# Patient Record
Sex: MALE | Race: WHITE | NOT HISPANIC OR LATINO | Employment: OTHER | URBAN - NONMETROPOLITAN AREA
[De-identification: names, ages, dates, MRNs, and addresses within clinical notes are randomized per-mention and may not be internally consistent; named-entity substitution may affect disease eponyms.]

---

## 2023-05-30 PROBLEM — I25.5 ISCHEMIC DILATED CARDIOMYOPATHY (MULTI): Status: ACTIVE | Noted: 2023-05-30

## 2023-05-30 PROBLEM — R73.09 ABNORMAL BLOOD SUGAR: Status: ACTIVE | Noted: 2023-05-30

## 2023-05-30 PROBLEM — I77.9: Status: ACTIVE | Noted: 2023-05-30

## 2023-05-30 PROBLEM — G47.30 SLEEP APNEA: Status: ACTIVE | Noted: 2023-05-30

## 2023-05-30 PROBLEM — E78.00 PURE HYPERCHOLESTEROLEMIA: Status: ACTIVE | Noted: 2023-05-30

## 2023-05-30 PROBLEM — R51.9 HEADACHE: Status: ACTIVE | Noted: 2023-05-30

## 2023-05-30 PROBLEM — R53.83 FATIGUE: Status: ACTIVE | Noted: 2023-05-30

## 2023-05-30 PROBLEM — I34.0 MODERATE MITRAL REGURGITATION: Status: ACTIVE | Noted: 2023-05-30

## 2023-05-30 PROBLEM — I25.10 CAD (CORONARY ARTERY DISEASE): Status: ACTIVE | Noted: 2023-05-30

## 2023-05-30 PROBLEM — Z98.890 H/O ABDOMINAL AORTIC ANEURYSM REPAIR: Status: ACTIVE | Noted: 2023-05-30

## 2023-05-30 PROBLEM — J34.2 NASAL SEPTAL DEVIATION: Status: ACTIVE | Noted: 2023-05-30

## 2023-05-30 PROBLEM — I50.9: Status: ACTIVE | Noted: 2023-05-30

## 2023-05-30 PROBLEM — I71.40 AAA (ABDOMINAL AORTIC ANEURYSM) (CMS-HCC): Status: ACTIVE | Noted: 2023-05-30

## 2023-05-30 PROBLEM — I10 HYPERTENSION, ESSENTIAL: Status: ACTIVE | Noted: 2023-05-30

## 2023-05-30 PROBLEM — G93.89 CEREBRAL VENTRICULOMEGALY: Status: ACTIVE | Noted: 2023-05-30

## 2023-05-30 PROBLEM — H71.92 CHOLESTEATOMA OF LEFT EAR: Status: ACTIVE | Noted: 2023-05-30

## 2023-05-30 PROBLEM — I42.0 ISCHEMIC DILATED CARDIOMYOPATHY (MULTI): Status: ACTIVE | Noted: 2023-05-30

## 2023-05-30 PROBLEM — R09.89 ABNORMAL PULSE: Status: ACTIVE | Noted: 2023-05-30

## 2023-05-30 PROBLEM — R26.9 GAIT DISTURBANCE: Status: ACTIVE | Noted: 2023-05-30

## 2023-05-30 PROBLEM — I44.7 LBBB (LEFT BUNDLE BRANCH BLOCK): Status: ACTIVE | Noted: 2023-05-30

## 2023-05-30 PROBLEM — H91.90 DECREASED HEARING: Status: ACTIVE | Noted: 2023-05-30

## 2023-05-30 PROBLEM — H65.92 FLUID LEVEL BEHIND TYMPANIC MEMBRANE OF LEFT EAR: Status: ACTIVE | Noted: 2023-05-30

## 2023-05-30 PROBLEM — R79.89 LOW VITAMIN D LEVEL: Status: ACTIVE | Noted: 2023-05-30

## 2023-05-30 PROBLEM — G47.00 INSOMNIA: Status: ACTIVE | Noted: 2023-05-30

## 2023-05-30 PROBLEM — F03.90 DEMENTIA (MULTI): Status: ACTIVE | Noted: 2023-05-30

## 2023-07-14 DIAGNOSIS — F03.90 UNSPECIFIED DEMENTIA, UNSPECIFIED SEVERITY, WITHOUT BEHAVIORAL DISTURBANCE, PSYCHOTIC DISTURBANCE, MOOD DISTURBANCE, AND ANXIETY (MULTI): ICD-10-CM

## 2023-07-14 RX ORDER — DONEPEZIL HYDROCHLORIDE 10 MG/1
TABLET, FILM COATED ORAL
Qty: 90 TABLET | Refills: 0 | Status: SHIPPED | OUTPATIENT
Start: 2023-07-14 | End: 2023-10-16

## 2023-07-18 ENCOUNTER — OFFICE VISIT (OUTPATIENT)
Dept: PRIMARY CARE | Facility: CLINIC | Age: 83
End: 2023-07-18
Payer: MEDICARE

## 2023-07-18 VITALS
SYSTOLIC BLOOD PRESSURE: 140 MMHG | BODY MASS INDEX: 28.16 KG/M2 | OXYGEN SATURATION: 93 % | DIASTOLIC BLOOD PRESSURE: 82 MMHG | TEMPERATURE: 97.4 F | WEIGHT: 185.2 LBS | HEART RATE: 85 BPM

## 2023-07-18 DIAGNOSIS — F03.C0 SEVERE DEMENTIA, UNSPECIFIED DEMENTIA TYPE, UNSPECIFIED WHETHER BEHAVIORAL, PSYCHOTIC, OR MOOD DISTURBANCE OR ANXIETY (MULTI): ICD-10-CM

## 2023-07-18 DIAGNOSIS — I25.83 CORONARY ARTERY DISEASE DUE TO LIPID RICH PLAQUE: ICD-10-CM

## 2023-07-18 DIAGNOSIS — I25.10 CORONARY ARTERY DISEASE DUE TO LIPID RICH PLAQUE: ICD-10-CM

## 2023-07-18 DIAGNOSIS — I10 HYPERTENSION, ESSENTIAL: Primary | ICD-10-CM

## 2023-07-18 DIAGNOSIS — Z98.890 H/O ABDOMINAL AORTIC ANEURYSM REPAIR: ICD-10-CM

## 2023-07-18 DIAGNOSIS — I50.9: ICD-10-CM

## 2023-07-18 PROBLEM — H65.92 FLUID LEVEL BEHIND TYMPANIC MEMBRANE OF LEFT EAR: Status: RESOLVED | Noted: 2023-05-30 | Resolved: 2023-07-18

## 2023-07-18 PROBLEM — I71.40 AAA (ABDOMINAL AORTIC ANEURYSM) (CMS-HCC): Status: RESOLVED | Noted: 2023-05-30 | Resolved: 2023-07-18

## 2023-07-18 PROBLEM — G47.00 INSOMNIA: Status: RESOLVED | Noted: 2023-05-30 | Resolved: 2023-07-18

## 2023-07-18 PROBLEM — R53.83 FATIGUE: Status: RESOLVED | Noted: 2023-05-30 | Resolved: 2023-07-18

## 2023-07-18 PROBLEM — R51.9 HEADACHE: Status: RESOLVED | Noted: 2023-05-30 | Resolved: 2023-07-18

## 2023-07-18 PROCEDURE — 3079F DIAST BP 80-89 MM HG: CPT | Performed by: INTERNAL MEDICINE

## 2023-07-18 PROCEDURE — 1126F AMNT PAIN NOTED NONE PRSNT: CPT | Performed by: INTERNAL MEDICINE

## 2023-07-18 PROCEDURE — 1036F TOBACCO NON-USER: CPT | Performed by: INTERNAL MEDICINE

## 2023-07-18 PROCEDURE — 1160F RVW MEDS BY RX/DR IN RCRD: CPT | Performed by: INTERNAL MEDICINE

## 2023-07-18 PROCEDURE — 1159F MED LIST DOCD IN RCRD: CPT | Performed by: INTERNAL MEDICINE

## 2023-07-18 PROCEDURE — 99214 OFFICE O/P EST MOD 30 MIN: CPT | Performed by: INTERNAL MEDICINE

## 2023-07-18 PROCEDURE — 3077F SYST BP >= 140 MM HG: CPT | Performed by: INTERNAL MEDICINE

## 2023-07-18 ASSESSMENT — ENCOUNTER SYMPTOMS
DIFFICULTY URINATING: 0
UNEXPECTED WEIGHT CHANGE: 0
BLOOD IN STOOL: 0
HEADACHES: 0
INSOMNIA: 1
ARTHRALGIAS: 0
BRUISES/BLEEDS EASILY: 0
SINUS PAIN: 0
DIARRHEA: 0
FATIGUE: 0
WHEEZING: 0
SORE THROAT: 0
FEVER: 0
DIZZINESS: 0
HYPERTENSION: 1
PALPITATIONS: 0
COUGH: 0
ABDOMINAL PAIN: 0

## 2023-07-18 NOTE — PROGRESS NOTES
"Subjective   Patient ID: Blu Grigsby is a 83 y.o. male who presents for abdominal aortic aneurysm, Coronary Artery Disease, Dementia, Hypertension, Insomnia, and Sleep Apnea.    - Patient comes today with his son doing well no complaints  - Cardiomyopathy compensated  - Underlying mitral regurgitation need to repeat echo in 1 year as a scheduled by cardiology denies any chest pain no leg swelling no shortness of breath  -Coronary artery disease compensated no chest pain or angina  -Underlying history of abdominal aorta repair compensated no recurrence  -\"Hypertension controlled  -\"I spent more 3 minutes counseling patient about need for smoking/tobacco cessation and how I can support efforts when patient is ready to quit. Discussed nicotine replacement therapy, Varenicline, Bupropion, hypnosis, support groups, and acupuncture as potential options.  Unable to quit underlying dementia.  -Advanced dementia on Aricept to 10 milligrams daily Namenda daily.  Follow-up 3 months      Coronary Artery Disease  Pertinent negatives include no chest pain, dizziness, leg swelling or palpitations.   Memory Loss      Hypertension  Pertinent negatives include no chest pain, headaches or palpitations.   Insomnia  Pertinent negatives include no abdominal pain, arthralgias, chest pain, congestion, coughing, fatigue, fever, headaches, rash or sore throat.          Review of Systems   Constitutional:  Negative for fatigue, fever and unexpected weight change.   HENT:  Negative for congestion, ear discharge, ear pain, mouth sores, sinus pain and sore throat.    Eyes:  Negative for visual disturbance.   Respiratory:  Negative for cough and wheezing.    Cardiovascular:  Negative for chest pain, palpitations and leg swelling.   Gastrointestinal:  Negative for abdominal pain, blood in stool and diarrhea.   Genitourinary:  Negative for difficulty urinating.   Musculoskeletal:  Negative for arthralgias.   Skin:  Negative for rash. " "  Neurological:  Negative for dizziness and headaches.   Hematological:  Does not bruise/bleed easily.   Psychiatric/Behavioral:  Negative for behavioral problems. The patient has insomnia.    All other systems reviewed and are negative.      Objective   No results found for: \"HGBA1C\"   /82   Pulse 85   Temp 36.3 °C (97.4 °F)   Wt 84 kg (185 lb 3.2 oz)   SpO2 93%   BMI 28.16 kg/m²   Lab Results   Component Value Date    WBC 7.0 01/27/2023    HGB 13.4 (L) 01/27/2023    HCT 43.0 01/27/2023     01/27/2023    CHOL 112 09/27/2022    TRIG 64 09/27/2022    HDL 42.0 09/27/2022    ALT 15 01/27/2023    AST 19 01/27/2023     01/27/2023    K 3.8 01/27/2023     01/27/2023    CREATININE 1.01 01/27/2023    BUN 17 01/27/2023    CO2 33 (H) 01/27/2023    TSH 2.40 09/27/2022    INR 1.2 (H) 01/27/2023     par   Physical Exam  Vitals and nursing note reviewed.   Constitutional:       Appearance: Normal appearance.   HENT:      Head: Normocephalic.      Nose: Nose normal.   Eyes:      Conjunctiva/sclera: Conjunctivae normal.      Pupils: Pupils are equal, round, and reactive to light.   Cardiovascular:      Rate and Rhythm: Regular rhythm.   Pulmonary:      Effort: Pulmonary effort is normal.      Breath sounds: Normal breath sounds.   Abdominal:      General: Abdomen is flat.      Palpations: Abdomen is soft.   Musculoskeletal:      Cervical back: Neck supple.   Skin:     General: Skin is warm.   Neurological:      General: No focal deficit present.      Mental Status: He is oriented to person, place, and time.      Sensory: No sensory deficit.      Motor: Weakness present.      Coordination: Coordination normal.      Gait: Gait abnormal.      Deep Tendon Reflexes: Reflexes abnormal.   Psychiatric:         Mood and Affect: Mood normal.         Assessment/Plan   Blu was seen today for abdominal aortic aneurysm, coronary artery disease, dementia, hypertension, insomnia and sleep apnea.  Diagnoses and all " "orders for this visit:  Hypertension, essential (Primary)  Stage C chronic combined congestive heart failure (CMS/HCC)  Coronary artery disease due to lipid rich plaque  H/O abdominal aortic aneurysm repair  Severe dementia, unspecified dementia type, unspecified whether behavioral, psychotic, or mood disturbance or anxiety (CMS/HCC)    Patient comes today with his son doing well no complaints  - Cardiomyopathy compensated  - Underlying mitral regurgitation need to repeat echo in 1 year as a scheduled by cardiology denies any chest pain no leg swelling no shortness of breath  -Coronary artery disease compensated no chest pain or angina  -Underlying history of abdominal aorta repair compensated no recurrence  -\"Hypertension controlled  -\"I spent more 3 minutes counseling patient about need for smoking/tobacco cessation and how I can support efforts when patient is ready to quit. Discussed nicotine replacement therapy, Varenicline, Bupropion, hypnosis, support groups, and acupuncture as potential options.  Unable to quit underlying dementia.  -Advanced dementia on Aricept to 10 milligrams daily Namenda daily.  Follow-up 3 months    "

## 2023-07-20 DIAGNOSIS — I10 ESSENTIAL (PRIMARY) HYPERTENSION: ICD-10-CM

## 2023-07-20 RX ORDER — LISINOPRIL 20 MG/1
20 TABLET ORAL 2 TIMES DAILY
Qty: 180 TABLET | Refills: 0 | Status: SHIPPED | OUTPATIENT
Start: 2023-07-20 | End: 2023-10-16

## 2023-07-27 DIAGNOSIS — I10 ESSENTIAL (PRIMARY) HYPERTENSION: ICD-10-CM

## 2023-07-27 DIAGNOSIS — F03.90 UNSPECIFIED DEMENTIA, UNSPECIFIED SEVERITY, WITHOUT BEHAVIORAL DISTURBANCE, PSYCHOTIC DISTURBANCE, MOOD DISTURBANCE, AND ANXIETY (MULTI): ICD-10-CM

## 2023-07-30 RX ORDER — MEMANTINE HYDROCHLORIDE 28 MG/1
28 CAPSULE, EXTENDED RELEASE ORAL DAILY
Qty: 90 CAPSULE | Refills: 1 | Status: SHIPPED | OUTPATIENT
Start: 2023-07-30 | End: 2024-02-06

## 2023-07-30 RX ORDER — FUROSEMIDE 40 MG/1
TABLET ORAL
Qty: 90 TABLET | Refills: 1 | Status: SHIPPED | OUTPATIENT
Start: 2023-07-30 | End: 2024-02-13 | Stop reason: SDUPTHER

## 2023-10-15 DIAGNOSIS — F03.90 UNSPECIFIED DEMENTIA, UNSPECIFIED SEVERITY, WITHOUT BEHAVIORAL DISTURBANCE, PSYCHOTIC DISTURBANCE, MOOD DISTURBANCE, AND ANXIETY (MULTI): ICD-10-CM

## 2023-10-16 DIAGNOSIS — I10 ESSENTIAL (PRIMARY) HYPERTENSION: ICD-10-CM

## 2023-10-16 RX ORDER — LISINOPRIL 20 MG/1
20 TABLET ORAL 2 TIMES DAILY
Qty: 180 TABLET | Refills: 0 | Status: SHIPPED | OUTPATIENT
Start: 2023-10-16 | End: 2024-01-15

## 2023-10-16 RX ORDER — DONEPEZIL HYDROCHLORIDE 10 MG/1
TABLET, FILM COATED ORAL
Qty: 90 TABLET | Refills: 0 | Status: SHIPPED | OUTPATIENT
Start: 2023-10-16 | End: 2024-01-14

## 2023-11-13 ENCOUNTER — OFFICE VISIT (OUTPATIENT)
Dept: PRIMARY CARE | Facility: CLINIC | Age: 83
End: 2023-11-13
Payer: MEDICARE

## 2023-11-13 VITALS
BODY MASS INDEX: 28.32 KG/M2 | SYSTOLIC BLOOD PRESSURE: 110 MMHG | DIASTOLIC BLOOD PRESSURE: 60 MMHG | HEIGHT: 66 IN | WEIGHT: 176.2 LBS | OXYGEN SATURATION: 94 % | TEMPERATURE: 97.1 F | HEART RATE: 60 BPM

## 2023-11-13 DIAGNOSIS — F03.C0 SEVERE DEMENTIA, UNSPECIFIED DEMENTIA TYPE, UNSPECIFIED WHETHER BEHAVIORAL, PSYCHOTIC, OR MOOD DISTURBANCE OR ANXIETY (MULTI): Primary | ICD-10-CM

## 2023-11-13 DIAGNOSIS — Z00.00 ROUTINE GENERAL MEDICAL EXAMINATION AT HEALTH CARE FACILITY: ICD-10-CM

## 2023-11-13 DIAGNOSIS — I50.9: ICD-10-CM

## 2023-11-13 DIAGNOSIS — I25.10 CORONARY ARTERY DISEASE DUE TO LIPID RICH PLAQUE: ICD-10-CM

## 2023-11-13 DIAGNOSIS — Z23 FLU VACCINE NEED: ICD-10-CM

## 2023-11-13 DIAGNOSIS — Z79.899 HIGH RISK MEDICATION USE: ICD-10-CM

## 2023-11-13 DIAGNOSIS — Z13.89 SCREENING FOR MULTIPLE CONDITIONS: ICD-10-CM

## 2023-11-13 DIAGNOSIS — I10 HYPERTENSION, UNSPECIFIED TYPE: ICD-10-CM

## 2023-11-13 DIAGNOSIS — I10 HYPERTENSION, ESSENTIAL: ICD-10-CM

## 2023-11-13 DIAGNOSIS — E53.8 VITAMIN B12 DEFICIENCY: ICD-10-CM

## 2023-11-13 DIAGNOSIS — I25.83 CORONARY ARTERY DISEASE DUE TO LIPID RICH PLAQUE: ICD-10-CM

## 2023-11-13 DIAGNOSIS — R53.83 OTHER FATIGUE: ICD-10-CM

## 2023-11-13 DIAGNOSIS — E78.00 HYPERCHOLESTEREMIA: ICD-10-CM

## 2023-11-13 DIAGNOSIS — E55.9 VITAMIN D DEFICIENCY, UNSPECIFIED: ICD-10-CM

## 2023-11-13 PROCEDURE — G0439 PPPS, SUBSEQ VISIT: HCPCS | Performed by: INTERNAL MEDICINE

## 2023-11-13 PROCEDURE — 1170F FXNL STATUS ASSESSED: CPT | Performed by: INTERNAL MEDICINE

## 2023-11-13 PROCEDURE — 99214 OFFICE O/P EST MOD 30 MIN: CPT | Performed by: INTERNAL MEDICINE

## 2023-11-13 PROCEDURE — 1159F MED LIST DOCD IN RCRD: CPT | Performed by: INTERNAL MEDICINE

## 2023-11-13 PROCEDURE — G0008 ADMIN INFLUENZA VIRUS VAC: HCPCS | Performed by: INTERNAL MEDICINE

## 2023-11-13 PROCEDURE — 1126F AMNT PAIN NOTED NONE PRSNT: CPT | Performed by: INTERNAL MEDICINE

## 2023-11-13 PROCEDURE — 90662 IIV NO PRSV INCREASED AG IM: CPT | Performed by: INTERNAL MEDICINE

## 2023-11-13 PROCEDURE — 1036F TOBACCO NON-USER: CPT | Performed by: INTERNAL MEDICINE

## 2023-11-13 PROCEDURE — 1160F RVW MEDS BY RX/DR IN RCRD: CPT | Performed by: INTERNAL MEDICINE

## 2023-11-13 PROCEDURE — 3078F DIAST BP <80 MM HG: CPT | Performed by: INTERNAL MEDICINE

## 2023-11-13 PROCEDURE — 3074F SYST BP LT 130 MM HG: CPT | Performed by: INTERNAL MEDICINE

## 2023-11-13 RX ORDER — CARVEDILOL 3.12 MG/1
1 TABLET ORAL 2 TIMES DAILY
COMMUNITY
Start: 2023-09-07

## 2023-11-13 ASSESSMENT — ENCOUNTER SYMPTOMS
ABDOMINAL PAIN: 0
OCCASIONAL FEELINGS OF UNSTEADINESS: 1
SINUS PAIN: 0
DIARRHEA: 0
WHEEZING: 0
BLOOD IN STOOL: 0
DEPRESSION: 0
COUGH: 0
SORE THROAT: 0
FEVER: 0
LOSS OF SENSATION IN FEET: 0
UNEXPECTED WEIGHT CHANGE: 0
BRUISES/BLEEDS EASILY: 0
DIFFICULTY URINATING: 0
HEADACHES: 0
DIZZINESS: 0
FATIGUE: 0
ARTHRALGIAS: 0
PALPITATIONS: 0

## 2023-11-13 ASSESSMENT — PATIENT HEALTH QUESTIONNAIRE - PHQ9
SUM OF ALL RESPONSES TO PHQ9 QUESTIONS 1 AND 2: 0
2. FEELING DOWN, DEPRESSED OR HOPELESS: NOT AT ALL
1. LITTLE INTEREST OR PLEASURE IN DOING THINGS: NOT AT ALL

## 2023-11-13 ASSESSMENT — ACTIVITIES OF DAILY LIVING (ADL)
BATHING: DEPENDENT
MANAGING_FINANCES: TOTAL CARE
TAKING_MEDICATION: TOTAL CARE
DRESSING: INDEPENDENT
DOING_HOUSEWORK: TOTAL CARE
GROCERY_SHOPPING: TOTAL CARE

## 2023-11-13 NOTE — PROGRESS NOTES
"Subjective   Reason for Visit: Blu Grigsby is an 83 y.o. male here for a Medicare Wellness visit.     Past Medical, Surgical, and Family History reviewed and updated in chart.    Reviewed all medications by prescribing practitioner or clinical pharmacist (such as prescriptions, OTCs, herbal therapies and supplements) and documented in the medical record.      Recent cardiac work-up reviewed compensated aortic valve disease and cardiomyopathy takes Lasix extra dose as needed  High-dose flu vaccine today  Annual Medicare physical  Vaccinations reviewed and up-to-date flu vaccine today  - Screening for colon cancer not needed because of patient age  - Screening for depression negative  Medical screening reviewed  - Needs blood work fasting    Follow-up  - Cardiomyopathy compensated continue Lasix as recommended  - Underlying mitral regurgitation need to repeat echo in 1 year as a scheduled by cardiology denies any chest pain no leg swelling no shortness of breath  -Coronary artery disease compensated no chest pain or angina  -Underlying history of abdominal aorta repair compensated no recurrence  -\"Hypertension controlled  Nicotine dependency  -\"I spent more 3 minutes counseling patient about need for smoking/tobacco cessation and how I can support efforts when patient is ready to quit. Discussed nicotine replacement therapy, Varenicline, Bupropion, hypnosis, support groups, and acupuncture as potential options.  Unable to quit underlying dementia.  -Advanced dementia on Aricept to 10 milligrams daily Namenda daily.  Follow-up 3 months        Patient Care Team:  Toya Dickey MD as PCP - General  [facility] Dickey [facility]     Review of Systems   Constitutional:  Negative for fatigue, fever and unexpected weight change.   HENT:  Negative for congestion, ear discharge, ear pain, mouth sores, sinus pain and sore throat.    Eyes:  Negative for visual disturbance.   Respiratory:  " "Negative for cough and wheezing.    Cardiovascular:  Negative for chest pain, palpitations and leg swelling.   Gastrointestinal:  Negative for abdominal pain, blood in stool and diarrhea.   Genitourinary:  Negative for difficulty urinating.   Musculoskeletal:  Negative for arthralgias.   Skin:  Negative for rash.   Neurological:  Negative for dizziness and headaches.   Hematological:  Does not bruise/bleed easily.   Psychiatric/Behavioral:  Negative for behavioral problems.    All other systems reviewed and are negative.      Objective   Vitals:  /60   Pulse 60   Temp 36.2 °C (97.1 °F)   Ht 1.676 m (5' 6\")   Wt 79.9 kg (176 lb 3.2 oz)   SpO2 94%   BMI 28.44 kg/m²     Lab Results   Component Value Date    WBC 7.0 01/27/2023    HGB 13.4 (L) 01/27/2023    HCT 43.0 01/27/2023     01/27/2023    CHOL 112 09/27/2022    TRIG 64 09/27/2022    HDL 42.0 09/27/2022    ALT 15 01/27/2023    AST 19 01/27/2023     01/27/2023    K 3.8 01/27/2023     01/27/2023    CREATININE 1.01 01/27/2023    BUN 17 01/27/2023    CO2 33 (H) 01/27/2023    TSH 2.40 09/27/2022    INR 1.2 (H) 01/27/2023     par   Physical Exam  Vitals and nursing note reviewed.   Constitutional:       Appearance: Normal appearance.   HENT:      Head: Normocephalic.      Nose: Nose normal.   Eyes:      Conjunctiva/sclera: Conjunctivae normal.      Pupils: Pupils are equal, round, and reactive to light.   Cardiovascular:      Rate and Rhythm: Regular rhythm.   Pulmonary:      Effort: Pulmonary effort is normal.      Breath sounds: Normal breath sounds.   Abdominal:      General: Abdomen is flat.      Palpations: Abdomen is soft.   Musculoskeletal:      Cervical back: Neck supple.   Skin:     General: Skin is warm.   Neurological:      General: No focal deficit present.      Mental Status: He is oriented to person, place, and time.   Psychiatric:         Mood and Affect: Mood normal.       Assessment/Plan   Problem List Items Addressed This " "Visit       CAD (coronary artery disease)    Relevant Medications    carvedilol (Coreg) 3.125 mg tablet    Dementia (CMS/HCC) - Primary    Hypertension, essential    Stage C chronic combined congestive heart failure (CMS/HCC)    Relevant Medications    carvedilol (Coreg) 3.125 mg tablet     Other Visit Diagnoses       Hypertension, unspecified type        Relevant Orders    Comprehensive Metabolic Panel    Flu vaccine need        Relevant Orders    Flu vaccine, quadrivalent, high-dose, preservative free, age 65y+ (FLUZONE) (Completed)    Screening for multiple conditions        Routine general medical examination at health care facility        High risk medication use        Relevant Orders    CBC and Auto Differential    Hypercholesteremia        Relevant Orders    Lipid Panel    Other fatigue        Relevant Orders    TSH with reflex to Free T4 if abnormal    Vitamin B12 deficiency        Relevant Orders    Vitamin B12    Vitamin D deficiency, unspecified        Relevant Orders    Vitamin D 25-Hydroxy,Total (for eval of Vitamin D levels)           Recent cardiac work-up reviewed compensated aortic valve disease and cardiomyopathy takes Lasix extra dose as needed  High-dose flu vaccine today  Annual Medicare physical  Vaccinations reviewed and up-to-date flu vaccine today  - Screening for colon cancer not needed because of patient age  - Screening for depression negative  Medical screening reviewed  - Needs blood work fasting    Follow-up  - Cardiomyopathy compensated continue Lasix as recommended  - Underlying mitral regurgitation need to repeat echo in 1 year as a scheduled by cardiology denies any chest pain no leg swelling no shortness of breath  -Coronary artery disease compensated no chest pain or angina  -Underlying history of abdominal aorta repair compensated no recurrence  -\"Hypertension controlled  Nicotine dependency  -\"I spent more 3 minutes counseling patient about need for smoking/tobacco cessation " and how I can support efforts when patient is ready to quit. Discussed nicotine replacement therapy, Varenicline, Bupropion, hypnosis, support groups, and acupuncture as potential options.  Unable to quit underlying dementia.  -Advanced dementia on Aricept to 10 milligrams daily Namenda daily.  Follow-up 3 months

## 2023-11-13 NOTE — PROGRESS NOTES
"Subjective   Patient ID: Blu Grigsby is a 83 y.o. male who presents for Medicare Annual Wellness Visit Subsequent and Flu Vaccine (Vaccine given).    HPI       Review of Systems    Objective   No results found for: \"HGBA1C\"   /60   Pulse 60   Temp 36.2 °C (97.1 °F)   Ht 1.676 m (5' 6\")   Wt 79.9 kg (176 lb 3.2 oz)   SpO2 94%   BMI 28.44 kg/m²     Physical Exam    Assessment/Plan   Blu was seen today for medicare annual wellness visit subsequent and flu vaccine.  Diagnoses and all orders for this visit:  Flu vaccine need  -     Flu vaccine, quadrivalent, high-dose, preservative free, age 65y+ (FLUZONE)     "

## 2024-01-12 DIAGNOSIS — F03.90 UNSPECIFIED DEMENTIA, UNSPECIFIED SEVERITY, WITHOUT BEHAVIORAL DISTURBANCE, PSYCHOTIC DISTURBANCE, MOOD DISTURBANCE, AND ANXIETY (MULTI): ICD-10-CM

## 2024-01-13 DIAGNOSIS — I10 ESSENTIAL (PRIMARY) HYPERTENSION: ICD-10-CM

## 2024-01-14 RX ORDER — DONEPEZIL HYDROCHLORIDE 10 MG/1
TABLET, FILM COATED ORAL
Qty: 90 TABLET | Refills: 0 | Status: SHIPPED | OUTPATIENT
Start: 2024-01-14 | End: 2024-02-13 | Stop reason: SDUPTHER

## 2024-01-15 RX ORDER — LISINOPRIL 20 MG/1
20 TABLET ORAL 2 TIMES DAILY
Qty: 180 TABLET | Refills: 1 | Status: SHIPPED | OUTPATIENT
Start: 2024-01-15

## 2024-02-05 DIAGNOSIS — F03.90 UNSPECIFIED DEMENTIA, UNSPECIFIED SEVERITY, WITHOUT BEHAVIORAL DISTURBANCE, PSYCHOTIC DISTURBANCE, MOOD DISTURBANCE, AND ANXIETY (MULTI): ICD-10-CM

## 2024-02-06 RX ORDER — MEMANTINE HYDROCHLORIDE 28 MG/1
28 CAPSULE, EXTENDED RELEASE ORAL DAILY
Qty: 90 CAPSULE | Refills: 1 | Status: SHIPPED | OUTPATIENT
Start: 2024-02-06 | End: 2024-03-05 | Stop reason: SINTOL

## 2024-02-12 ENCOUNTER — LAB (OUTPATIENT)
Dept: LAB | Facility: LAB | Age: 84
End: 2024-02-12
Payer: MEDICARE

## 2024-02-12 DIAGNOSIS — E78.00 HYPERCHOLESTEREMIA: ICD-10-CM

## 2024-02-12 DIAGNOSIS — I10 HYPERTENSION, UNSPECIFIED TYPE: ICD-10-CM

## 2024-02-12 DIAGNOSIS — R53.83 OTHER FATIGUE: ICD-10-CM

## 2024-02-12 DIAGNOSIS — E55.9 VITAMIN D DEFICIENCY, UNSPECIFIED: ICD-10-CM

## 2024-02-12 DIAGNOSIS — Z79.899 HIGH RISK MEDICATION USE: ICD-10-CM

## 2024-02-12 DIAGNOSIS — E53.8 VITAMIN B12 DEFICIENCY: ICD-10-CM

## 2024-02-12 LAB
25(OH)D3 SERPL-MCNC: 57 NG/ML (ref 30–100)
ALBUMIN SERPL BCP-MCNC: 4.2 G/DL (ref 3.4–5)
ALP SERPL-CCNC: 84 U/L (ref 33–136)
ALT SERPL W P-5'-P-CCNC: 25 U/L (ref 10–52)
ANION GAP SERPL CALC-SCNC: 7 MMOL/L (ref 10–20)
AST SERPL W P-5'-P-CCNC: 27 U/L (ref 9–39)
BASOPHILS # BLD AUTO: 0.05 X10*3/UL (ref 0–0.1)
BASOPHILS NFR BLD AUTO: 0.6 %
BILIRUB SERPL-MCNC: 1.3 MG/DL (ref 0–1.2)
BUN SERPL-MCNC: 18 MG/DL (ref 6–23)
CALCIUM SERPL-MCNC: 9.4 MG/DL (ref 8.6–10.3)
CHLORIDE SERPL-SCNC: 101 MMOL/L (ref 98–107)
CHOLEST SERPL-MCNC: 120 MG/DL (ref 0–199)
CHOLESTEROL/HDL RATIO: 2.8
CO2 SERPL-SCNC: 33 MMOL/L (ref 21–32)
CREAT SERPL-MCNC: 0.89 MG/DL (ref 0.5–1.3)
EGFRCR SERPLBLD CKD-EPI 2021: 85 ML/MIN/1.73M*2
EOSINOPHIL # BLD AUTO: 0.33 X10*3/UL (ref 0–0.4)
EOSINOPHIL NFR BLD AUTO: 3.8 %
ERYTHROCYTE [DISTWIDTH] IN BLOOD BY AUTOMATED COUNT: 15.9 % (ref 11.5–14.5)
GLUCOSE SERPL-MCNC: 97 MG/DL (ref 74–99)
HCT VFR BLD AUTO: 41.4 % (ref 41–52)
HDLC SERPL-MCNC: 42.8 MG/DL
HGB BLD-MCNC: 13.1 G/DL (ref 13.5–17.5)
IMM GRANULOCYTES # BLD AUTO: 0.04 X10*3/UL (ref 0–0.5)
IMM GRANULOCYTES NFR BLD AUTO: 0.5 % (ref 0–0.9)
LDLC SERPL CALC-MCNC: 64 MG/DL
LYMPHOCYTES # BLD AUTO: 1.03 X10*3/UL (ref 0.8–3)
LYMPHOCYTES NFR BLD AUTO: 11.7 %
MCH RBC QN AUTO: 29.3 PG (ref 26–34)
MCHC RBC AUTO-ENTMCNC: 31.6 G/DL (ref 32–36)
MCV RBC AUTO: 93 FL (ref 80–100)
MONOCYTES # BLD AUTO: 0.73 X10*3/UL (ref 0.05–0.8)
MONOCYTES NFR BLD AUTO: 8.3 %
NEUTROPHILS # BLD AUTO: 6.61 X10*3/UL (ref 1.6–5.5)
NEUTROPHILS NFR BLD AUTO: 75.1 %
NON HDL CHOLESTEROL: 77 MG/DL (ref 0–149)
NRBC BLD-RTO: 0 /100 WBCS (ref 0–0)
PLATELET # BLD AUTO: 231 X10*3/UL (ref 150–450)
POTASSIUM SERPL-SCNC: 4.2 MMOL/L (ref 3.5–5.3)
PROT SERPL-MCNC: 6.4 G/DL (ref 6.4–8.2)
RBC # BLD AUTO: 4.47 X10*6/UL (ref 4.5–5.9)
SODIUM SERPL-SCNC: 137 MMOL/L (ref 136–145)
TRIGL SERPL-MCNC: 65 MG/DL (ref 0–149)
TSH SERPL-ACNC: 2.33 MIU/L (ref 0.44–3.98)
VIT B12 SERPL-MCNC: 543 PG/ML (ref 211–911)
VLDL: 13 MG/DL (ref 0–40)
WBC # BLD AUTO: 8.8 X10*3/UL (ref 4.4–11.3)

## 2024-02-12 PROCEDURE — 82306 VITAMIN D 25 HYDROXY: CPT

## 2024-02-12 PROCEDURE — 82607 VITAMIN B-12: CPT

## 2024-02-12 PROCEDURE — 36415 COLL VENOUS BLD VENIPUNCTURE: CPT

## 2024-02-13 ENCOUNTER — OFFICE VISIT (OUTPATIENT)
Dept: PRIMARY CARE | Facility: CLINIC | Age: 84
End: 2024-02-13
Payer: MEDICARE

## 2024-02-13 ENCOUNTER — TELEPHONE (OUTPATIENT)
Dept: HOME HEALTH SERVICES | Facility: HOME HEALTH | Age: 84
End: 2024-02-13

## 2024-02-13 VITALS
BODY MASS INDEX: 27.37 KG/M2 | TEMPERATURE: 97.2 F | DIASTOLIC BLOOD PRESSURE: 64 MMHG | WEIGHT: 169.6 LBS | SYSTOLIC BLOOD PRESSURE: 110 MMHG | HEART RATE: 77 BPM | OXYGEN SATURATION: 93 %

## 2024-02-13 DIAGNOSIS — F99 INSOMNIA DUE TO OTHER MENTAL DISORDER: Primary | ICD-10-CM

## 2024-02-13 DIAGNOSIS — I50.9: ICD-10-CM

## 2024-02-13 DIAGNOSIS — I71.40 ABDOMINAL AORTIC ANEURYSM (AAA) WITHOUT RUPTURE, UNSPECIFIED PART (CMS-HCC): ICD-10-CM

## 2024-02-13 DIAGNOSIS — I10 ESSENTIAL (PRIMARY) HYPERTENSION: ICD-10-CM

## 2024-02-13 DIAGNOSIS — F03.C0 SEVERE DEMENTIA, UNSPECIFIED DEMENTIA TYPE, UNSPECIFIED WHETHER BEHAVIORAL, PSYCHOTIC, OR MOOD DISTURBANCE OR ANXIETY (MULTI): ICD-10-CM

## 2024-02-13 DIAGNOSIS — H35.3211 EXUDATIVE AGE-RELATED MACULAR DEGENERATION, RIGHT EYE, WITH ACTIVE CHOROIDAL NEOVASCULARIZATION (MULTI): ICD-10-CM

## 2024-02-13 DIAGNOSIS — F51.05 INSOMNIA DUE TO OTHER MENTAL DISORDER: Primary | ICD-10-CM

## 2024-02-13 DIAGNOSIS — F03.90 UNSPECIFIED DEMENTIA, UNSPECIFIED SEVERITY, WITHOUT BEHAVIORAL DISTURBANCE, PSYCHOTIC DISTURBANCE, MOOD DISTURBANCE, AND ANXIETY (MULTI): ICD-10-CM

## 2024-02-13 PROCEDURE — 99214 OFFICE O/P EST MOD 30 MIN: CPT | Performed by: INTERNAL MEDICINE

## 2024-02-13 PROCEDURE — 1036F TOBACCO NON-USER: CPT | Performed by: INTERNAL MEDICINE

## 2024-02-13 PROCEDURE — 1126F AMNT PAIN NOTED NONE PRSNT: CPT | Performed by: INTERNAL MEDICINE

## 2024-02-13 PROCEDURE — 1160F RVW MEDS BY RX/DR IN RCRD: CPT | Performed by: INTERNAL MEDICINE

## 2024-02-13 PROCEDURE — 3078F DIAST BP <80 MM HG: CPT | Performed by: INTERNAL MEDICINE

## 2024-02-13 PROCEDURE — 3074F SYST BP LT 130 MM HG: CPT | Performed by: INTERNAL MEDICINE

## 2024-02-13 PROCEDURE — 1159F MED LIST DOCD IN RCRD: CPT | Performed by: INTERNAL MEDICINE

## 2024-02-13 RX ORDER — DONEPEZIL HYDROCHLORIDE 10 MG/1
10 TABLET, FILM COATED ORAL EVERY EVENING
Qty: 90 TABLET | Refills: 1 | Status: SHIPPED | OUTPATIENT
Start: 2024-02-13

## 2024-02-13 RX ORDER — FUROSEMIDE 40 MG/1
40 TABLET ORAL DAILY
Qty: 90 TABLET | Refills: 1 | Status: SHIPPED | OUTPATIENT
Start: 2024-02-13

## 2024-02-13 RX ORDER — MIRTAZAPINE 7.5 MG/1
7.5 TABLET, FILM COATED ORAL NIGHTLY
Qty: 30 TABLET | Refills: 2 | Status: SHIPPED | OUTPATIENT
Start: 2024-02-13 | End: 2024-03-06

## 2024-02-13 ASSESSMENT — ENCOUNTER SYMPTOMS
UNEXPECTED WEIGHT CHANGE: 0
COUGH: 0
FATIGUE: 0
FEVER: 0
DIFFICULTY URINATING: 0
WHEEZING: 0
DIARRHEA: 0
DIZZINESS: 0
SORE THROAT: 0
BRUISES/BLEEDS EASILY: 0
ABDOMINAL PAIN: 0
ARTHRALGIAS: 0
HEADACHES: 0
PALPITATIONS: 0
SINUS PAIN: 0
BLOOD IN STOOL: 0

## 2024-02-13 NOTE — TELEPHONE ENCOUNTER
Thank you for the referral for Blu Grigsby. Please be advised that we do not currently have a HHA in this service area. We can process the referral for all other disciplines at this time. If a HHA is required, please send this referral to another agency.    Thank you,  Intake department

## 2024-02-13 NOTE — PROGRESS NOTES
"Subjective   Patient ID: Blu Grigsby is a 83 y.o. male who presents for Med Refill, Hypertension, Dementia (Requests home care referral ), Results, Insomnia (Waking up looking for wife who recently passed away), and Mass (On forehead on left side).     - Patient comes today with his son and daughter for further evaluation regarding his father condition deteriorating after his wife passed away recently patient total care at home  Insomnia fatigue and tiredness  Need to arrange with home care for physical therapy and further eval recommendation  Chronic insomnia add Remeron 7.5 mg every night follow-up with us in 4 weeks  - Cardiomyopathy compensated continue Lasix as recommended once a day  - Underlying mitral regurgitation need to repeat echo in 1 year as a scheduled by cardiology denies any chest pain no leg swelling no shortness of breath  -Coronary artery disease compensated no chest pain or angina  -Underlying history of abdominal aorta repair compensated no recurrence  -\"Hypertension controlled  Nicotine dependency  -\"I spent more 3 minutes counseling patient about need for smoking/tobacco cessation and how I can support efforts when patient is ready to quit. Discussed nicotine replacement therapy, Varenicline, Bupropion, hypnosis, support groups, and acupuncture as potential options.  Unable to quit underlying dementia.  -Advanced dementia on Aricept to 10 milligrams daily Namenda daily.  Follow-up 4 weeks                Review of Systems   Constitutional:  Negative for fatigue, fever and unexpected weight change.   HENT:  Negative for congestion, ear discharge, ear pain, mouth sores, sinus pain and sore throat.    Eyes:  Negative for visual disturbance.   Respiratory:  Negative for cough and wheezing.    Cardiovascular:  Negative for chest pain, palpitations and leg swelling.   Gastrointestinal:  Negative for abdominal pain, blood in stool and diarrhea.   Genitourinary:  Negative for difficulty urinating. " "  Musculoskeletal:  Negative for arthralgias.   Skin:  Negative for rash.   Neurological:  Negative for dizziness and headaches.   Hematological:  Does not bruise/bleed easily.   Psychiatric/Behavioral:  Negative for behavioral problems.    All other systems reviewed and are negative.      Objective   No results found for: \"HGBA1C\"   /64   Pulse 77   Temp 36.2 °C (97.2 °F)   Wt 76.9 kg (169 lb 9.6 oz)   SpO2 93%   BMI 27.37 kg/m²     Physical Exam  Vitals and nursing note reviewed.   Constitutional:       Appearance: Normal appearance.   HENT:      Head: Normocephalic.      Nose: Nose normal.   Eyes:      Conjunctiva/sclera: Conjunctivae normal.      Pupils: Pupils are equal, round, and reactive to light.   Cardiovascular:      Rate and Rhythm: Regular rhythm.   Pulmonary:      Effort: Pulmonary effort is normal.      Breath sounds: Normal breath sounds.   Abdominal:      General: Abdomen is flat.      Palpations: Abdomen is soft.   Musculoskeletal:      Cervical back: Neck supple.   Skin:     General: Skin is warm.   Neurological:      General: No focal deficit present.      Mental Status: He is oriented to person, place, and time.      Sensory: No sensory deficit.      Motor: Weakness present.      Coordination: Coordination normal.      Gait: Gait normal.      Deep Tendon Reflexes: Reflexes normal.      Comments: Patient pleasantly demented   Psychiatric:         Mood and Affect: Mood normal.         Assessment/Plan   Blu was seen today for med refill, hypertension, dementia, results, insomnia and mass.  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  -     Referral to Home Care; Future  Unspecified dementia, unspecified severity, without behavioral disturbance, psychotic disturbance, mood disturbance, and anxiety (CMS/HCC)  -     donepezil (Aricept) 10 mg " "tablet; Take 1 tablet (10 mg) by mouth once daily in the evening.  -     Referral to Home Care; Future  Essential (primary) hypertension  -     furosemide (Lasix) 40 mg tablet; Take 1 tablet (40 mg) by mouth once daily.  -     Referral to Home Care; Future  Exudative age-related macular degeneration, right eye, with active choroidal neovascularization (CMS/HCC)  Stage C chronic combined congestive heart failure (CMS/HCC)  Abdominal aortic aneurysm (AAA) without rupture, unspecified part (CMS/HCC)  Severe dementia, unspecified dementia type, unspecified whether behavioral, psychotic, or mood disturbance or anxiety (CMS/HCC)  Other orders  -     Follow Up In Primary Care - Established; Future    - Patient comes today with his son and daughter for further evaluation regarding his father condition deteriorating after his wife passed away recently patient total care at home  Insomnia fatigue and tiredness  Need to arrange with home care for physical therapy and further eval recommendation  Chronic insomnia add Remeron 7.5 mg every night follow-up with us in 4 weeks  - Cardiomyopathy compensated continue Lasix as recommended once a day  - Underlying mitral regurgitation need to repeat echo in 1 year as a scheduled by cardiology denies any chest pain no leg swelling no shortness of breath  -Coronary artery disease compensated no chest pain or angina  -Underlying history of abdominal aorta repair compensated no recurrence  -\"Hypertension controlled  Nicotine dependency  -\"I spent more 3 minutes counseling patient about need for smoking/tobacco cessation and how I can support efforts when patient is ready to quit. Discussed nicotine replacement therapy, Varenicline, Bupropion, hypnosis, support groups, and acupuncture as potential options.  Unable to quit underlying dementia.  -Advanced dementia on Aricept to 10 milligrams daily Namenda daily.  Follow-up 4 weeks       "

## 2024-02-14 ENCOUNTER — HOME HEALTH ADMISSION (OUTPATIENT)
Dept: HOME HEALTH SERVICES | Facility: HOME HEALTH | Age: 84
End: 2024-02-14
Payer: MEDICARE

## 2024-02-14 ENCOUNTER — DOCUMENTATION (OUTPATIENT)
Dept: HOME HEALTH SERVICES | Facility: HOME HEALTH | Age: 84
End: 2024-02-14
Payer: MEDICARE

## 2024-02-14 NOTE — HH CARE COORDINATION
Home Care received a Referral for Nursing and Physical Therapy. We have processed the referral for a Start of Care on 02/15-02/16.     If you have any questions or concerns, please feel free to contact us at [phone]. Follow the prompts, enter your five digit zip code, and you will be directed to your care team on [facility] 1.

## 2024-02-16 ENCOUNTER — HOME CARE VISIT (OUTPATIENT)
Dept: HOME HEALTH SERVICES | Facility: HOME HEALTH | Age: 84
End: 2024-02-16
Payer: MEDICARE

## 2024-02-16 VITALS
RESPIRATION RATE: 18 BRPM | DIASTOLIC BLOOD PRESSURE: 60 MMHG | HEART RATE: 66 BPM | BODY MASS INDEX: 27.25 KG/M2 | SYSTOLIC BLOOD PRESSURE: 100 MMHG | OXYGEN SATURATION: 90 % | TEMPERATURE: 98.4 F | HEIGHT: 66 IN | WEIGHT: 169.53 LBS

## 2024-02-16 PROCEDURE — 0023 HH SOC

## 2024-02-16 PROCEDURE — 1090000002 HH PPS REVENUE DEBIT

## 2024-02-16 PROCEDURE — G0299 HHS/HOSPICE OF RN EA 15 MIN: HCPCS | Mod: HHH

## 2024-02-16 PROCEDURE — 169592 NO-PAY CLAIM PROCEDURE

## 2024-02-16 PROCEDURE — 1090000001 HH PPS REVENUE CREDIT

## 2024-02-16 ASSESSMENT — ACTIVITIES OF DAILY LIVING (ADL)
OASIS_M1830: 03
DRESSING_UB_CURRENT_FUNCTION: CONTACT GUARD ASSIST
SHOPPING ASSESSED: 1
FEEDING ASSESSED: 1
BATHING_CURRENT_FUNCTION: MINIMUM ASSIST
ENTERING_EXITING_HOME: SUPERVISION
TRANSPORTATION: DEPENDENT
AMBULATION ASSISTANCE: 1
GROOMING ASSESSED: 1
ORAL_CARE_CURRENT_FUNCTION: NEEDS ASSISTANCE
FEEDING: SUPERVISION
USING THE TELPHONE: DEPENDENT
TOILETING: 1
HOUSEKEEPING ASSESSED: 1
GROOMING_CURRENT_FUNCTION: STAND BY ASSIST
TOILETING: SUPERVISION
PREPARING MEALS: DEPENDENT
SHOPPING: DEPENDENT
LAUNDRY ASSESSED: 1
AMBULATION ASSISTANCE: STAND BY ASSIST
PHYSICAL TRANSFERS ASSESSED: 1
TELEPHONE USE ASSESSED: 1
ORAL_CARE_ASSESSED: 1
LAUNDRY: DEPENDENT
BATHING ASSESSED: 1
CURRENT_FUNCTION: STAND BY ASSIST
TRANSPORTATION ASSESSED: 1
BATHING_CURRENT_FUNCTION: ONE PERSON
LIGHT HOUSEKEEPING: DEPENDENT
DRESSING_LB_CURRENT_FUNCTION: CONTACT GUARD ASSIST

## 2024-02-16 ASSESSMENT — ENCOUNTER SYMPTOMS
FORGETFULNESS: 1
DESCRIPTION OF MEMORY LOSS: SHORT TERM
OCCASIONAL FEELINGS OF UNSTEADINESS: 1
MUSCLE WEAKNESS: 1
FATIGUE: 1
APPETITE LEVEL: FAIR
DESCRIPTION OF MEMORY LOSS: LONG TERM
HYPOTENSION: 1
LIMITED RANGE OF MOTION: 1
DIZZINESS: 1
DRY SKIN: 1
DENIES PAIN: 1
PERSON REPORTING PAIN: PATIENT
HOARSE VOICE: 1
CHANGE IN APPETITE: DECREASED
DEPRESSED MOOD: 1
DESCRIPTION OF MEMORY LOSS: IMMEDIATE
FATIGUES EASILY: 1

## 2024-02-16 NOTE — Clinical Note
Hi I'm the  RN and could we consider:    The COST of the Namenda 24mg ER is $600 month while deductable not met. Could we consider switching over to the 10mg tabs? We might do best weening to the lower dose 14mg ER for two weeks  then the 10mg for better tolerability. The 10mg tabs are cheaper.$10.69.    The Lasix, can we try dropping to 40mg every other day and 20mg the alternating every other day?    I'm instructing on Ensure at least once a day.  Family is providing 24h care.    Thank you Vidya New RN MSN

## 2024-02-17 PROCEDURE — 1090000002 HH PPS REVENUE DEBIT

## 2024-02-17 PROCEDURE — 1090000001 HH PPS REVENUE CREDIT

## 2024-02-17 NOTE — HOME HEALTH
"Wife  24   Dtr Romana is DPOA and stays the night while dtr Elana stays during the day. Other family is starting to provide more support.  Pt has had more of a cognitive decline since his wife passed and his dementia presents advanced causing him to forget she passed away. Elana his dtr notes he sometimes thinks she is his wife.    Cost of Namenda 24mg ER is currently $600.   Called pharmacy   deductable  in November was $284.85.  Instructed to check on deductable. we all tlaked to them and his deductable and the ER being a higher tired med causing the high cost while 10mg Nemenda tabs are less than $11. Send message to the doctor about starting a ween,  Educted dtr on postive and negative effects of making this move. She was questioning the effectiveness of these meds after 10 years. Educated that there is research showing 18 months has the max effect but this nurse has seen great effects and the consequences when taken off abruptly which is why a ween being saught to make a change is required.Close monitoring can prevent this and a fast reaction to correct can help prevent the negative effects from being perminent. With thier 24h care and a ween over weeks would be the best way to assess and adjust quickly.    Caregiver steress is high and some inner fighting going on. Made dtr write down ( as she was writting down suggestions) \"GREAT JOB\" Offered support and reassurance. Also offered condolences.  Instructed on going out for simple less stressful interactions which she says they try. Also demonstrated positivity in interactions with affirmations which she notes they try to do but will make a greeater effort.  Stopped smoking.   HHA declined  Dtr has obtained cooperation once a week for a shower on Saturday. Skin in good condition.    During visit some of the time pt placed his head on the table and rested but always responded when called apon. Occassionally make jokes."

## 2024-02-18 PROCEDURE — 1090000002 HH PPS REVENUE DEBIT

## 2024-02-18 PROCEDURE — 1090000001 HH PPS REVENUE CREDIT

## 2024-02-19 ENCOUNTER — HOME CARE VISIT (OUTPATIENT)
Dept: HOME HEALTH SERVICES | Facility: HOME HEALTH | Age: 84
End: 2024-02-19
Payer: MEDICARE

## 2024-02-19 VITALS — HEART RATE: 54 BPM | DIASTOLIC BLOOD PRESSURE: 68 MMHG | SYSTOLIC BLOOD PRESSURE: 122 MMHG | RESPIRATION RATE: 18 BRPM

## 2024-02-19 DIAGNOSIS — F03.C0 SEVERE DEMENTIA, UNSPECIFIED DEMENTIA TYPE, UNSPECIFIED WHETHER BEHAVIORAL, PSYCHOTIC, OR MOOD DISTURBANCE OR ANXIETY (MULTI): ICD-10-CM

## 2024-02-19 DIAGNOSIS — I10 ESSENTIAL (PRIMARY) HYPERTENSION: ICD-10-CM

## 2024-02-19 PROCEDURE — G0151 HHCP-SERV OF PT,EA 15 MIN: HCPCS | Mod: HHH

## 2024-02-19 PROCEDURE — 1090000001 HH PPS REVENUE CREDIT

## 2024-02-19 PROCEDURE — 1090000002 HH PPS REVENUE DEBIT

## 2024-02-19 RX ORDER — MEMANTINE HYDROCHLORIDE 10 MG/1
10 TABLET ORAL 2 TIMES DAILY
COMMUNITY
End: 2024-02-19 | Stop reason: SDUPTHER

## 2024-02-19 RX ORDER — MEMANTINE HYDROCHLORIDE 14 MG/1
CAPSULE, EXTENDED RELEASE ORAL
Qty: 14 CAPSULE | Refills: 0 | Status: SHIPPED | OUTPATIENT
Start: 2024-02-19 | End: 2024-03-13

## 2024-02-19 RX ORDER — FUROSEMIDE 20 MG/1
20 TABLET ORAL EVERY OTHER DAY
COMMUNITY
End: 2024-02-19 | Stop reason: SDUPTHER

## 2024-02-19 RX ORDER — FUROSEMIDE 20 MG/1
20 TABLET ORAL EVERY OTHER DAY
Qty: 45 TABLET | Refills: 0 | Status: SHIPPED | OUTPATIENT
Start: 2024-02-19 | End: 2024-05-13

## 2024-02-19 RX ORDER — MEMANTINE HYDROCHLORIDE 10 MG/1
10 TABLET ORAL 2 TIMES DAILY
Qty: 180 TABLET | Refills: 0 | Status: SHIPPED | OUTPATIENT
Start: 2024-02-19 | End: 2024-05-13

## 2024-02-19 RX ORDER — MEMANTINE HYDROCHLORIDE 14 MG/1
14 CAPSULE, EXTENDED RELEASE ORAL DAILY
COMMUNITY
End: 2024-02-19 | Stop reason: SDUPTHER

## 2024-02-19 ASSESSMENT — ENCOUNTER SYMPTOMS
DENIES PAIN: 1
PERSON REPORTING PAIN: PATIENT

## 2024-02-19 ASSESSMENT — ACTIVITIES OF DAILY LIVING (ADL): AMBULATION ASSISTANCE ON FLAT SURFACES: 1

## 2024-02-20 ENCOUNTER — HOME CARE VISIT (OUTPATIENT)
Dept: HOME HEALTH SERVICES | Facility: HOME HEALTH | Age: 84
End: 2024-02-20
Payer: MEDICARE

## 2024-02-20 VITALS
TEMPERATURE: 98 F | DIASTOLIC BLOOD PRESSURE: 80 MMHG | SYSTOLIC BLOOD PRESSURE: 120 MMHG | OXYGEN SATURATION: 95 % | RESPIRATION RATE: 18 BRPM | HEART RATE: 60 BPM

## 2024-02-20 PROCEDURE — 1090000002 HH PPS REVENUE DEBIT

## 2024-02-20 PROCEDURE — 1090000001 HH PPS REVENUE CREDIT

## 2024-02-20 PROCEDURE — G0300 HHS/HOSPICE OF LPN EA 15 MIN: HCPCS | Mod: HHH

## 2024-02-20 ASSESSMENT — ENCOUNTER SYMPTOMS
LAST BOWEL MOVEMENT: 66890
APPETITE LEVEL: FAIR
MUSCLE WEAKNESS: 1
DENIES PAIN: 1

## 2024-02-20 NOTE — CASE COMMUNICATION
PT Eval completed. Patient is an 82 y/o male who was referred to HH by PCP following medicare wellness visit due to caregivers growing concern of patient requiring 24 hour supervision and family needing additional assistance. Upon evaluation patient presenting with significant unsteadiness with an increased risk of falling and further functional decline. Patient PMH includes HTN, HLD, Dementia, Depression. Patient's spouse recently pass ed away on 1.1.24. Patient's daughters are currently providing 24 hour care, however received assistance from hired help frpm 5-9 monday thru friday. Patient currently furniture walking and would highly benefit from ambulating with walker however due to limited carryover from dementia, gait training with walker may be limited. Patient demonstrates increased risk of falling with TUG score of 46 seconds. CGA with ambulation and supervisio n to SBA with all functional transfers. Patient will benefit from skilled PT services to improve strength, balance, transfer and ambulation ability and safety to reduce risk of falling and improve overall functional ability and safety. PT POC 2w4, patient and caregiver in agreement with PT POC.

## 2024-02-21 ENCOUNTER — HOME CARE VISIT (OUTPATIENT)
Dept: HOME HEALTH SERVICES | Facility: HOME HEALTH | Age: 84
End: 2024-02-21
Payer: MEDICARE

## 2024-02-21 PROCEDURE — 1090000001 HH PPS REVENUE CREDIT

## 2024-02-21 PROCEDURE — 1090000002 HH PPS REVENUE DEBIT

## 2024-02-21 PROCEDURE — G0299 HHS/HOSPICE OF RN EA 15 MIN: HCPCS | Mod: HHH

## 2024-02-22 ENCOUNTER — HOME CARE VISIT (OUTPATIENT)
Dept: HOME HEALTH SERVICES | Facility: HOME HEALTH | Age: 84
End: 2024-02-22
Payer: MEDICARE

## 2024-02-22 PROCEDURE — G0155 HHCP-SVS OF CSW,EA 15 MIN: HCPCS | Mod: HHH

## 2024-02-22 PROCEDURE — 1090000001 HH PPS REVENUE CREDIT

## 2024-02-22 PROCEDURE — 1090000002 HH PPS REVENUE DEBIT

## 2024-02-22 SDOH — SOCIAL STABILITY: SOCIAL NETWORK: HELP FROM FAMILY/FRIENDS: YES

## 2024-02-22 SDOH — HEALTH STABILITY: MENTAL HEALTH: STRESS FACTORS COMMENTS: ONGOING MEDICAL NEEDS AND PHYSICAL LIMITATIONS

## 2024-02-22 ASSESSMENT — ACTIVITIES OF DAILY LIVING (ADL)
AMBULATION_REQUIRES_ASSISTANCE: 1
LAUNDRY_REQUIRES_ASSISTANCE: 1
DRESSING_REQUIRES_ASSISTANCE: 1
BATHING_REQUIRES_ASSISTANCE: 1
SHOPPING_REQUIRES_ASSISTANCE: 1

## 2024-02-22 ASSESSMENT — ENCOUNTER SYMPTOMS: AGITATION: 1

## 2024-02-23 VITALS
OXYGEN SATURATION: 93 % | DIASTOLIC BLOOD PRESSURE: 76 MMHG | SYSTOLIC BLOOD PRESSURE: 134 MMHG | HEART RATE: 66 BPM | RESPIRATION RATE: 20 BRPM | TEMPERATURE: 98.7 F

## 2024-02-23 PROCEDURE — 1090000001 HH PPS REVENUE CREDIT

## 2024-02-23 PROCEDURE — 1090000002 HH PPS REVENUE DEBIT

## 2024-02-23 ASSESSMENT — ACTIVITIES OF DAILY LIVING (ADL)
LIGHT HOUSEKEEPING: DEPENDENT
SHOPPING: DEPENDENT
HOUSEKEEPING ASSESSED: 1
LAUNDRY: DEPENDENT
SHOPPING ASSESSED: 1
TRANSPORTATION: DEPENDENT
PREPARING MEALS: DEPENDENT
TELEPHONE USE ASSESSED: 1
TRANSPORTATION ASSESSED: 1
USING THE TELPHONE: DEPENDENT
LAUNDRY ASSESSED: 1

## 2024-02-23 ASSESSMENT — ENCOUNTER SYMPTOMS
DESCRIPTION OF MEMORY LOSS: SHORT TERM
CHANGE IN APPETITE: UNCHANGED
APPETITE LEVEL: GOOD
DRY SKIN: 1
FATIGUE: 1
HOARSE VOICE: 1
FATIGUES EASILY: 1
PERSON REPORTING PAIN: PATIENT
DESCRIPTION OF MEMORY LOSS: IMMEDIATE
FORGETFULNESS: 1
DENIES PAIN: 1
MUSCLE WEAKNESS: 1
DESCRIPTION OF MEMORY LOSS: LONG TERM

## 2024-02-23 NOTE — HOME HEALTH
Shandra notes her and her sister are arguing but both want the best for their father and caregiver burn out is the greatest concern. Provided support and simple instructions.  Romana stays at night and Elana most days. Elana states she agreed to stay one night but needs to be home more. She notes Romana is looking into others helping which would likely be cheaper than an agency. Elana states she wants a long tem plan that is sustainable. She mentioned Romana will be taking a vacation in the summer, eductaed that there are possible respite care options.   SLEEP & DIET: educated on progression of sleep & dietay patterns, outcomes, and mgmt   ( cat naps, power naps, smaller meals, protien  fluids)

## 2024-02-24 PROCEDURE — 1090000002 HH PPS REVENUE DEBIT

## 2024-02-24 PROCEDURE — 1090000001 HH PPS REVENUE CREDIT

## 2024-02-25 PROCEDURE — 1090000001 HH PPS REVENUE CREDIT

## 2024-02-25 PROCEDURE — 1090000002 HH PPS REVENUE DEBIT

## 2024-02-26 ENCOUNTER — HOME CARE VISIT (OUTPATIENT)
Dept: HOME HEALTH SERVICES | Facility: HOME HEALTH | Age: 84
End: 2024-02-26
Payer: MEDICARE

## 2024-02-26 VITALS
SYSTOLIC BLOOD PRESSURE: 108 MMHG | OXYGEN SATURATION: 98 % | DIASTOLIC BLOOD PRESSURE: 58 MMHG | TEMPERATURE: 98.6 F | HEART RATE: 59 BPM

## 2024-02-26 PROCEDURE — 1090000002 HH PPS REVENUE DEBIT

## 2024-02-26 PROCEDURE — G0157 HHC PT ASSISTANT EA 15: HCPCS | Mod: HHH

## 2024-02-26 PROCEDURE — G0180 MD CERTIFICATION HHA PATIENT: HCPCS | Performed by: INTERNAL MEDICINE

## 2024-02-26 PROCEDURE — 1090000001 HH PPS REVENUE CREDIT

## 2024-02-26 ASSESSMENT — ENCOUNTER SYMPTOMS: DENIES PAIN: 1

## 2024-02-27 ENCOUNTER — HOME CARE VISIT (OUTPATIENT)
Dept: HOME HEALTH SERVICES | Facility: HOME HEALTH | Age: 84
End: 2024-02-27
Payer: MEDICARE

## 2024-02-27 VITALS
RESPIRATION RATE: 18 BRPM | OXYGEN SATURATION: 94 % | DIASTOLIC BLOOD PRESSURE: 72 MMHG | SYSTOLIC BLOOD PRESSURE: 120 MMHG | HEART RATE: 62 BPM | TEMPERATURE: 97.4 F

## 2024-02-27 PROCEDURE — G0300 HHS/HOSPICE OF LPN EA 15 MIN: HCPCS | Mod: HHH

## 2024-02-27 PROCEDURE — 1090000001 HH PPS REVENUE CREDIT

## 2024-02-27 PROCEDURE — 1090000002 HH PPS REVENUE DEBIT

## 2024-02-27 ASSESSMENT — ENCOUNTER SYMPTOMS
APPETITE LEVEL: GOOD
MUSCLE WEAKNESS: 1
DENIES PAIN: 1
LAST BOWEL MOVEMENT: 66897

## 2024-02-28 PROCEDURE — 1090000002 HH PPS REVENUE DEBIT

## 2024-02-28 PROCEDURE — 1090000001 HH PPS REVENUE CREDIT

## 2024-02-29 PROCEDURE — 1090000001 HH PPS REVENUE CREDIT

## 2024-02-29 PROCEDURE — 1090000002 HH PPS REVENUE DEBIT

## 2024-03-01 ENCOUNTER — HOME CARE VISIT (OUTPATIENT)
Dept: HOME HEALTH SERVICES | Facility: HOME HEALTH | Age: 84
End: 2024-03-01
Payer: MEDICARE

## 2024-03-01 ENCOUNTER — APPOINTMENT (OUTPATIENT)
Dept: HOME HEALTH SERVICES | Facility: HOME HEALTH | Age: 84
End: 2024-03-01
Payer: MEDICARE

## 2024-03-01 VITALS
TEMPERATURE: 98.6 F | OXYGEN SATURATION: 99 % | DIASTOLIC BLOOD PRESSURE: 74 MMHG | SYSTOLIC BLOOD PRESSURE: 122 MMHG | HEART RATE: 59 BPM

## 2024-03-01 PROCEDURE — G0157 HHC PT ASSISTANT EA 15: HCPCS | Mod: HHH

## 2024-03-01 PROCEDURE — 1090000002 HH PPS REVENUE DEBIT

## 2024-03-01 PROCEDURE — 1090000001 HH PPS REVENUE CREDIT

## 2024-03-01 ASSESSMENT — ENCOUNTER SYMPTOMS: DENIES PAIN: 1

## 2024-03-02 PROCEDURE — 1090000001 HH PPS REVENUE CREDIT

## 2024-03-02 PROCEDURE — 1090000002 HH PPS REVENUE DEBIT

## 2024-03-03 PROCEDURE — 1090000002 HH PPS REVENUE DEBIT

## 2024-03-03 PROCEDURE — 1090000001 HH PPS REVENUE CREDIT

## 2024-03-04 PROCEDURE — 1090000002 HH PPS REVENUE DEBIT

## 2024-03-04 PROCEDURE — 1090000001 HH PPS REVENUE CREDIT

## 2024-03-05 ENCOUNTER — HOME CARE VISIT (OUTPATIENT)
Dept: HOME HEALTH SERVICES | Facility: HOME HEALTH | Age: 84
End: 2024-03-05
Payer: MEDICARE

## 2024-03-05 VITALS
TEMPERATURE: 97.8 F | SYSTOLIC BLOOD PRESSURE: 100 MMHG | OXYGEN SATURATION: 92 % | HEART RATE: 60 BPM | DIASTOLIC BLOOD PRESSURE: 50 MMHG | RESPIRATION RATE: 22 BRPM

## 2024-03-05 PROCEDURE — G0299 HHS/HOSPICE OF RN EA 15 MIN: HCPCS | Mod: HHH

## 2024-03-05 PROCEDURE — 1090000002 HH PPS REVENUE DEBIT

## 2024-03-05 PROCEDURE — 1090000001 HH PPS REVENUE CREDIT

## 2024-03-05 ASSESSMENT — PAIN SCALES - PAIN ASSESSMENT IN ADVANCED DEMENTIA (PAINAD)
NEGVOCALIZATION: 0
NEGVOCALIZATION: 0 - NONE.
BODYLANGUAGE: 0
FACIALEXPRESSION: 0 - SMILING OR INEXPRESSIVE.
FACIALEXPRESSION: 0
CONSOLABILITY: 0 - NO NEED TO CONSOLE.
BODYLANGUAGE: 0 - RELAXED.
BREATHING: 0
CONSOLABILITY: 0
TOTALSCORE: 0

## 2024-03-05 ASSESSMENT — ENCOUNTER SYMPTOMS
AGITATION: 1
LIMITED RANGE OF MOTION: 1
FATIGUES EASILY: 1
DRY SKIN: 1
FATIGUE: 1
DESCRIPTION OF MEMORY LOSS: SHORT TERM
PERSON REPORTING PAIN: PATIENT
DESCRIPTION OF MEMORY LOSS: IMMEDIATE
APPETITE LEVEL: FAIR
MUSCLE WEAKNESS: 1
CHANGE IN APPETITE: DECREASED
BLURRED VISION: 1
LAST BOWEL MOVEMENT: 66903
FORGETFULNESS: 1
DENIES PAIN: 1
DESCRIPTION OF MEMORY LOSS: LONG TERM
HYPOTENSION: 1

## 2024-03-05 ASSESSMENT — ACTIVITIES OF DAILY LIVING (ADL)
FEEDING: SUPERVISION
FEEDING ASSESSED: 1

## 2024-03-05 NOTE — HOME HEALTH
Dtr notes pt had new paid CG and she was proud she kept him up all day but then dtr notes he was hallucinating more and more irritable not believing his wife had . Instructed dtr he may not have to be reminded each time. Instructed  to instruct this person he can have two short naps as a possibility.  pt did start the transition from Namenda 21mg to 24mg  ER   going to 10mg twice a day not ER.  Educated on prevention of UTI  Educated on diet changes that can occur with advancing dementia and nutrician needs such as ways to add Ensure, or jello and soups.  Dtr notes they are considering a lower bed. Romana the other dtr did remove the shower glass doors.

## 2024-03-06 ENCOUNTER — HOME CARE VISIT (OUTPATIENT)
Dept: HOME HEALTH SERVICES | Facility: HOME HEALTH | Age: 84
End: 2024-03-06
Payer: MEDICARE

## 2024-03-06 VITALS
SYSTOLIC BLOOD PRESSURE: 142 MMHG | DIASTOLIC BLOOD PRESSURE: 80 MMHG | HEART RATE: 64 BPM | OXYGEN SATURATION: 97 % | TEMPERATURE: 99 F

## 2024-03-06 DIAGNOSIS — F99 INSOMNIA DUE TO OTHER MENTAL DISORDER: ICD-10-CM

## 2024-03-06 DIAGNOSIS — F51.05 INSOMNIA DUE TO OTHER MENTAL DISORDER: ICD-10-CM

## 2024-03-06 PROCEDURE — G0157 HHC PT ASSISTANT EA 15: HCPCS | Mod: HHH

## 2024-03-06 PROCEDURE — 1090000002 HH PPS REVENUE DEBIT

## 2024-03-06 PROCEDURE — 1090000001 HH PPS REVENUE CREDIT

## 2024-03-06 RX ORDER — MIRTAZAPINE 7.5 MG/1
7.5 TABLET, FILM COATED ORAL NIGHTLY
Qty: 90 TABLET | Refills: 1 | Status: SHIPPED | OUTPATIENT
Start: 2024-03-06 | End: 2024-03-13 | Stop reason: SDUPTHER

## 2024-03-06 ASSESSMENT — ENCOUNTER SYMPTOMS: DENIES PAIN: 1

## 2024-03-07 PROCEDURE — 1090000001 HH PPS REVENUE CREDIT

## 2024-03-07 PROCEDURE — 1090000002 HH PPS REVENUE DEBIT

## 2024-03-08 ENCOUNTER — HOME CARE VISIT (OUTPATIENT)
Dept: HOME HEALTH SERVICES | Facility: HOME HEALTH | Age: 84
End: 2024-03-08
Payer: MEDICARE

## 2024-03-08 VITALS — TEMPERATURE: 98.9 F | HEART RATE: 61 BPM | OXYGEN SATURATION: 96 %

## 2024-03-08 PROCEDURE — G0157 HHC PT ASSISTANT EA 15: HCPCS | Mod: HHH

## 2024-03-08 PROCEDURE — 1090000001 HH PPS REVENUE CREDIT

## 2024-03-08 PROCEDURE — 1090000002 HH PPS REVENUE DEBIT

## 2024-03-08 ASSESSMENT — ENCOUNTER SYMPTOMS: DENIES PAIN: 1

## 2024-03-09 PROCEDURE — 1090000001 HH PPS REVENUE CREDIT

## 2024-03-09 PROCEDURE — 1090000002 HH PPS REVENUE DEBIT

## 2024-03-10 PROCEDURE — 1090000002 HH PPS REVENUE DEBIT

## 2024-03-10 PROCEDURE — 1090000001 HH PPS REVENUE CREDIT

## 2024-03-11 ENCOUNTER — HOME CARE VISIT (OUTPATIENT)
Dept: HOME HEALTH SERVICES | Facility: HOME HEALTH | Age: 84
End: 2024-03-11
Payer: MEDICARE

## 2024-03-11 PROCEDURE — 1090000002 HH PPS REVENUE DEBIT

## 2024-03-11 PROCEDURE — 1090000001 HH PPS REVENUE CREDIT

## 2024-03-12 PROCEDURE — 1090000001 HH PPS REVENUE CREDIT

## 2024-03-12 PROCEDURE — 1090000002 HH PPS REVENUE DEBIT

## 2024-03-13 ENCOUNTER — OFFICE VISIT (OUTPATIENT)
Dept: PRIMARY CARE | Facility: CLINIC | Age: 84
End: 2024-03-13
Payer: MEDICARE

## 2024-03-13 ENCOUNTER — HOME CARE VISIT (OUTPATIENT)
Dept: HOME HEALTH SERVICES | Facility: HOME HEALTH | Age: 84
End: 2024-03-13
Payer: MEDICARE

## 2024-03-13 VITALS
TEMPERATURE: 98.4 F | SYSTOLIC BLOOD PRESSURE: 120 MMHG | HEART RATE: 64 BPM | RESPIRATION RATE: 18 BRPM | OXYGEN SATURATION: 95 % | DIASTOLIC BLOOD PRESSURE: 60 MMHG

## 2024-03-13 VITALS
WEIGHT: 170 LBS | TEMPERATURE: 97.5 F | OXYGEN SATURATION: 94 % | DIASTOLIC BLOOD PRESSURE: 60 MMHG | SYSTOLIC BLOOD PRESSURE: 110 MMHG | BODY MASS INDEX: 27.44 KG/M2 | HEART RATE: 62 BPM

## 2024-03-13 DIAGNOSIS — I10 HYPERTENSION, ESSENTIAL: ICD-10-CM

## 2024-03-13 DIAGNOSIS — F51.05 INSOMNIA DUE TO OTHER MENTAL DISORDER: ICD-10-CM

## 2024-03-13 DIAGNOSIS — I10 HYPERTENSION, UNSPECIFIED TYPE: ICD-10-CM

## 2024-03-13 DIAGNOSIS — E78.00 HYPERCHOLESTEREMIA: Primary | ICD-10-CM

## 2024-03-13 DIAGNOSIS — F99 INSOMNIA DUE TO OTHER MENTAL DISORDER: ICD-10-CM

## 2024-03-13 DIAGNOSIS — E55.9 VITAMIN D DEFICIENCY, UNSPECIFIED: ICD-10-CM

## 2024-03-13 PROCEDURE — 3074F SYST BP LT 130 MM HG: CPT | Performed by: INTERNAL MEDICINE

## 2024-03-13 PROCEDURE — 1160F RVW MEDS BY RX/DR IN RCRD: CPT | Performed by: INTERNAL MEDICINE

## 2024-03-13 PROCEDURE — 1090000001 HH PPS REVENUE CREDIT

## 2024-03-13 PROCEDURE — 99214 OFFICE O/P EST MOD 30 MIN: CPT | Performed by: INTERNAL MEDICINE

## 2024-03-13 PROCEDURE — G0299 HHS/HOSPICE OF RN EA 15 MIN: HCPCS | Mod: HHH

## 2024-03-13 PROCEDURE — 1090000002 HH PPS REVENUE DEBIT

## 2024-03-13 PROCEDURE — 1159F MED LIST DOCD IN RCRD: CPT | Performed by: INTERNAL MEDICINE

## 2024-03-13 PROCEDURE — 1036F TOBACCO NON-USER: CPT | Performed by: INTERNAL MEDICINE

## 2024-03-13 PROCEDURE — 3078F DIAST BP <80 MM HG: CPT | Performed by: INTERNAL MEDICINE

## 2024-03-13 RX ORDER — MIRTAZAPINE 15 MG/1
15 TABLET, FILM COATED ORAL NIGHTLY
Qty: 90 TABLET | Refills: 1 | Status: SHIPPED | OUTPATIENT
Start: 2024-03-13 | End: 2025-03-13

## 2024-03-13 ASSESSMENT — ENCOUNTER SYMPTOMS
WHEEZING: 0
MUSCLE WEAKNESS: 1
ABDOMINAL PAIN: 0
DIARRHEA: 0
SORE THROAT: 0
PERSON REPORTING PAIN: PATIENT
ARTHRALGIAS: 0
FORGETFULNESS: 1
PALPITATIONS: 0
HEADACHES: 0
DESCRIPTION OF MEMORY LOSS: LONG TERM
DRY SKIN: 1
LIMITED RANGE OF MOTION: 1
COUGH: 0
FEVER: 0
FATIGUE: 0
FATIGUE: 1
DIZZINESS: 0
DIFFICULTY URINATING: 0
BLOOD IN STOOL: 0
APPETITE LEVEL: GOOD
DESCRIPTION OF MEMORY LOSS: SHORT TERM
DESCRIPTION OF MEMORY LOSS: IMMEDIATE
DENIES PAIN: 1
INSOMNIA: 1
BRUISES/BLEEDS EASILY: 0
UNEXPECTED WEIGHT CHANGE: 0
FATIGUES EASILY: 1
SINUS PAIN: 0

## 2024-03-13 ASSESSMENT — ACTIVITIES OF DAILY LIVING (ADL)
BATHING ASSESSED: 1
ORAL_CARE_CURRENT_FUNCTION: NEEDS ASSISTANCE
DRESSING_UB_CURRENT_FUNCTION: SUPERVISION
ORAL_CARE_ASSESSED: 1
TOILETING: 1
BATHING_CURRENT_FUNCTION: ONE PERSON
TOILETING: SUPERVISION
DRESSING_LB_CURRENT_FUNCTION: CONTACT GUARD ASSIST

## 2024-03-13 NOTE — PROGRESS NOTES
"Subjective   Patient ID: Blu Grigsby is a 83 y.o. male who presents for Insomnia (Still getting up in the middle of the night at times, agitation and anxiety is better).     Patient comes today with his son and daughter for further evaluation regarding his father  - Anxiety and insomnia slowly improving need to increase Remeron to 15 mg for better anxiety and sleeping effect given new prescription  Need to change Aricept 10 mg to take it in the morning  -Advanced dementia continue with Aricept and Namenda multivitamins  - Cardiomyopathy compensated continue Lasix as recommended once a day  - Underlying mitral regurgitation need to repeat echo in 1 year as a scheduled by cardiology denies any chest pain no leg swelling no shortness of breath  -Coronary artery disease compensated no chest pain or angina  -Underlying history of abdominal aorta repair compensated no recurrence  -\"Hypertension controlled  Nicotine dependency  -\"I spent more 3 minutes counseling patient about need for smoking/tobacco cessation and how I can support efforts when patient is ready to quit. Discussed nicotine replacement therapy, Varenicline, Bupropion, hypnosis, support groups, and acupuncture as potential options.  Unable to quit underlying dementia.  -Follow-up 3 months    Insomnia  Pertinent negatives include no abdominal pain, arthralgias, chest pain, congestion, coughing, fatigue, fever, headaches, rash or sore throat.          Review of Systems   Constitutional:  Negative for fatigue, fever and unexpected weight change.   HENT:  Negative for congestion, ear discharge, ear pain, mouth sores, sinus pain and sore throat.    Eyes:  Negative for visual disturbance.   Respiratory:  Negative for cough and wheezing.    Cardiovascular:  Negative for chest pain, palpitations and leg swelling.   Gastrointestinal:  Negative for abdominal pain, blood in stool and diarrhea.   Genitourinary:  Negative for difficulty urinating.   Musculoskeletal:  " "Negative for arthralgias.   Skin:  Negative for rash.   Neurological:  Negative for dizziness and headaches.   Hematological:  Does not bruise/bleed easily.   Psychiatric/Behavioral:  Negative for behavioral problems. The patient has insomnia.    All other systems reviewed and are negative.      Objective   No results found for: \"HGBA1C\"   /60   Pulse 62   Temp 36.4 °C (97.5 °F)   Wt 77.1 kg (170 lb)   SpO2 94%   BMI 27.44 kg/m²   Lab Results   Component Value Date    WBC 8.8 02/12/2024    HGB 13.1 (L) 02/12/2024    HCT 41.4 02/12/2024     02/12/2024    CHOL 120 02/12/2024    TRIG 65 02/12/2024    HDL 42.8 02/12/2024    ALT 25 02/12/2024    AST 27 02/12/2024     02/12/2024    K 4.2 02/12/2024     02/12/2024    CREATININE 0.89 02/12/2024    BUN 18 02/12/2024    CO2 33 (H) 02/12/2024    TSH 2.33 02/12/2024    INR 1.2 (H) 01/27/2023     par   Physical Exam  Vitals and nursing note reviewed.   Constitutional:       Appearance: Normal appearance.   HENT:      Head: Normocephalic.      Nose: Nose normal.   Eyes:      Conjunctiva/sclera: Conjunctivae normal.      Pupils: Pupils are equal, round, and reactive to light.   Cardiovascular:      Rate and Rhythm: Regular rhythm.   Pulmonary:      Effort: Pulmonary effort is normal.      Breath sounds: Normal breath sounds.   Abdominal:      General: Abdomen is flat.      Palpations: Abdomen is soft.   Musculoskeletal:      Cervical back: Neck supple.   Skin:     General: Skin is warm.   Neurological:      General: No focal deficit present.      Mental Status: He is oriented to person, place, and time.   Psychiatric:         Mood and Affect: Mood normal.         Assessment/Plan   Blu was seen today for insomnia.  Diagnoses and all orders for this visit:  Hypercholesteremia (Primary)  Insomnia due to other mental disorder  -     mirtazapine (Remeron) 15 mg tablet; Take 1 tablet (15 mg) by mouth once daily at bedtime.  Hypertension, unspecified " "type  Vitamin D deficiency, unspecified  Hypertension, essential  Other orders  -     Follow Up In Primary Care - Established; Future   Patient comes today with his son and daughter for further evaluation regarding his father  - Anxiety and insomnia slowly improving need to increase Remeron to 15 mg for better anxiety and sleeping effect given new prescription  Need to change Aricept 10 mg to take it in the morning  -Advanced dementia continue with Aricept and Namenda multivitamins  - Cardiomyopathy compensated continue Lasix as recommended once a day  - Underlying mitral regurgitation need to repeat echo in 1 year as a scheduled by cardiology denies any chest pain no leg swelling no shortness of breath  -Coronary artery disease compensated no chest pain or angina  -Underlying history of abdominal aorta repair compensated no recurrence  -\"Hypertension controlled  Nicotine dependency  -\"I spent more 3 minutes counseling patient about need for smoking/tobacco cessation and how I can support efforts when patient is ready to quit. Discussed nicotine replacement therapy, Varenicline, Bupropion, hypnosis, support groups, and acupuncture as potential options.  Unable to quit underlying dementia.  -Follow-up 3 months  "

## 2024-03-13 NOTE — HOME HEALTH
Pt doing well but did sleep 12h last night. Adult children working out their times with some paid help.  Pt saw Dr. Dickey saw today with med changes as per dtr: Donepazil take in morning. Lasix 20mg daily and if ankles swollen give 10mg at night, and Remeron 15mg increase.  During visit pt had been incontinent and dtr had trouble getting him to change. Took pt in shower and had him change and void. Instructed dtr to give simple orders not asking if he wants becasue he can say no,  Pt more humorous today with his joking.  While pt in bathroom, educated on car safety with a cognitively challenged pt for safety concerns.

## 2024-03-14 ENCOUNTER — HOME CARE VISIT (OUTPATIENT)
Dept: HOME HEALTH SERVICES | Facility: HOME HEALTH | Age: 84
End: 2024-03-14
Payer: MEDICARE

## 2024-03-14 PROCEDURE — 1090000001 HH PPS REVENUE CREDIT

## 2024-03-14 PROCEDURE — G0151 HHCP-SERV OF PT,EA 15 MIN: HCPCS | Mod: HHH

## 2024-03-14 PROCEDURE — 1090000002 HH PPS REVENUE DEBIT

## 2024-03-14 ASSESSMENT — ENCOUNTER SYMPTOMS
PERSON REPORTING PAIN: PATIENT
DENIES PAIN: 1

## 2024-03-15 PROCEDURE — 1090000002 HH PPS REVENUE DEBIT

## 2024-03-15 PROCEDURE — 1090000001 HH PPS REVENUE CREDIT

## 2024-03-16 PROCEDURE — 1090000003 HH PPS REVENUE ADJ

## 2024-03-16 PROCEDURE — 1090000001 HH PPS REVENUE CREDIT

## 2024-03-16 PROCEDURE — 1090000002 HH PPS REVENUE DEBIT

## 2024-03-17 PROCEDURE — 1090000001 HH PPS REVENUE CREDIT

## 2024-03-17 PROCEDURE — 1090000002 HH PPS REVENUE DEBIT

## 2024-03-18 ENCOUNTER — HOME CARE VISIT (OUTPATIENT)
Dept: HOME HEALTH SERVICES | Facility: HOME HEALTH | Age: 84
End: 2024-03-18
Payer: MEDICARE

## 2024-03-18 VITALS
TEMPERATURE: 98.8 F | OXYGEN SATURATION: 91 % | RESPIRATION RATE: 20 BRPM | SYSTOLIC BLOOD PRESSURE: 162 MMHG | HEART RATE: 84 BPM | DIASTOLIC BLOOD PRESSURE: 82 MMHG

## 2024-03-18 PROCEDURE — 1090000002 HH PPS REVENUE DEBIT

## 2024-03-18 PROCEDURE — 0023 HH SOC

## 2024-03-18 PROCEDURE — G0299 HHS/HOSPICE OF RN EA 15 MIN: HCPCS | Mod: HHH

## 2024-03-18 PROCEDURE — 1090000001 HH PPS REVENUE CREDIT

## 2024-03-18 ASSESSMENT — ENCOUNTER SYMPTOMS
LAST BOWEL MOVEMENT: 66917
HYPERTENSION: 1
LIMITED RANGE OF MOTION: 1
DESCRIPTION OF MEMORY LOSS: SHORT TERM
CHANGE IN APPETITE: UNCHANGED
DENIES PAIN: 1
DESCRIPTION OF MEMORY LOSS: IMMEDIATE
FORGETFULNESS: 1
PERSON REPORTING PAIN: PATIENT
APPETITE LEVEL: GOOD
FATIGUE: 1
FATIGUES EASILY: 1
DESCRIPTION OF MEMORY LOSS: LONG TERM
MUSCLE WEAKNESS: 1
DRY SKIN: 1

## 2024-03-18 ASSESSMENT — ACTIVITIES OF DAILY LIVING (ADL)
FEEDING: SUPERVISION
PREPARING MEALS: DEPENDENT
SHOPPING ASSESSED: 1
FEEDING ASSESSED: 1
SHOPPING: DEPENDENT
BATHING_CURRENT_FUNCTION: MODERATE ASSIST
BATHING ASSESSED: 1

## 2024-03-18 NOTE — HOME HEALTH
Angina dtr thought but pt denies now    Ed on end of life mgmt  Ed on 911 DNRCCA and how to manage for each person including paid help and making them a written detailed instructions

## 2024-03-19 PROCEDURE — 1090000001 HH PPS REVENUE CREDIT

## 2024-03-19 PROCEDURE — 1090000002 HH PPS REVENUE DEBIT

## 2024-03-20 DIAGNOSIS — R39.9 URINARY SYMPTOM OR SIGN: ICD-10-CM

## 2024-03-20 PROCEDURE — 1090000002 HH PPS REVENUE DEBIT

## 2024-03-20 PROCEDURE — 1090000001 HH PPS REVENUE CREDIT

## 2024-03-21 PROCEDURE — 1090000001 HH PPS REVENUE CREDIT

## 2024-03-21 PROCEDURE — 1090000002 HH PPS REVENUE DEBIT

## 2024-03-22 PROCEDURE — 1090000002 HH PPS REVENUE DEBIT

## 2024-03-22 PROCEDURE — 1090000001 HH PPS REVENUE CREDIT

## 2024-03-23 PROCEDURE — 1090000002 HH PPS REVENUE DEBIT

## 2024-03-23 PROCEDURE — 1090000001 HH PPS REVENUE CREDIT

## 2024-03-24 PROCEDURE — 1090000002 HH PPS REVENUE DEBIT

## 2024-03-24 PROCEDURE — 1090000001 HH PPS REVENUE CREDIT

## 2024-03-25 ENCOUNTER — HOME CARE VISIT (OUTPATIENT)
Dept: HOME HEALTH SERVICES | Facility: HOME HEALTH | Age: 84
End: 2024-03-25
Payer: MEDICARE

## 2024-03-25 VITALS
DIASTOLIC BLOOD PRESSURE: 84 MMHG | TEMPERATURE: 98.4 F | SYSTOLIC BLOOD PRESSURE: 176 MMHG | OXYGEN SATURATION: 91 % | HEART RATE: 80 BPM | RESPIRATION RATE: 20 BRPM

## 2024-03-25 PROCEDURE — 1090000001 HH PPS REVENUE CREDIT

## 2024-03-25 PROCEDURE — 1090000002 HH PPS REVENUE DEBIT

## 2024-03-25 PROCEDURE — G0299 HHS/HOSPICE OF RN EA 15 MIN: HCPCS | Mod: HHH

## 2024-03-25 ASSESSMENT — ACTIVITIES OF DAILY LIVING (ADL)
GROOMING_CURRENT_FUNCTION: STAND BY ASSIST
OASIS_M1830: 03
FEEDING ASSESSED: 1
BATHING ASSESSED: 1
FEEDING: SUPERVISION
BATHING_CURRENT_FUNCTION: ONE PERSON
GROOMING ASSESSED: 1
TOILETING: SUPERVISION
HOME_HEALTH_OASIS: 01
TOILETING: 1

## 2024-03-25 ASSESSMENT — ENCOUNTER SYMPTOMS
PERSON REPORTING PAIN: PATIENT
DRY SKIN: 1
DENIES PAIN: 1
FATIGUES EASILY: 1
APPETITE LEVEL: GOOD
DESCRIPTION OF MEMORY LOSS: LONG TERM
FATIGUE: 1
DESCRIPTION OF MEMORY LOSS: IMMEDIATE
DESCRIPTION OF MEMORY LOSS: SHORT TERM
HYPERTENSION: 1
MUSCLE WEAKNESS: 1
CHANGE IN APPETITE: UNCHANGED
LIMITED RANGE OF MOTION: 1
FORGETFULNESS: 1

## 2024-03-25 NOTE — HOME HEALTH
Last week 3 episdoes of incontinence and now continent.  Pt thinking his sister, Anahy, visiting from [place] is his wife but accepted she is not.    Bed is possibly going to be replaced with a lower bed  On line support group Stephanie has joined  Sister Anahy given what to read she likes to read related ot his dementia.

## 2024-03-26 PROCEDURE — 1090000001 HH PPS REVENUE CREDIT

## 2024-03-26 PROCEDURE — 1090000002 HH PPS REVENUE DEBIT

## 2024-03-27 PROCEDURE — 1090000001 HH PPS REVENUE CREDIT

## 2024-03-27 PROCEDURE — 1090000002 HH PPS REVENUE DEBIT

## 2024-03-28 PROCEDURE — 1090000002 HH PPS REVENUE DEBIT

## 2024-03-28 PROCEDURE — 1090000001 HH PPS REVENUE CREDIT

## 2024-03-29 PROCEDURE — 1090000001 HH PPS REVENUE CREDIT

## 2024-03-29 PROCEDURE — 1090000002 HH PPS REVENUE DEBIT

## 2024-03-30 PROCEDURE — 1090000001 HH PPS REVENUE CREDIT

## 2024-03-30 PROCEDURE — 1090000002 HH PPS REVENUE DEBIT

## 2024-03-31 PROCEDURE — 1090000002 HH PPS REVENUE DEBIT

## 2024-03-31 PROCEDURE — 1090000001 HH PPS REVENUE CREDIT

## 2024-04-01 SDOH — ECONOMIC STABILITY: GENERAL

## 2024-04-01 ASSESSMENT — ACTIVITIES OF DAILY LIVING (ADL): MONEY MANAGEMENT (EXPENSES/BILLS): TOTALLY DEPENDENT

## 2024-04-04 ENCOUNTER — APPOINTMENT (OUTPATIENT)
Dept: PRIMARY CARE | Facility: CLINIC | Age: 84
End: 2024-04-04
Payer: MEDICARE

## 2024-04-06 ENCOUNTER — LAB (OUTPATIENT)
Dept: LAB | Facility: LAB | Age: 84
End: 2024-04-06
Payer: MEDICARE

## 2024-04-06 DIAGNOSIS — R39.9 URINARY SYMPTOM OR SIGN: ICD-10-CM

## 2024-04-06 LAB
APPEARANCE UR: CLEAR
BILIRUB UR STRIP.AUTO-MCNC: NEGATIVE MG/DL
COLOR UR: ABNORMAL
GLUCOSE UR STRIP.AUTO-MCNC: NEGATIVE MG/DL
HOLD SPECIMEN: NORMAL
KETONES UR STRIP.AUTO-MCNC: NEGATIVE MG/DL
LEUKOCYTE ESTERASE UR QL STRIP.AUTO: NEGATIVE
NITRITE UR QL STRIP.AUTO: NEGATIVE
PH UR STRIP.AUTO: 7 [PH]
PROT UR STRIP.AUTO-MCNC: NEGATIVE MG/DL
RBC # UR STRIP.AUTO: ABNORMAL /UL
RBC #/AREA URNS AUTO: NORMAL /HPF
SP GR UR STRIP.AUTO: 1.01
UROBILINOGEN UR STRIP.AUTO-MCNC: <2 MG/DL
WBC #/AREA URNS AUTO: NORMAL /HPF

## 2024-04-07 DIAGNOSIS — E78.00 PURE HYPERCHOLESTEROLEMIA, UNSPECIFIED: ICD-10-CM

## 2024-04-08 RX ORDER — ATORVASTATIN CALCIUM 80 MG/1
80 TABLET, FILM COATED ORAL DAILY
Qty: 30 TABLET | Refills: 1 | Status: SHIPPED | OUTPATIENT
Start: 2024-04-08 | End: 2024-05-02

## 2024-04-09 ENCOUNTER — TELEPHONE (OUTPATIENT)
Dept: PRIMARY CARE | Facility: CLINIC | Age: 84
End: 2024-04-09
Payer: MEDICARE

## 2024-04-09 DIAGNOSIS — F03.C0 SEVERE DEMENTIA, UNSPECIFIED DEMENTIA TYPE, UNSPECIFIED WHETHER BEHAVIORAL, PSYCHOTIC, OR MOOD DISTURBANCE OR ANXIETY (MULTI): Primary | ICD-10-CM

## 2024-04-10 NOTE — PROGRESS NOTES
Advanced dementia hallucination,  Discussed with family to refer to neurology for further eval and recommendation

## 2024-05-02 DIAGNOSIS — E78.00 PURE HYPERCHOLESTEROLEMIA, UNSPECIFIED: ICD-10-CM

## 2024-05-02 RX ORDER — ATORVASTATIN CALCIUM 80 MG/1
80 TABLET, FILM COATED ORAL DAILY
Qty: 90 TABLET | Refills: 1 | Status: SHIPPED | OUTPATIENT
Start: 2024-05-02

## 2024-05-10 DIAGNOSIS — I10 ESSENTIAL (PRIMARY) HYPERTENSION: ICD-10-CM

## 2024-05-10 DIAGNOSIS — F03.C0 SEVERE DEMENTIA, UNSPECIFIED DEMENTIA TYPE, UNSPECIFIED WHETHER BEHAVIORAL, PSYCHOTIC, OR MOOD DISTURBANCE OR ANXIETY (MULTI): ICD-10-CM

## 2024-05-13 RX ORDER — MEMANTINE HYDROCHLORIDE 10 MG/1
10 TABLET ORAL 2 TIMES DAILY
Qty: 180 TABLET | Refills: 1 | Status: SHIPPED | OUTPATIENT
Start: 2024-05-13

## 2024-05-13 RX ORDER — FUROSEMIDE 20 MG/1
20 TABLET ORAL EVERY OTHER DAY
Qty: 45 TABLET | Refills: 0 | Status: SHIPPED | OUTPATIENT
Start: 2024-05-13

## 2024-06-11 DIAGNOSIS — F03.C0 SEVERE DEMENTIA, UNSPECIFIED DEMENTIA TYPE, UNSPECIFIED WHETHER BEHAVIORAL, PSYCHOTIC, OR MOOD DISTURBANCE OR ANXIETY (MULTI): ICD-10-CM

## 2024-06-11 RX ORDER — DIVALPROEX SODIUM 125 MG/1
125 TABLET, DELAYED RELEASE ORAL NIGHTLY
COMMUNITY
End: 2024-06-11 | Stop reason: SDUPTHER

## 2024-06-11 RX ORDER — DIVALPROEX SODIUM 250 MG/1
250 TABLET, FILM COATED, EXTENDED RELEASE ORAL DAILY
COMMUNITY
End: 2024-06-11 | Stop reason: DRUGHIGH

## 2024-06-11 RX ORDER — DIVALPROEX SODIUM 125 MG/1
125 TABLET, DELAYED RELEASE ORAL NIGHTLY
Qty: 30 TABLET | Refills: 2 | Status: SHIPPED | OUTPATIENT
Start: 2024-06-11

## 2024-06-18 ENCOUNTER — HOME HEALTH ADMISSION (OUTPATIENT)
Dept: HOME HEALTH SERVICES | Facility: HOME HEALTH | Age: 84
End: 2024-06-18
Payer: MEDICARE

## 2024-06-18 ENCOUNTER — APPOINTMENT (OUTPATIENT)
Dept: PRIMARY CARE | Facility: CLINIC | Age: 84
End: 2024-06-18
Payer: MEDICARE

## 2024-06-18 ENCOUNTER — TELEPHONE (OUTPATIENT)
Dept: HOME HEALTH SERVICES | Facility: HOME HEALTH | Age: 84
End: 2024-06-18

## 2024-06-18 ENCOUNTER — LAB (OUTPATIENT)
Dept: LAB | Facility: LAB | Age: 84
End: 2024-06-18
Payer: MEDICARE

## 2024-06-18 ENCOUNTER — DOCUMENTATION (OUTPATIENT)
Dept: HOME HEALTH SERVICES | Facility: HOME HEALTH | Age: 84
End: 2024-06-18

## 2024-06-18 VITALS
BODY MASS INDEX: 30.26 KG/M2 | OXYGEN SATURATION: 90 % | WEIGHT: 187.5 LBS | SYSTOLIC BLOOD PRESSURE: 92 MMHG | HEART RATE: 63 BPM | DIASTOLIC BLOOD PRESSURE: 56 MMHG | TEMPERATURE: 96.9 F

## 2024-06-18 DIAGNOSIS — F03.C0 SEVERE DEMENTIA, UNSPECIFIED DEMENTIA TYPE, UNSPECIFIED WHETHER BEHAVIORAL, PSYCHOTIC, OR MOOD DISTURBANCE OR ANXIETY (MULTI): ICD-10-CM

## 2024-06-18 DIAGNOSIS — E78.00 HYPERCHOLESTEREMIA: ICD-10-CM

## 2024-06-18 DIAGNOSIS — I10 HYPERTENSION, UNSPECIFIED TYPE: ICD-10-CM

## 2024-06-18 DIAGNOSIS — E78.00 PURE HYPERCHOLESTEROLEMIA, UNSPECIFIED: ICD-10-CM

## 2024-06-18 DIAGNOSIS — I50.9 CONGESTIVE HEART FAILURE, UNSPECIFIED HF CHRONICITY, UNSPECIFIED HEART FAILURE TYPE (MULTI): ICD-10-CM

## 2024-06-18 DIAGNOSIS — H35.3211 EXUDATIVE AGE-RELATED MACULAR DEGENERATION, RIGHT EYE, WITH ACTIVE CHOROIDAL NEOVASCULARIZATION (MULTI): ICD-10-CM

## 2024-06-18 DIAGNOSIS — I10 HYPERTENSION, ESSENTIAL: Primary | ICD-10-CM

## 2024-06-18 DIAGNOSIS — E55.9 VITAMIN D DEFICIENCY, UNSPECIFIED: ICD-10-CM

## 2024-06-18 DIAGNOSIS — I10 HYPERTENSION, ESSENTIAL: ICD-10-CM

## 2024-06-18 DIAGNOSIS — I10 ESSENTIAL (PRIMARY) HYPERTENSION: ICD-10-CM

## 2024-06-18 LAB
ANION GAP SERPL CALC-SCNC: 9 MMOL/L (ref 10–20)
BUN SERPL-MCNC: 21 MG/DL (ref 6–23)
CALCIUM SERPL-MCNC: 9.1 MG/DL (ref 8.6–10.3)
CHLORIDE SERPL-SCNC: 102 MMOL/L (ref 98–107)
CO2 SERPL-SCNC: 33 MMOL/L (ref 21–32)
CREAT SERPL-MCNC: 1.12 MG/DL (ref 0.5–1.3)
EGFRCR SERPLBLD CKD-EPI 2021: 65 ML/MIN/1.73M*2
GLUCOSE SERPL-MCNC: 149 MG/DL (ref 74–99)
POTASSIUM SERPL-SCNC: 3.8 MMOL/L (ref 3.5–5.3)
SODIUM SERPL-SCNC: 140 MMOL/L (ref 136–145)

## 2024-06-18 PROCEDURE — 3078F DIAST BP <80 MM HG: CPT | Performed by: INTERNAL MEDICINE

## 2024-06-18 PROCEDURE — 1160F RVW MEDS BY RX/DR IN RCRD: CPT | Performed by: INTERNAL MEDICINE

## 2024-06-18 PROCEDURE — 1159F MED LIST DOCD IN RCRD: CPT | Performed by: INTERNAL MEDICINE

## 2024-06-18 PROCEDURE — 1157F ADVNC CARE PLAN IN RCRD: CPT | Performed by: INTERNAL MEDICINE

## 2024-06-18 PROCEDURE — 1036F TOBACCO NON-USER: CPT | Performed by: INTERNAL MEDICINE

## 2024-06-18 PROCEDURE — 3074F SYST BP LT 130 MM HG: CPT | Performed by: INTERNAL MEDICINE

## 2024-06-18 PROCEDURE — 36415 COLL VENOUS BLD VENIPUNCTURE: CPT

## 2024-06-18 PROCEDURE — 99214 OFFICE O/P EST MOD 30 MIN: CPT | Performed by: INTERNAL MEDICINE

## 2024-06-18 RX ORDER — LISINOPRIL 20 MG/1
20 TABLET ORAL DAILY
Qty: 180 TABLET | Refills: 1 | Status: SHIPPED | OUTPATIENT
Start: 2024-06-18

## 2024-06-18 RX ORDER — FUROSEMIDE 40 MG/1
40 TABLET ORAL
Qty: 90 TABLET | Refills: 1 | Status: SHIPPED | OUTPATIENT
Start: 2024-06-18

## 2024-06-18 ASSESSMENT — ENCOUNTER SYMPTOMS
UNEXPECTED WEIGHT CHANGE: 0
DIARRHEA: 0
FEVER: 0
SINUS PAIN: 0
PALPITATIONS: 0
HEADACHES: 0
CONFUSION: 1
SORE THROAT: 0
COUGH: 0
DIZZINESS: 0
DIFFICULTY URINATING: 0
WHEEZING: 0
BRUISES/BLEEDS EASILY: 0
ARTHRALGIAS: 0
HYPERTENSION: 1
FATIGUE: 0
ABDOMINAL PAIN: 0
BLOOD IN STOOL: 0

## 2024-06-18 NOTE — HH CARE COORDINATION
Home Care received a Referral for Nursing. We have processed the referral for a Start of Care on 6/19-6/20.     If you have any questions or concerns, please feel free to contact us at [phone]. Follow the prompts, enter your five digit zip code, and you will be directed to your care team on [facility] 2.

## 2024-06-18 NOTE — PROGRESS NOTES
Patient ID: Blu Grigsby is a 84 y.o. male who presents for Joint Swelling (Has increased lasix to 40mg bid ), Hypertension (Bp has been 140/90 in the evenings), questions regarding depokote, and questions regarding staying on cholesterol.    Patient family counseled today and the caregiver coming with multiple questions answered and reviewed with patient  -Bilateral ankle edema now patient on Lasix 40 mg twice a day failed to taper down dose continue 40 mg twice a day follow-up electrolytes today  - Borderline hypotension patient need to cut down lisinopril to take only 20 mg daily continue with carvedilol twice a day continue low-salt diet  Arrange with home health visiting nurse for evaluation and monitoring order placed today  -Hypercholesterolemia due to patient advanced dementia no need for atorvastatin at this time continue with diet control only  -Anxiety and depression doing very well with Remeron 15 mg Depakote 125 mg at bedtime patient responded very well his anxiety is more cooperative  -Advanced dementia continue with Aricept and Namenda multivitamins  - Cardiomyopathy compensated continue Lasix as recommended once a day  - Underlying mitral regurgitation need to repeat echo in 1 year as a scheduled by cardiology denies any chest pain no leg swelling no shortness of breath  -Coronary artery disease compensated no chest pain or angina  -Underlying history of abdominal aorta repair compensated no recurrence  -Nicotine dependency unable to quit smoking underlying dementia  -Follow-up 3 months         Hypertension  Pertinent negatives include no chest pain, headaches or palpitations.        Review of Systems   Constitutional:  Negative for fatigue, fever and unexpected weight change.   HENT:  Negative for congestion, ear discharge, ear pain, mouth sores, sinus pain and sore throat.    Eyes:  Negative for visual disturbance.   Respiratory:  Negative for cough and wheezing.    Cardiovascular:  Positive  for leg swelling. Negative for chest pain and palpitations.   Gastrointestinal:  Negative for abdominal pain, blood in stool and diarrhea.   Genitourinary:  Negative for difficulty urinating.   Musculoskeletal:  Negative for arthralgias.   Skin:  Negative for rash.   Neurological:  Negative for dizziness and headaches.   Hematological:  Does not bruise/bleed easily.   Psychiatric/Behavioral:  Positive for confusion. Negative for behavioral problems.    All other systems reviewed and are negative.      Objective   BP 92/56   Pulse 63   Temp 36.1 °C (96.9 °F)   Wt 85 kg (187 lb 8 oz)   SpO2 90%   BMI 30.26 kg/m²     Physical Exam  Vitals and nursing note reviewed.   Constitutional:       Appearance: Normal appearance.   HENT:      Head: Normocephalic.      Nose: Nose normal.   Eyes:      Conjunctiva/sclera: Conjunctivae normal.      Pupils: Pupils are equal, round, and reactive to light.   Cardiovascular:      Rate and Rhythm: Regular rhythm.   Pulmonary:      Effort: Pulmonary effort is normal.      Breath sounds: Normal breath sounds.   Abdominal:      General: Abdomen is flat.      Palpations: Abdomen is soft.   Musculoskeletal:      Cervical back: Neck supple.      Right lower leg: Edema present.      Left lower leg: Edema present.   Skin:     General: Skin is warm.   Neurological:      General: No focal deficit present.      Mental Status: He is oriented to person, place, and time.   Psychiatric:         Mood and Affect: Mood normal.         Assessment/Plan   Problem List Items Addressed This Visit       Severe dementia, unspecified dementia type, unspecified whether behavioral, psychotic, or mood disturbance or anxiety (Multi)    Relevant Orders    Referral to Home Care    Hypertension, essential - Primary    Relevant Orders    Basic metabolic panel    Exudative age-related macular degeneration, right eye, with active choroidal neovascularization (Multi)     Other Visit Diagnoses       Vitamin D deficiency,  unspecified        Pure hypercholesterolemia, unspecified        Essential (primary) hypertension        Relevant Medications    furosemide (Lasix) 40 mg tablet    lisinopril 20 mg tablet    Hypercholesteremia        Congestive heart failure, unspecified HF chronicity, unspecified heart failure type (Multi)        Hypertension, unspecified type            Patient family counseled today and the caregiver coming with multiple questions answered and reviewed with patient  -Bilateral ankle edema now patient on Lasix 40 mg twice a day failed to taper down dose continue 40 mg twice a day follow-up electrolytes today  - Borderline hypotension patient need to cut down lisinopril to take only 20 mg daily continue with carvedilol twice a day continue low-salt diet  Arrange with home health visiting nurse for evaluation and monitoring order placed today  -Hypercholesterolemia due to patient advanced dementia no need for atorvastatin at this time continue with diet control only  -Anxiety and depression doing very well with Remeron 15 mg Depakote 125 mg at bedtime patient responded very well his anxiety is more cooperative  -Advanced dementia continue with Aricept and Namenda multivitamins  - Cardiomyopathy compensated continue Lasix as recommended once a day  - Underlying mitral regurgitation need to repeat echo in 1 year as a scheduled by cardiology denies any chest pain no leg swelling no shortness of breath  -Coronary artery disease compensated no chest pain or angina  -Underlying history of abdominal aorta repair compensated no recurrence  -Nicotine dependency unable to quit smoking underlying dementia  -Follow-up 3 months

## 2024-06-18 NOTE — TELEPHONE ENCOUNTER
"Good afternoon,  Home care received a referral for this patient. The provider comment says \"nurse to help with ADL's\". This is not a service we provide. We could provide a nurse for education and skilled assessment. Let us know if we should move forward with this referral with these limitations.    Thank you,   [facility] Intake  "

## 2024-06-24 ENCOUNTER — HOME CARE VISIT (OUTPATIENT)
Dept: HOME HEALTH SERVICES | Facility: HOME HEALTH | Age: 84
End: 2024-06-24
Payer: MEDICARE

## 2024-06-24 VITALS
SYSTOLIC BLOOD PRESSURE: 128 MMHG | RESPIRATION RATE: 18 BRPM | WEIGHT: 192.25 LBS | OXYGEN SATURATION: 92 % | HEIGHT: 66 IN | HEART RATE: 66 BPM | BODY MASS INDEX: 30.9 KG/M2 | TEMPERATURE: 97.4 F | DIASTOLIC BLOOD PRESSURE: 70 MMHG

## 2024-06-24 PROCEDURE — G0299 HHS/HOSPICE OF RN EA 15 MIN: HCPCS | Mod: HHH

## 2024-06-24 PROCEDURE — 1090000001 HH PPS REVENUE CREDIT

## 2024-06-24 PROCEDURE — 169592 NO-PAY CLAIM PROCEDURE

## 2024-06-24 PROCEDURE — 0023 HH SOC

## 2024-06-24 PROCEDURE — 1090000002 HH PPS REVENUE DEBIT

## 2024-06-24 ASSESSMENT — PAIN SCALES - PAIN ASSESSMENT IN ADVANCED DEMENTIA (PAINAD)
NEGVOCALIZATION: 1
BODYLANGUAGE: 0
BODYLANGUAGE: 0 - RELAXED.
TOTALSCORE: 1
CONSOLABILITY: 0
BREATHING: 0
FACIALEXPRESSION: 0
CONSOLABILITY: 0 - NO NEED TO CONSOLE.
FACIALEXPRESSION: 0 - SMILING OR INEXPRESSIVE.
NEGVOCALIZATION: 1 - OCCASIONAL MOAN OR GROAN. LOW-LEVEL SPEECH WITH A NEGATIVE OR DISAPPROVING QUALITY.

## 2024-06-24 ASSESSMENT — ACTIVITIES OF DAILY LIVING (ADL)
TRANSPORTATION ASSESSED: 1
TOILETING: ONE PERSON
BATHING_CURRENT_FUNCTION: ONE PERSON
TOILETING: MODERATE ASSIST
ENTERING_EXITING_HOME: MODERATE ASSIST
AMBULATION ASSISTANCE: 1
TELEPHONE USE ASSESSED: 1
BATHING_CURRENT_FUNCTION: MAXIMUM ASSIST
DRESSING_LB_CURRENT_FUNCTION: MODERATE ASSIST
LAUNDRY: DEPENDENT
DRESSING_UB_CURRENT_FUNCTION: MODERATE ASSIST
HOUSEKEEPING ASSESSED: 1
ORAL_CARE_ASSESSED: 1
CURRENT_FUNCTION: CONTACT GUARD ASSIST
SHOPPING: DEPENDENT
PREPARING MEALS: DEPENDENT
GROOMING ASSESSED: 1
GROOMING_CURRENT_FUNCTION: MODERATE ASSIST
TRANSPORTATION: DEPENDENT
FEEDING: STAND BY ASSIST
LIGHT HOUSEKEEPING: DEPENDENT
TOILETING: 1
LAUNDRY ASSESSED: 1
AMBULATION ASSISTANCE: STAND BY ASSIST
SHOPPING ASSESSED: 1
USING THE TELPHONE: DEPENDENT
OASIS_M1830: 03
BATHING ASSESSED: 1
CURRENT_FUNCTION: STAND BY ASSIST
ORAL_CARE_CURRENT_FUNCTION: NEEDS ASSISTANCE
FEEDING ASSESSED: 1
PHYSICAL TRANSFERS ASSESSED: 1

## 2024-06-24 ASSESSMENT — ENCOUNTER SYMPTOMS
DENIES PAIN: 1
FORGETFULNESS: 1
PERSON REPORTING PAIN: PATIENT
DESCRIPTION OF MEMORY LOSS: IMMEDIATE
MUSCLE WEAKNESS: 1
OCCASIONAL FEELINGS OF UNSTEADINESS: 1
APPETITE LEVEL: GOOD
FATIGUE: 1
DIZZINESS: 1
DESCRIPTION OF MEMORY LOSS: SHORT TERM
DRY SKIN: 1
LOWER EXTREMITY EDEMA: 1
DYSPNEA ON EXERTION: 1
CHANGE IN APPETITE: UNCHANGED
DEPRESSION: 0
DESCRIPTION OF MEMORY LOSS: LONG TERM
LOSS OF SENSATION IN FEET: 1
FATIGUES EASILY: 1
DYSPNEA ACTIVITY LEVEL: AFTER AMBULATING MORE THAN 20 FT
LIMITED RANGE OF MOTION: 1
SHORTNESS OF BREATH: 1

## 2024-06-25 PROCEDURE — 1090000001 HH PPS REVENUE CREDIT

## 2024-06-25 PROCEDURE — 1090000002 HH PPS REVENUE DEBIT

## 2024-06-26 PROCEDURE — 1090000002 HH PPS REVENUE DEBIT

## 2024-06-26 PROCEDURE — 1090000001 HH PPS REVENUE CREDIT

## 2024-06-27 ENCOUNTER — HOME CARE VISIT (OUTPATIENT)
Dept: HOME HEALTH SERVICES | Facility: HOME HEALTH | Age: 84
End: 2024-06-27
Payer: MEDICARE

## 2024-06-27 PROCEDURE — 1090000001 HH PPS REVENUE CREDIT

## 2024-06-27 PROCEDURE — 1090000002 HH PPS REVENUE DEBIT

## 2024-06-27 PROCEDURE — G0299 HHS/HOSPICE OF RN EA 15 MIN: HCPCS | Mod: HHH

## 2024-06-28 PROCEDURE — G0180 MD CERTIFICATION HHA PATIENT: HCPCS | Performed by: INTERNAL MEDICINE

## 2024-06-28 PROCEDURE — 1090000002 HH PPS REVENUE DEBIT

## 2024-06-28 PROCEDURE — 1090000001 HH PPS REVENUE CREDIT

## 2024-06-29 VITALS
TEMPERATURE: 97.4 F | HEART RATE: 82 BPM | SYSTOLIC BLOOD PRESSURE: 128 MMHG | RESPIRATION RATE: 18 BRPM | OXYGEN SATURATION: 92 % | DIASTOLIC BLOOD PRESSURE: 70 MMHG

## 2024-06-29 PROCEDURE — 1090000001 HH PPS REVENUE CREDIT

## 2024-06-29 PROCEDURE — 1090000002 HH PPS REVENUE DEBIT

## 2024-06-29 ASSESSMENT — PAIN SCALES - PAIN ASSESSMENT IN ADVANCED DEMENTIA (PAINAD)
BODYLANGUAGE: 0 - RELAXED.
NEGVOCALIZATION: 0
FACIALEXPRESSION: 0
BREATHING: 0
TOTALSCORE: 0
NEGVOCALIZATION: 0 - NONE.
FACIALEXPRESSION: 0 - SMILING OR INEXPRESSIVE.
CONSOLABILITY: 0
CONSOLABILITY: 0 - NO NEED TO CONSOLE.
BODYLANGUAGE: 0

## 2024-06-29 ASSESSMENT — ACTIVITIES OF DAILY LIVING (ADL)
DRESSING_LB_CURRENT_FUNCTION: CONTACT GUARD ASSIST
BATHING_CURRENT_FUNCTION: ONE PERSON
GROOMING ASSESSED: 1
TOILETING: 1
SHOPPING ASSESSED: 1
LAUNDRY: DEPENDENT
CURRENT_FUNCTION: SUPERVISION
TELEPHONE USE ASSESSED: 1
USING THE TELPHONE: DEPENDENT
TOILETING: ONE PERSON
LAUNDRY ASSESSED: 1
TRANSPORTATION: DEPENDENT
TRANSPORTATION ASSESSED: 1
AMBULATION ASSISTANCE: STAND BY ASSIST
DRESSING_UB_CURRENT_FUNCTION: STAND BY ASSIST
PHYSICAL TRANSFERS ASSESSED: 1
HOUSEKEEPING ASSESSED: 1
FEEDING: SUPERVISION
PREPARING MEALS: DEPENDENT
FEEDING ASSESSED: 1
GROOMING_CURRENT_FUNCTION: CONTACT GUARD ASSIST
LIGHT HOUSEKEEPING: DEPENDENT
BATHING ASSESSED: 1
SHOPPING: DEPENDENT
BATHING_CURRENT_FUNCTION: MODERATE ASSIST
TOILETING: MODERATE ASSIST
AMBULATION ASSISTANCE: 1
ORAL_CARE_CURRENT_FUNCTION: NEEDS ASSISTANCE
ORAL_CARE_ASSESSED: 1

## 2024-06-29 ASSESSMENT — ENCOUNTER SYMPTOMS
FATIGUES EASILY: 1
MUSCLE WEAKNESS: 1
CHANGE IN APPETITE: UNCHANGED
DENIES PAIN: 1
FATIGUE: 1
LOWER EXTREMITY EDEMA: 1
DESCRIPTION OF MEMORY LOSS: IMMEDIATE
DEPRESSED MOOD: 1
PERSON REPORTING PAIN: PATIENT
FORGETFULNESS: 1
APPETITE LEVEL: GOOD
DESCRIPTION OF MEMORY LOSS: LONG TERM
AGITATION: 1
DESCRIPTION OF MEMORY LOSS: SHORT TERM

## 2024-07-01 ENCOUNTER — HOME CARE VISIT (OUTPATIENT)
Dept: HOME HEALTH SERVICES | Facility: HOME HEALTH | Age: 84
End: 2024-07-01
Payer: MEDICARE

## 2024-07-01 VITALS
HEART RATE: 75 BPM | RESPIRATION RATE: 20 BRPM | DIASTOLIC BLOOD PRESSURE: 68 MMHG | SYSTOLIC BLOOD PRESSURE: 156 MMHG | TEMPERATURE: 98.5 F

## 2024-07-01 PROCEDURE — G0299 HHS/HOSPICE OF RN EA 15 MIN: HCPCS | Mod: HHH

## 2024-07-01 ASSESSMENT — PAIN SCALES - PAIN ASSESSMENT IN ADVANCED DEMENTIA (PAINAD)
FACIALEXPRESSION: 0 - SMILING OR INEXPRESSIVE.
CONSOLABILITY: 0
BODYLANGUAGE: 0 - RELAXED.
CONSOLABILITY: 0 - NO NEED TO CONSOLE.
BREATHING: 0
NEGVOCALIZATION: 0 - NONE.
FACIALEXPRESSION: 0
BODYLANGUAGE: 0
TOTALSCORE: 0
NEGVOCALIZATION: 0

## 2024-07-01 ASSESSMENT — ENCOUNTER SYMPTOMS
LOWER EXTREMITY EDEMA: 1
APPETITE LEVEL: FAIR
MUSCLE WEAKNESS: 1
FATIGUE: 1
FATIGUES EASILY: 1
LIMITED RANGE OF MOTION: 1
DESCRIPTION OF MEMORY LOSS: SHORT TERM
DENIES PAIN: 1
FORGETFULNESS: 1
PERSON REPORTING PAIN: PATIENT
DESCRIPTION OF MEMORY LOSS: LONG TERM
DESCRIPTION OF MEMORY LOSS: IMMEDIATE

## 2024-07-01 ASSESSMENT — ACTIVITIES OF DAILY LIVING (ADL)
CURRENT_FUNCTION: STAND BY ASSIST
AMBULATION ASSISTANCE: STAND BY ASSIST

## 2024-07-04 DIAGNOSIS — F03.C0 SEVERE DEMENTIA, UNSPECIFIED DEMENTIA TYPE, UNSPECIFIED WHETHER BEHAVIORAL, PSYCHOTIC, OR MOOD DISTURBANCE OR ANXIETY (MULTI): ICD-10-CM

## 2024-07-05 RX ORDER — DIVALPROEX SODIUM 125 MG/1
125 TABLET, DELAYED RELEASE ORAL NIGHTLY
Qty: 90 TABLET | Refills: 1 | Status: SHIPPED | OUTPATIENT
Start: 2024-07-05

## 2024-07-08 ENCOUNTER — HOME CARE VISIT (OUTPATIENT)
Dept: HOME HEALTH SERVICES | Facility: HOME HEALTH | Age: 84
End: 2024-07-08
Payer: MEDICARE

## 2024-07-08 VITALS
SYSTOLIC BLOOD PRESSURE: 122 MMHG | OXYGEN SATURATION: 91 % | HEART RATE: 60 BPM | DIASTOLIC BLOOD PRESSURE: 62 MMHG | TEMPERATURE: 97.7 F

## 2024-07-08 PROCEDURE — G0299 HHS/HOSPICE OF RN EA 15 MIN: HCPCS | Mod: HHH

## 2024-07-08 ASSESSMENT — ACTIVITIES OF DAILY LIVING (ADL)
AMBULATION ASSISTANCE: STAND BY ASSIST
CURRENT_FUNCTION: STAND BY ASSIST

## 2024-07-08 ASSESSMENT — ENCOUNTER SYMPTOMS
LAST BOWEL MOVEMENT: 67028
APPETITE LEVEL: FAIR
DENIES PAIN: 1
DESCRIPTION OF MEMORY LOSS: IMMEDIATE
CHANGE IN APPETITE: UNCHANGED
MUSCLE WEAKNESS: 1
HYPOTENSION: 1
LOWER EXTREMITY EDEMA: 1
DESCRIPTION OF MEMORY LOSS: LONG TERM
LIMITED RANGE OF MOTION: 1
FORGETFULNESS: 1
FATIGUE: 1
DESCRIPTION OF MEMORY LOSS: SHORT TERM
PERSON REPORTING PAIN: PATIENT
FATIGUES EASILY: 1

## 2024-07-13 ENCOUNTER — HOME CARE VISIT (OUTPATIENT)
Dept: HOME HEALTH SERVICES | Facility: HOME HEALTH | Age: 84
End: 2024-07-13
Payer: MEDICARE

## 2024-07-13 VITALS
OXYGEN SATURATION: 91 % | WEIGHT: 189.5 LBS | BODY MASS INDEX: 30.59 KG/M2 | HEART RATE: 80 BPM | TEMPERATURE: 97.5 F | DIASTOLIC BLOOD PRESSURE: 88 MMHG | SYSTOLIC BLOOD PRESSURE: 156 MMHG | RESPIRATION RATE: 20 BRPM

## 2024-07-13 PROCEDURE — G0299 HHS/HOSPICE OF RN EA 15 MIN: HCPCS | Mod: HHH

## 2024-07-13 ASSESSMENT — PAIN SCALES - PAIN ASSESSMENT IN ADVANCED DEMENTIA (PAINAD)
CONSOLABILITY: 0 - NO NEED TO CONSOLE.
TOTALSCORE: 0
BODYLANGUAGE: 0 - RELAXED.
NEGVOCALIZATION: 0 - NONE.
CONSOLABILITY: 0
FACIALEXPRESSION: 0 - SMILING OR INEXPRESSIVE.
FACIALEXPRESSION: 0
BODYLANGUAGE: 0
BREATHING: 0
NEGVOCALIZATION: 0

## 2024-07-13 ASSESSMENT — ENCOUNTER SYMPTOMS
DESCRIPTION OF MEMORY LOSS: LONG TERM
DESCRIPTION OF MEMORY LOSS: IMMEDIATE
HYPERTENSION: 1
FATIGUE: 1
LOWER EXTREMITY EDEMA: 1
LIMITED RANGE OF MOTION: 1
DENIES PAIN: 1
MUSCLE WEAKNESS: 1
DESCRIPTION OF MEMORY LOSS: SHORT TERM
CHANGE IN APPETITE: UNCHANGED
APPETITE LEVEL: GOOD
FATIGUES EASILY: 1
PERSON REPORTING PAIN: PATIENT
FORGETFULNESS: 1

## 2024-07-13 ASSESSMENT — ACTIVITIES OF DAILY LIVING (ADL)
AMBULATION ASSISTANCE: STAND BY ASSIST
CURRENT_FUNCTION: STAND BY ASSIST

## 2024-07-13 NOTE — CASE COMMUNICATION
FYI: Saw pt as PRN visit Saturday   per family increased dyspnea and wound behind lT knee  Pt is stable no dyspnea   Pt's wound is dry   he scratches  nothing needed at this time  BP elevated  156/88  this is high for him   Apical 80   91RA.    He's on Lasix 40mg twice a day  Do you want a BMP week of 7/15/24?  Thank you

## 2024-07-16 ENCOUNTER — HOME CARE VISIT (OUTPATIENT)
Dept: HOME HEALTH SERVICES | Facility: HOME HEALTH | Age: 84
End: 2024-07-16
Payer: MEDICARE

## 2024-07-16 VITALS
OXYGEN SATURATION: 93 % | HEART RATE: 70 BPM | SYSTOLIC BLOOD PRESSURE: 150 MMHG | DIASTOLIC BLOOD PRESSURE: 82 MMHG | RESPIRATION RATE: 20 BRPM | TEMPERATURE: 97.7 F

## 2024-07-16 PROCEDURE — G0299 HHS/HOSPICE OF RN EA 15 MIN: HCPCS | Mod: HHH

## 2024-07-16 ASSESSMENT — ENCOUNTER SYMPTOMS
MUSCLE WEAKNESS: 1
HYPERTENSION: 1
LIMITED RANGE OF MOTION: 1
CHANGE IN APPETITE: UNCHANGED
FATIGUES EASILY: 1
PERSON REPORTING PAIN: PATIENT
DENIES PAIN: 1
APPETITE LEVEL: FAIR
LOWER EXTREMITY EDEMA: 1
DESCRIPTION OF MEMORY LOSS: SHORT TERM
DESCRIPTION OF MEMORY LOSS: IMMEDIATE
FATIGUE: 1
DESCRIPTION OF MEMORY LOSS: LONG TERM
FORGETFULNESS: 1

## 2024-07-16 ASSESSMENT — PAIN SCALES - PAIN ASSESSMENT IN ADVANCED DEMENTIA (PAINAD)
CONSOLABILITY: 0
BREATHING: 0
NEGVOCALIZATION: 0 - NONE.
NEGVOCALIZATION: 0
FACIALEXPRESSION: 0 - SMILING OR INEXPRESSIVE.
BODYLANGUAGE: 0 - RELAXED.
FACIALEXPRESSION: 0
BODYLANGUAGE: 0
CONSOLABILITY: 0 - NO NEED TO CONSOLE.
TOTALSCORE: 0

## 2024-07-16 ASSESSMENT — ACTIVITIES OF DAILY LIVING (ADL)
AMBULATION ASSISTANCE: STAND BY ASSIST
CURRENT_FUNCTION: STAND BY ASSIST

## 2024-07-23 ENCOUNTER — HOME CARE VISIT (OUTPATIENT)
Dept: HOME HEALTH SERVICES | Facility: HOME HEALTH | Age: 84
End: 2024-07-23
Payer: MEDICARE

## 2024-07-23 VITALS
RESPIRATION RATE: 20 BRPM | SYSTOLIC BLOOD PRESSURE: 158 MMHG | DIASTOLIC BLOOD PRESSURE: 82 MMHG | TEMPERATURE: 98.1 F | HEART RATE: 62 BPM | OXYGEN SATURATION: 92 %

## 2024-07-23 PROCEDURE — G0299 HHS/HOSPICE OF RN EA 15 MIN: HCPCS | Mod: HHH

## 2024-07-23 ASSESSMENT — ENCOUNTER SYMPTOMS
APPETITE LEVEL: GOOD
SHORTNESS OF BREATH: 1
DESCRIPTION OF MEMORY LOSS: LONG TERM
LIMITED RANGE OF MOTION: 1
DYSPNEA ACTIVITY LEVEL: AFTER AMBULATING MORE THAN 20 FT
CHANGE IN APPETITE: UNCHANGED
DESCRIPTION OF MEMORY LOSS: SHORT TERM
FORGETFULNESS: 1
AGITATION: 1
DESCRIPTION OF MEMORY LOSS: IMMEDIATE
MUSCLE WEAKNESS: 1
FATIGUES EASILY: 1
PERSON REPORTING PAIN: PATIENT
DYSPNEA ON EXERTION: 1
FATIGUE: 1
DENIES PAIN: 1
HYPERTENSION: 1

## 2024-07-23 ASSESSMENT — PAIN SCALES - PAIN ASSESSMENT IN ADVANCED DEMENTIA (PAINAD)
CONSOLABILITY: 0 - NO NEED TO CONSOLE.
BODYLANGUAGE: 0
FACIALEXPRESSION: 0
NEGVOCALIZATION: 0
CONSOLABILITY: 0
BREATHING: 0
BODYLANGUAGE: 0 - RELAXED.
FACIALEXPRESSION: 0 - SMILING OR INEXPRESSIVE.
TOTALSCORE: 0
NEGVOCALIZATION: 0 - NONE.

## 2024-07-23 ASSESSMENT — ACTIVITIES OF DAILY LIVING (ADL)
AMBULATION ASSISTANCE: STAND BY ASSIST
CURRENT_FUNCTION: STAND BY ASSIST

## 2024-07-30 ENCOUNTER — HOME CARE VISIT (OUTPATIENT)
Dept: HOME HEALTH SERVICES | Facility: HOME HEALTH | Age: 84
End: 2024-07-30
Payer: MEDICARE

## 2024-07-30 VITALS
RESPIRATION RATE: 18 BRPM | DIASTOLIC BLOOD PRESSURE: 70 MMHG | TEMPERATURE: 98.5 F | SYSTOLIC BLOOD PRESSURE: 136 MMHG | HEART RATE: 62 BPM | OXYGEN SATURATION: 93 %

## 2024-07-30 PROCEDURE — G0299 HHS/HOSPICE OF RN EA 15 MIN: HCPCS | Mod: HHH

## 2024-07-30 SDOH — ECONOMIC STABILITY: GENERAL

## 2024-07-30 ASSESSMENT — PAIN SCALES - PAIN ASSESSMENT IN ADVANCED DEMENTIA (PAINAD)
TOTALSCORE: 0
NEGVOCALIZATION: 0
BODYLANGUAGE: 0 - RELAXED.
BREATHING: 0
CONSOLABILITY: 0
CONSOLABILITY: 0 - NO NEED TO CONSOLE.
BODYLANGUAGE: 0
FACIALEXPRESSION: 0
FACIALEXPRESSION: 0 - SMILING OR INEXPRESSIVE.
NEGVOCALIZATION: 0 - NONE.

## 2024-07-30 ASSESSMENT — ENCOUNTER SYMPTOMS
DYSPNEA ACTIVITY LEVEL: AFTER AMBULATING 10 - 20 FT
SHORTNESS OF BREATH: 1
FATIGUE: 1
DENIES PAIN: 1
MUSCLE WEAKNESS: 1
PERSON REPORTING PAIN: PATIENT
AGITATION: 1
DYSPNEA ON EXERTION: 1
DIZZINESS: 1
LOWER EXTREMITY EDEMA: 1
DESCRIPTION OF MEMORY LOSS: IMMEDIATE
LIMITED RANGE OF MOTION: 1
FORGETFULNESS: 1
APPETITE LEVEL: GOOD
FATIGUES EASILY: 1
LAST BOWEL MOVEMENT: 67050
CHANGE IN APPETITE: UNCHANGED
DESCRIPTION OF MEMORY LOSS: SHORT TERM
DESCRIPTION OF MEMORY LOSS: LONG TERM

## 2024-07-30 ASSESSMENT — ACTIVITIES OF DAILY LIVING (ADL)
USING THE TELPHONE: DEPENDENT
GROOMING_CURRENT_FUNCTION: CONTACT GUARD ASSIST
AMBULATION ASSISTANCE: 1
CURRENT_FUNCTION: SUPERVISION
PREPARING MEALS: DEPENDENT
OASIS_M1830: 03
CURRENT_FUNCTION: STAND BY ASSIST
ORAL_CARE_ASSESSED: 1
DRESSING_UB_CURRENT_FUNCTION: CONTACT GUARD ASSIST
PHYSICAL TRANSFERS ASSESSED: 1
ORAL_CARE_CURRENT_FUNCTION: NEEDS ASSISTANCE
TOILETING: 1
LAUNDRY: DEPENDENT
GROOMING ASSESSED: 1
FEEDING ASSESSED: 1
HOUSEKEEPING ASSESSED: 1
TELEPHONE USE ASSESSED: 1
MONEY MANAGEMENT (EXPENSES/BILLS): TOTALLY DEPENDENT
TOILETING: MINIMUM ASSIST
DRESSING_LB_CURRENT_FUNCTION: MODERATE ASSIST
TRANSPORTATION ASSESSED: 1
BATHING ASSESSED: 1
FEEDING: SUPERVISION
LAUNDRY ASSESSED: 1
TRANSPORTATION: DEPENDENT
SHOPPING ASSESSED: 1
HOME_HEALTH_OASIS: 01
SHOPPING: DEPENDENT
BATHING_CURRENT_FUNCTION: MODERATE ASSIST
LIGHT HOUSEKEEPING: DEPENDENT
AMBULATION ASSISTANCE: STAND BY ASSIST

## 2024-08-06 ENCOUNTER — TELEPHONE (OUTPATIENT)
Dept: PRIMARY CARE | Facility: CLINIC | Age: 84
End: 2024-08-06
Payer: MEDICARE

## 2024-08-06 DIAGNOSIS — R05.9 COUGH, UNSPECIFIED TYPE: ICD-10-CM

## 2024-08-06 RX ORDER — AZITHROMYCIN 250 MG/1
250 TABLET, FILM COATED ORAL DAILY
COMMUNITY
End: 2024-08-06 | Stop reason: SDUPTHER

## 2024-08-06 RX ORDER — AZITHROMYCIN 250 MG/1
250 TABLET, FILM COATED ORAL DAILY
Qty: 6 TABLET | Refills: 0 | Status: SHIPPED | OUTPATIENT
Start: 2024-08-06 | End: 2024-08-10 | Stop reason: HOSPADM

## 2024-08-06 NOTE — TELEPHONE ENCOUNTER
Blu MCLEOD [id number] Clinical Support Staff (supporting Toya Dickey MD)9 hours ago (10:42 PM)       Hi, I am sending this message on behalf of my Dad. He has a cough, has been blowing his nose and has been sleeping a lot. He may just have a cold, but we don't want to miss PN etc. Do you think we should bring him in (and could you get him in), not sure if he needs antibiotics or should we give it a little time. He came down with the cough on Sunday. Thanks!

## 2024-08-08 ENCOUNTER — HOSPITAL ENCOUNTER (INPATIENT)
Facility: HOSPITAL | Age: 84
LOS: 1 days | Discharge: HOME | End: 2024-08-10
Attending: EMERGENCY MEDICINE | Admitting: INTERNAL MEDICINE
Payer: MEDICARE

## 2024-08-08 ENCOUNTER — APPOINTMENT (OUTPATIENT)
Dept: RADIOLOGY | Facility: HOSPITAL | Age: 84
End: 2024-08-08
Payer: MEDICARE

## 2024-08-08 ENCOUNTER — APPOINTMENT (OUTPATIENT)
Dept: CARDIOLOGY | Facility: HOSPITAL | Age: 84
End: 2024-08-08
Payer: MEDICARE

## 2024-08-08 DIAGNOSIS — R09.02 HYPOXIA: ICD-10-CM

## 2024-08-08 DIAGNOSIS — R41.82 ALTERED MENTAL STATUS, UNSPECIFIED ALTERED MENTAL STATUS TYPE: Primary | ICD-10-CM

## 2024-08-08 DIAGNOSIS — J15.9 COMMUNITY ACQUIRED BACTERIAL PNEUMONIA: ICD-10-CM

## 2024-08-08 DIAGNOSIS — J18.9 PNEUMONIA DUE TO INFECTIOUS ORGANISM, UNSPECIFIED LATERALITY, UNSPECIFIED PART OF LUNG: ICD-10-CM

## 2024-08-08 LAB
ALBUMIN SERPL BCP-MCNC: 3.6 G/DL (ref 3.4–5)
ALP SERPL-CCNC: 83 U/L (ref 33–136)
ALT SERPL W P-5'-P-CCNC: 22 U/L (ref 10–52)
ANION GAP SERPL CALC-SCNC: 10 MMOL/L (ref 10–20)
APPEARANCE UR: CLEAR
AST SERPL W P-5'-P-CCNC: 24 U/L (ref 9–39)
BASOPHILS # BLD AUTO: 0.05 X10*3/UL (ref 0–0.1)
BASOPHILS NFR BLD AUTO: 0.4 %
BILIRUB SERPL-MCNC: 1 MG/DL (ref 0–1.2)
BILIRUB UR STRIP.AUTO-MCNC: NEGATIVE MG/DL
BNP SERPL-MCNC: 1141 PG/ML (ref 0–99)
BUN SERPL-MCNC: 23 MG/DL (ref 6–23)
CALCIUM SERPL-MCNC: 9 MG/DL (ref 8.6–10.3)
CHLORIDE SERPL-SCNC: 104 MMOL/L (ref 98–107)
CO2 SERPL-SCNC: 32 MMOL/L (ref 21–32)
COLOR UR: YELLOW
CREAT SERPL-MCNC: 1.13 MG/DL (ref 0.5–1.3)
EGFRCR SERPLBLD CKD-EPI 2021: 64 ML/MIN/1.73M*2
EOSINOPHIL # BLD AUTO: 0.02 X10*3/UL (ref 0–0.4)
EOSINOPHIL NFR BLD AUTO: 0.2 %
ERYTHROCYTE [DISTWIDTH] IN BLOOD BY AUTOMATED COUNT: 16.8 % (ref 11.5–14.5)
GLUCOSE SERPL-MCNC: 148 MG/DL (ref 74–99)
GLUCOSE UR STRIP.AUTO-MCNC: NORMAL MG/DL
HCT VFR BLD AUTO: 43.4 % (ref 41–52)
HGB BLD-MCNC: 13.4 G/DL (ref 13.5–17.5)
IMM GRANULOCYTES # BLD AUTO: 0.06 X10*3/UL (ref 0–0.5)
IMM GRANULOCYTES NFR BLD AUTO: 0.5 % (ref 0–0.9)
KETONES UR STRIP.AUTO-MCNC: NEGATIVE MG/DL
LACTATE SERPL-SCNC: 1 MMOL/L (ref 0.4–2)
LEUKOCYTE ESTERASE UR QL STRIP.AUTO: NEGATIVE
LYMPHOCYTES # BLD AUTO: 0.72 X10*3/UL (ref 0.8–3)
LYMPHOCYTES NFR BLD AUTO: 5.9 %
MCH RBC QN AUTO: 28.6 PG (ref 26–34)
MCHC RBC AUTO-ENTMCNC: 30.9 G/DL (ref 32–36)
MCV RBC AUTO: 93 FL (ref 80–100)
MONOCYTES # BLD AUTO: 1.13 X10*3/UL (ref 0.05–0.8)
MONOCYTES NFR BLD AUTO: 9.2 %
MUCOUS THREADS #/AREA URNS AUTO: ABNORMAL /LPF
NEUTROPHILS # BLD AUTO: 10.29 X10*3/UL (ref 1.6–5.5)
NEUTROPHILS NFR BLD AUTO: 83.8 %
NITRITE UR QL STRIP.AUTO: NEGATIVE
NRBC BLD-RTO: 0 /100 WBCS (ref 0–0)
PH UR STRIP.AUTO: 6.5 [PH]
PLATELET # BLD AUTO: 220 X10*3/UL (ref 150–450)
POTASSIUM SERPL-SCNC: 3.8 MMOL/L (ref 3.5–5.3)
PROT SERPL-MCNC: 7.1 G/DL (ref 6.4–8.2)
PROT UR STRIP.AUTO-MCNC: ABNORMAL MG/DL
RBC # BLD AUTO: 4.68 X10*6/UL (ref 4.5–5.9)
RBC # UR STRIP.AUTO: ABNORMAL /UL
RBC #/AREA URNS AUTO: ABNORMAL /HPF
SARS-COV-2 RNA RESP QL NAA+PROBE: NOT DETECTED
SODIUM SERPL-SCNC: 142 MMOL/L (ref 136–145)
SP GR UR STRIP.AUTO: >1.05
SQUAMOUS #/AREA URNS AUTO: ABNORMAL /HPF
UROBILINOGEN UR STRIP.AUTO-MCNC: NORMAL MG/DL
WBC # BLD AUTO: 12.3 X10*3/UL (ref 4.4–11.3)
WBC #/AREA URNS AUTO: ABNORMAL /HPF

## 2024-08-08 PROCEDURE — 2500000004 HC RX 250 GENERAL PHARMACY W/ HCPCS (ALT 636 FOR OP/ED): Performed by: EMERGENCY MEDICINE

## 2024-08-08 PROCEDURE — 87205 SMEAR GRAM STAIN: CPT | Mod: GENLAB

## 2024-08-08 PROCEDURE — 97165 OT EVAL LOW COMPLEX 30 MIN: CPT | Mod: GO | Performed by: OCCUPATIONAL THERAPIST

## 2024-08-08 PROCEDURE — 87070 CULTURE OTHR SPECIMN AEROBIC: CPT | Mod: GENLAB

## 2024-08-08 PROCEDURE — 70450 CT HEAD/BRAIN W/O DYE: CPT

## 2024-08-08 PROCEDURE — 83880 ASSAY OF NATRIURETIC PEPTIDE: CPT

## 2024-08-08 PROCEDURE — 83605 ASSAY OF LACTIC ACID: CPT | Performed by: EMERGENCY MEDICINE

## 2024-08-08 PROCEDURE — 80053 COMPREHEN METABOLIC PANEL: CPT | Performed by: EMERGENCY MEDICINE

## 2024-08-08 PROCEDURE — 71275 CT ANGIOGRAPHY CHEST: CPT | Mod: FOREIGN READ | Performed by: RADIOLOGY

## 2024-08-08 PROCEDURE — 85025 COMPLETE CBC W/AUTO DIFF WBC: CPT | Performed by: EMERGENCY MEDICINE

## 2024-08-08 PROCEDURE — G0378 HOSPITAL OBSERVATION PER HR: HCPCS

## 2024-08-08 PROCEDURE — 96372 THER/PROPH/DIAG INJ SC/IM: CPT

## 2024-08-08 PROCEDURE — 2500000004 HC RX 250 GENERAL PHARMACY W/ HCPCS (ALT 636 FOR OP/ED)

## 2024-08-08 PROCEDURE — 93005 ELECTROCARDIOGRAM TRACING: CPT

## 2024-08-08 PROCEDURE — 36415 COLL VENOUS BLD VENIPUNCTURE: CPT | Performed by: EMERGENCY MEDICINE

## 2024-08-08 PROCEDURE — 99223 1ST HOSP IP/OBS HIGH 75: CPT

## 2024-08-08 PROCEDURE — 96361 HYDRATE IV INFUSION ADD-ON: CPT

## 2024-08-08 PROCEDURE — 70450 CT HEAD/BRAIN W/O DYE: CPT | Performed by: RADIOLOGY

## 2024-08-08 PROCEDURE — 94760 N-INVAS EAR/PLS OXIMETRY 1: CPT

## 2024-08-08 PROCEDURE — 81003 URINALYSIS AUTO W/O SCOPE: CPT | Performed by: EMERGENCY MEDICINE

## 2024-08-08 PROCEDURE — 2500000001 HC RX 250 WO HCPCS SELF ADMINISTERED DRUGS (ALT 637 FOR MEDICARE OP)

## 2024-08-08 PROCEDURE — 2550000001 HC RX 255 CONTRASTS: Performed by: EMERGENCY MEDICINE

## 2024-08-08 PROCEDURE — 87635 SARS-COV-2 COVID-19 AMP PRB: CPT | Performed by: EMERGENCY MEDICINE

## 2024-08-08 PROCEDURE — 87040 BLOOD CULTURE FOR BACTERIA: CPT | Mod: GENLAB | Performed by: EMERGENCY MEDICINE

## 2024-08-08 PROCEDURE — 81001 URINALYSIS AUTO W/SCOPE: CPT | Performed by: EMERGENCY MEDICINE

## 2024-08-08 PROCEDURE — 9420000001 HC RT PATIENT EDUCATION 5 MIN

## 2024-08-08 PROCEDURE — 94667 MNPJ CHEST WALL 1ST: CPT

## 2024-08-08 PROCEDURE — 99285 EMERGENCY DEPT VISIT HI MDM: CPT

## 2024-08-08 PROCEDURE — 84145 PROCALCITONIN (PCT): CPT | Mod: GENLAB

## 2024-08-08 PROCEDURE — 96365 THER/PROPH/DIAG IV INF INIT: CPT | Mod: 59

## 2024-08-08 PROCEDURE — 2500000005 HC RX 250 GENERAL PHARMACY W/O HCPCS

## 2024-08-08 PROCEDURE — 71275 CT ANGIOGRAPHY CHEST: CPT

## 2024-08-08 RX ORDER — MIRTAZAPINE 15 MG/1
15 TABLET, FILM COATED ORAL NIGHTLY
Status: DISCONTINUED | OUTPATIENT
Start: 2024-08-08 | End: 2024-08-10 | Stop reason: HOSPADM

## 2024-08-08 RX ORDER — ONDANSETRON 4 MG/1
4 TABLET, ORALLY DISINTEGRATING ORAL EVERY 8 HOURS PRN
Status: DISCONTINUED | OUTPATIENT
Start: 2024-08-08 | End: 2024-08-10 | Stop reason: HOSPADM

## 2024-08-08 RX ORDER — IPRATROPIUM BROMIDE AND ALBUTEROL SULFATE 2.5; .5 MG/3ML; MG/3ML
3 SOLUTION RESPIRATORY (INHALATION)
Status: DISCONTINUED | OUTPATIENT
Start: 2024-08-08 | End: 2024-08-08

## 2024-08-08 RX ORDER — CEFTRIAXONE 1 G/50ML
1 INJECTION, SOLUTION INTRAVENOUS EVERY 24 HOURS
Status: DISCONTINUED | OUTPATIENT
Start: 2024-08-09 | End: 2024-08-10 | Stop reason: HOSPADM

## 2024-08-08 RX ORDER — LISINOPRIL 20 MG/1
20 TABLET ORAL DAILY
Status: DISCONTINUED | OUTPATIENT
Start: 2024-08-08 | End: 2024-08-10 | Stop reason: HOSPADM

## 2024-08-08 RX ORDER — MEMANTINE HYDROCHLORIDE 5 MG/1
10 TABLET ORAL 2 TIMES DAILY
Status: DISCONTINUED | OUTPATIENT
Start: 2024-08-08 | End: 2024-08-10 | Stop reason: HOSPADM

## 2024-08-08 RX ORDER — ACETAMINOPHEN 650 MG/1
650 SUPPOSITORY RECTAL EVERY 4 HOURS PRN
Status: DISCONTINUED | OUTPATIENT
Start: 2024-08-08 | End: 2024-08-10 | Stop reason: HOSPADM

## 2024-08-08 RX ORDER — DIVALPROEX SODIUM 125 MG/1
125 TABLET, DELAYED RELEASE ORAL NIGHTLY
Status: DISCONTINUED | OUTPATIENT
Start: 2024-08-08 | End: 2024-08-10 | Stop reason: HOSPADM

## 2024-08-08 RX ORDER — ACETAMINOPHEN 160 MG/5ML
650 SOLUTION ORAL EVERY 4 HOURS PRN
Status: DISCONTINUED | OUTPATIENT
Start: 2024-08-08 | End: 2024-08-10 | Stop reason: HOSPADM

## 2024-08-08 RX ORDER — GUAIFENESIN/DEXTROMETHORPHAN 100-10MG/5
5 SYRUP ORAL EVERY 4 HOURS PRN
Status: DISCONTINUED | OUTPATIENT
Start: 2024-08-08 | End: 2024-08-10 | Stop reason: HOSPADM

## 2024-08-08 RX ORDER — POLYETHYLENE GLYCOL 3350 17 G/17G
17 POWDER, FOR SOLUTION ORAL DAILY
Status: DISCONTINUED | OUTPATIENT
Start: 2024-08-08 | End: 2024-08-10 | Stop reason: HOSPADM

## 2024-08-08 RX ORDER — TALC
6 POWDER (GRAM) TOPICAL NIGHTLY PRN
Status: DISCONTINUED | OUTPATIENT
Start: 2024-08-08 | End: 2024-08-10 | Stop reason: HOSPADM

## 2024-08-08 RX ORDER — DONEPEZIL HYDROCHLORIDE 5 MG/1
10 TABLET, FILM COATED ORAL DAILY
Status: DISCONTINUED | OUTPATIENT
Start: 2024-08-08 | End: 2024-08-10 | Stop reason: HOSPADM

## 2024-08-08 RX ORDER — ONDANSETRON HYDROCHLORIDE 2 MG/ML
4 INJECTION, SOLUTION INTRAVENOUS EVERY 8 HOURS PRN
Status: DISCONTINUED | OUTPATIENT
Start: 2024-08-08 | End: 2024-08-10 | Stop reason: HOSPADM

## 2024-08-08 RX ORDER — ACETAMINOPHEN 325 MG/1
650 TABLET ORAL EVERY 4 HOURS PRN
Status: DISCONTINUED | OUTPATIENT
Start: 2024-08-08 | End: 2024-08-10 | Stop reason: HOSPADM

## 2024-08-08 RX ORDER — ENOXAPARIN SODIUM 100 MG/ML
40 INJECTION SUBCUTANEOUS EVERY 24 HOURS
Status: DISCONTINUED | OUTPATIENT
Start: 2024-08-08 | End: 2024-08-10 | Stop reason: HOSPADM

## 2024-08-08 RX ORDER — ALBUTEROL SULFATE 0.83 MG/ML
2.5 SOLUTION RESPIRATORY (INHALATION) EVERY 2 HOUR PRN
Status: DISCONTINUED | OUTPATIENT
Start: 2024-08-08 | End: 2024-08-08

## 2024-08-08 RX ORDER — NAPROXEN SODIUM 220 MG/1
81 TABLET, FILM COATED ORAL DAILY
Status: DISCONTINUED | OUTPATIENT
Start: 2024-08-08 | End: 2024-08-10 | Stop reason: HOSPADM

## 2024-08-08 RX ORDER — SODIUM CHLORIDE 9 MG/ML
INJECTION, SOLUTION INTRAVENOUS
Status: COMPLETED
Start: 2024-08-08 | End: 2024-08-08

## 2024-08-08 RX ORDER — CEFTRIAXONE 2 G/50ML
2 INJECTION, SOLUTION INTRAVENOUS EVERY 24 HOURS
Status: DISCONTINUED | OUTPATIENT
Start: 2024-08-08 | End: 2024-08-08

## 2024-08-08 RX ORDER — CARVEDILOL 3.12 MG/1
3.12 TABLET ORAL 2 TIMES DAILY
Status: DISCONTINUED | OUTPATIENT
Start: 2024-08-08 | End: 2024-08-10 | Stop reason: HOSPADM

## 2024-08-08 RX ORDER — ALBUTEROL SULFATE 0.83 MG/ML
2.5 SOLUTION RESPIRATORY (INHALATION) EVERY 2 HOUR PRN
Status: DISCONTINUED | OUTPATIENT
Start: 2024-08-08 | End: 2024-08-10 | Stop reason: HOSPADM

## 2024-08-08 RX ORDER — PANTOPRAZOLE SODIUM 40 MG/1
40 TABLET, DELAYED RELEASE ORAL
Status: DISCONTINUED | OUTPATIENT
Start: 2024-08-09 | End: 2024-08-10 | Stop reason: HOSPADM

## 2024-08-08 RX ORDER — PANTOPRAZOLE SODIUM 40 MG/10ML
40 INJECTION, POWDER, LYOPHILIZED, FOR SOLUTION INTRAVENOUS
Status: DISCONTINUED | OUTPATIENT
Start: 2024-08-09 | End: 2024-08-10 | Stop reason: HOSPADM

## 2024-08-08 RX ORDER — SODIUM CHLORIDE 9 MG/ML
10 INJECTION, SOLUTION INTRAVENOUS CONTINUOUS PRN
Status: DISCONTINUED | OUTPATIENT
Start: 2024-08-08 | End: 2024-08-10 | Stop reason: HOSPADM

## 2024-08-08 RX ORDER — GUAIFENESIN 600 MG/1
600 TABLET, EXTENDED RELEASE ORAL EVERY 12 HOURS PRN
Status: DISCONTINUED | OUTPATIENT
Start: 2024-08-08 | End: 2024-08-10 | Stop reason: HOSPADM

## 2024-08-08 RX ORDER — FUROSEMIDE 40 MG/1
40 TABLET ORAL
Status: DISCONTINUED | OUTPATIENT
Start: 2024-08-08 | End: 2024-08-10 | Stop reason: HOSPADM

## 2024-08-08 RX ADMIN — DOXYCYCLINE 100 MG: 100 INJECTION, POWDER, LYOPHILIZED, FOR SOLUTION INTRAVENOUS at 13:15

## 2024-08-08 RX ADMIN — CEFTRIAXONE 2 G: 2 INJECTION, SOLUTION INTRAVENOUS at 11:07

## 2024-08-08 RX ADMIN — ASPIRIN 81 MG CHEWABLE TABLET 81 MG: 81 TABLET CHEWABLE at 13:49

## 2024-08-08 RX ADMIN — CARVEDILOL 3.12 MG: 3.12 TABLET, FILM COATED ORAL at 13:50

## 2024-08-08 RX ADMIN — METHYLPREDNISOLONE SODIUM SUCCINATE 40 MG: 40 INJECTION, POWDER, FOR SOLUTION INTRAMUSCULAR; INTRAVENOUS at 21:30

## 2024-08-08 RX ADMIN — IOHEXOL 75 ML: 350 INJECTION, SOLUTION INTRAVENOUS at 10:25

## 2024-08-08 RX ADMIN — ENOXAPARIN SODIUM 40 MG: 40 INJECTION SUBCUTANEOUS at 13:15

## 2024-08-08 RX ADMIN — MIRTAZAPINE 15 MG: 15 TABLET, FILM COATED ORAL at 21:30

## 2024-08-08 RX ADMIN — LISINOPRIL 20 MG: 20 TABLET ORAL at 13:50

## 2024-08-08 RX ADMIN — MEMANTINE 10 MG: 5 TABLET ORAL at 13:50

## 2024-08-08 RX ADMIN — METHYLPREDNISOLONE SODIUM SUCCINATE 40 MG: 40 INJECTION, POWDER, FOR SOLUTION INTRAMUSCULAR; INTRAVENOUS at 13:15

## 2024-08-08 RX ADMIN — SODIUM CHLORIDE 250 ML: 9 INJECTION, SOLUTION INTRAVENOUS at 13:16

## 2024-08-08 RX ADMIN — DONEPEZIL HYDROCHLORIDE 10 MG: 5 TABLET ORAL at 13:50

## 2024-08-08 RX ADMIN — MEMANTINE 10 MG: 5 TABLET ORAL at 21:30

## 2024-08-08 RX ADMIN — SODIUM CHLORIDE, POTASSIUM CHLORIDE, SODIUM LACTATE AND CALCIUM CHLORIDE 1000 ML: 600; 310; 30; 20 INJECTION, SOLUTION INTRAVENOUS at 09:35

## 2024-08-08 RX ADMIN — Medication 2 L/MIN: at 14:00

## 2024-08-08 RX ADMIN — FUROSEMIDE 40 MG: 40 TABLET ORAL at 18:02

## 2024-08-08 RX ADMIN — CARVEDILOL 3.12 MG: 3.12 TABLET, FILM COATED ORAL at 21:30

## 2024-08-08 SDOH — SOCIAL STABILITY: SOCIAL INSECURITY: ABUSE: ADULT

## 2024-08-08 SDOH — SOCIAL STABILITY: SOCIAL INSECURITY: DOES ANYONE TRY TO KEEP YOU FROM HAVING/CONTACTING OTHER FRIENDS OR DOING THINGS OUTSIDE YOUR HOME?: UNABLE TO ASSESS

## 2024-08-08 SDOH — SOCIAL STABILITY: SOCIAL INSECURITY: HAS ANYONE EVER THREATENED TO HURT YOUR FAMILY OR YOUR PETS?: UNABLE TO ASSESS

## 2024-08-08 SDOH — SOCIAL STABILITY: SOCIAL INSECURITY: ARE THERE ANY APPARENT SIGNS OF INJURIES/BEHAVIORS THAT COULD BE RELATED TO ABUSE/NEGLECT?: UNABLE TO ASSESS

## 2024-08-08 SDOH — SOCIAL STABILITY: SOCIAL INSECURITY: HAVE YOU HAD ANY THOUGHTS OF HARMING ANYONE ELSE?: UNABLE TO ASSESS

## 2024-08-08 SDOH — SOCIAL STABILITY: SOCIAL INSECURITY: HAVE YOU HAD THOUGHTS OF HARMING ANYONE ELSE?: NO

## 2024-08-08 SDOH — SOCIAL STABILITY: SOCIAL INSECURITY: WERE YOU ABLE TO COMPLETE ALL THE BEHAVIORAL HEALTH SCREENINGS?: YES

## 2024-08-08 SDOH — SOCIAL STABILITY: SOCIAL INSECURITY: DO YOU FEEL UNSAFE GOING BACK TO THE PLACE WHERE YOU ARE LIVING?: UNABLE TO ASSESS

## 2024-08-08 SDOH — SOCIAL STABILITY: SOCIAL INSECURITY: DO YOU FEEL ANYONE HAS EXPLOITED OR TAKEN ADVANTAGE OF YOU FINANCIALLY OR OF YOUR PERSONAL PROPERTY?: UNABLE TO ASSESS

## 2024-08-08 SDOH — SOCIAL STABILITY: SOCIAL INSECURITY: ARE YOU OR HAVE YOU BEEN THREATENED OR ABUSED PHYSICALLY, EMOTIONALLY, OR SEXUALLY BY ANYONE?: UNABLE TO ASSESS

## 2024-08-08 ASSESSMENT — COGNITIVE AND FUNCTIONAL STATUS - GENERAL
DAILY ACTIVITIY SCORE: 16
DAILY ACTIVITIY SCORE: 18
DRESSING REGULAR UPPER BODY CLOTHING: A LITTLE
PATIENT BASELINE BEDBOUND: NO
PERSONAL GROOMING: A LITTLE
DRESSING REGULAR LOWER BODY CLOTHING: A LITTLE
HELP NEEDED FOR BATHING: A LITTLE
MOVING TO AND FROM BED TO CHAIR: A LOT
EATING MEALS: A LITTLE
HELP NEEDED FOR BATHING: A LITTLE
STANDING UP FROM CHAIR USING ARMS: A LOT
WALKING IN HOSPITAL ROOM: A LOT
PERSONAL GROOMING: A LITTLE
MOBILITY SCORE: 16
TOILETING: TOTAL
CLIMB 3 TO 5 STEPS WITH RAILING: A LOT
EATING MEALS: A LITTLE
TOILETING: A LITTLE
DRESSING REGULAR UPPER BODY CLOTHING: A LITTLE
DRESSING REGULAR LOWER BODY CLOTHING: A LITTLE

## 2024-08-08 ASSESSMENT — LIFESTYLE VARIABLES
AUDIT-C TOTAL SCORE: 0
HOW OFTEN DO YOU HAVE A DRINK CONTAINING ALCOHOL: NEVER
AUDIT-C TOTAL SCORE: 0
PRESCIPTION_ABUSE_PAST_12_MONTHS: NO
HOW OFTEN DO YOU HAVE 6 OR MORE DRINKS ON ONE OCCASION: NEVER
SUBSTANCE_ABUSE_PAST_12_MONTHS: NO
HOW MANY STANDARD DRINKS CONTAINING ALCOHOL DO YOU HAVE ON A TYPICAL DAY: PATIENT DOES NOT DRINK
SKIP TO QUESTIONS 9-10: 1

## 2024-08-08 ASSESSMENT — ACTIVITIES OF DAILY LIVING (ADL)
PATIENT'S MEMORY ADEQUATE TO SAFELY COMPLETE DAILY ACTIVITIES?: NO
DRESSING YOURSELF: NEEDS ASSISTANCE
BATHING_ASSISTANCE: MINIMAL
LACK_OF_TRANSPORTATION: NO
GROOMING: NEEDS ASSISTANCE
ASSISTIVE_DEVICE: WALKER
BATHING_ASSISTANCE: MINIMAL
JUDGMENT_ADEQUATE_SAFELY_COMPLETE_DAILY_ACTIVITIES: NO
TOILETING: NEEDS ASSISTANCE
BATHING: NEEDS ASSISTANCE
HEARING - RIGHT EAR: FUNCTIONAL
ADL_ASSISTANCE: NEEDS ASSISTANCE
FEEDING YOURSELF: INDEPENDENT
HEARING - LEFT EAR: FUNCTIONAL
WALKS IN HOME: NEEDS ASSISTANCE
ADEQUATE_TO_COMPLETE_ADL: YES

## 2024-08-08 ASSESSMENT — ENCOUNTER SYMPTOMS
VOMITING: 0
DIFFICULTY URINATING: 0
NAUSEA: 0
DYSURIA: 0
CONFUSION: 1
DIARRHEA: 0
FEVER: 0
ABDOMINAL PAIN: 0
SHORTNESS OF BREATH: 1
CHILLS: 1
COUGH: 1
WEAKNESS: 1

## 2024-08-08 ASSESSMENT — PAIN - FUNCTIONAL ASSESSMENT
PAIN_FUNCTIONAL_ASSESSMENT: 0-10

## 2024-08-08 ASSESSMENT — PATIENT HEALTH QUESTIONNAIRE - PHQ9
1. LITTLE INTEREST OR PLEASURE IN DOING THINGS: NOT AT ALL
SUM OF ALL RESPONSES TO PHQ9 QUESTIONS 1 & 2: 0
2. FEELING DOWN, DEPRESSED OR HOPELESS: NOT AT ALL

## 2024-08-08 ASSESSMENT — PAIN SCALES - GENERAL
PAINLEVEL_OUTOF10: 0 - NO PAIN

## 2024-08-08 ASSESSMENT — COLUMBIA-SUICIDE SEVERITY RATING SCALE - C-SSRS
1. IN THE PAST MONTH, HAVE YOU WISHED YOU WERE DEAD OR WISHED YOU COULD GO TO SLEEP AND NOT WAKE UP?: NO
6. HAVE YOU EVER DONE ANYTHING, STARTED TO DO ANYTHING, OR PREPARED TO DO ANYTHING TO END YOUR LIFE?: NO
2. HAVE YOU ACTUALLY HAD ANY THOUGHTS OF KILLING YOURSELF?: NO

## 2024-08-08 NOTE — PROGRESS NOTES
08/08/24 1203   Discharge Planning   Living Arrangements Alone   Support Systems Children;Family members;Other (Comment)   Assistance Needed Patient has 24/7  care whether it is family or private pay caregivers;  Patient receives assistance and supportive care.  He is on room air at baseline.He ambulates either with a cane or walker and has DME:  walk in shower with chair and grab bar; raised toilet;   Type of Residence Private residence  (2 story house but utilizes the main floor)   Number of Stairs to Enter Residence 1   Number of Stairs Within Residence 12   Do you have animals or pets at home? No   Home or Post Acute Services In home services   Type of Home Care Services DME or oxygen   Expected Discharge Disposition  Services   Does the patient need discharge transport arranged? No   Financial Resource Strain   How hard is it for you to pay for the very basics like food, housing, medical care, and heating? Not hard   Housing Stability   In the last 12 months, was there a time when you were not able to pay the mortgage or rent on time? N   In the past 12 months, how many times have you moved where you were living? 0   At any time in the past 12 months, were you homeless or living in a shelter (including now)? N   Transportation Needs   In the past 12 months, has lack of transportation kept you from medical appointments or from getting medications? no   In the past 12 months, has lack of transportation kept you from meetings, work, or from getting things needed for daily living? No     TCC spoke with patient's son Ronnie regarding discharge planning and home going needs. Patient lives  alone but has 24/7 care.  Confirmed with patient PCP is  Dr. Toya Dickey and pharmacy is [facility] in [place].  Ronnie is considering home care vs hospice if appropriate.  He just recently completed services with [facility].  TCC will continue to follow for changes in discharge planning needs.

## 2024-08-08 NOTE — PROGRESS NOTES
Occupational Therapy    Evaluation    Patient Name: Blu Grigsby  MRN: [id number]  Today's Date: 8/8/2024  Time Calculation  Start Time: 1301  Stop Time: 1315  Time Calculation (min): 14 min    Assessment  IP OT Assessment  OT Assessment: Pt. is a 84 y.o. male referred to occupational therapy for imapried self-care 2/2 admisison for community acquired pneumonia. Pt. displays increased fatigue, decreased endurance, balance, transfers, ambulation, and self-care and would benefit from skilled OT to address.  Pt. would benefit from skilled OT at MOD intensity to address above deficits and return to PLOF in LRE.  Prognosis: Good  Barriers to Discharge: None  Evaluation/Treatment Tolerance: Patient tolerated treatment well  Medical Staff Made Aware: Yes  End of Session Communication: Bedside nurse  End of Session Patient Position: Bed, 2 rail up, Alarm on  Plan:  Treatment Interventions: ADL retraining, Functional transfer training, Endurance training, Patient/family training, Neuromuscular reeducation, Equipment evaluation/education, Compensatory technique education  OT Frequency: 3 times per week  OT Discharge Recommendations: Low intensity level of continued care, Moderate intensity level of continued care, 24 hr supervision due to cognition  OT Recommended Transfer Status: Minimal assist, Assist of 1  OT - OK to Discharge: Yes (Based on completed evaluation and care plan recommendations, no barriers to discharge to next site of care)    Subjective   Current Problem:  1. Altered mental status, unspecified altered mental status type        2. Pneumonia due to infectious organism, unspecified laterality, unspecified part of lung        3. Hypoxia          General:  General  Reason for Referral: impaired self-care d/t admission for pneumonia  Referred By: AMINAH Sanderson  Past Medical History Relevant to Rehab: AAA, CHF, dementia, HLD, HTN  Family/Caregiver Present: Yes (LAURENT Aguilera)  Prior to Session Communication:  Bedside nurse  Patient Position Received: Bed, 2 rail up, Alarm on  General Comment: patience richmond, AP,  Precautions:  Medical Precautions: Fall precautions  Pain:  Pain Assessment  Pain Assessment: 0-10  0-10 (Numeric) Pain Score: 0 - No pain    Objective   Cognition:  Overall Cognitive Status: Impaired at baseline  Arousal/Alertness: Delayed responses to stimuli (lethargic)  Orientation Level: Disoriented to place, Disoriented to situation  Home Living:  Type of Home: House  Lives With: Alone (24 hr care provided from family and caregivers)  Home Adaptive Equipment: Walker rolling or standard, Cane  Home Layout: Two level  Home Access: Stairs to enter without rails  Entrance Stairs-Number of Steps: 1  Bathroom Shower/Tub: Walk-in shower  Bathroom Toilet: Handicapped height, Adaptive toilet seating  Bathroom Equipment: Grab bars in shower, Shower chair with back, Grab bars around toilet (riser with handles)   Prior Function:  Level of [place]: Needs assistance with ADLs, Needs assistance with homemaking, Needs assistance with functional transfers  Receives Help From: Family, Personal care attendant  ADL Assistance: Needs assistance (per family pt. requires cueing to complete ADLS)  Bath: Minimal (family assists with backside)  Toileting:  (IND)  Dressing:  (IND; needs help some days)  Grooming:  (IND)  Feeding:  (IND)  Homemaking Assistance: Needs assistance (family/caregivers complete)  Ambulatory Assistance: Needs assistance (SBA with walker, pt. uses cane in home and furniture walks short distances)  Prior Function Comments: family states d/t dementia would like to take him home with home care  IADL History:  Homemaking Responsibilities: No  ADL:  Eating Assistance: Stand by (anticipated)  Grooming Assistance: Stand by (anticipated)  Bathing Assistance: Minimal (anticiapted)  UE Dressing Assistance: Minimal (anticipated)  LE Dressing Assistance: Stand by  Toileting Assistance with Device: Total  Activity  Tolerance:  Endurance: Decreased tolerance for upright activites  Bed Mobility/Transfers: Bed Mobility  Bed Mobility: Yes  Bed Mobility 1  Bed Mobility 1: Supine to sitting  Level of Assistance 1: Minimum assistance  Bed Mobility Comments 1: assistance with guiding and cueing  Bed Mobility 2  Bed Mobility  2: Sitting to supine  Level of Assistance 2: Close supervision    Transfers  Transfer: Yes  Transfer 1  Transfer From 1: Bed to  Transfer to 1: Sit, Stand  Technique 1: Sit to stand, Stand to sit  Transfer Device 1: Walker, Gait belt  Transfer Level of Assistance 1: Contact guard, Minimum assistance      Functional Mobility:  Functional Mobility  Functional Mobility Performed: No (pt. dropping head and attempting to sit down, d/t lethargy not safe for ambulation at this time.)  Sitting Balance:  Static Sitting Balance  Static Sitting-Balance Support: Feet supported  Static Sitting-Level of Assistance: Contact guard  Dynamic Sitting Balance  Dynamic Sitting-Balance Support: Feet supported  Dynamic Sitting-Balance: Forward lean  Dynamic Sitting-Comments: CGA  Standing Balance:  Static Standing Balance  Static Standing-Balance Support: Bilateral upper extremity supported  Static Standing-Level of Assistance: Contact guard  IADL's:   Homemaking Responsibilities: No  Sensation:  Sensation Comment: denies deficits  Strength:  Strength Comments: BUE: 4/5 grossly throughout  Coordination:  Movements are Fluid and Coordinated: Yes   Hand Function:  Hand Function  Gross Grasp: Functional  Coordination: Functional  Extremities: RUE   RUE : Within Functional Limits and LUE   LUE: Within Functional Limits    Outcome Measures: [facility] Daily Activity  Putting on and taking off regular lower body clothing: A little  Bathing (including washing, rinsing, drying): A little  Putting on and taking off regular upper body clothing: A little  Toileting, which includes using toilet, bedpan or urinal: Total  Taking care of personal grooming  such as brushing teeth: A little  Eating Meals: A little  Daily Activity - Total Score: 16      Education Documentation  Body Mechanics, taught by Rica Jacobsen OT at 8/8/2024  4:53 PM.  Learner: Family, Patient  Readiness: Acceptance  Method: Explanation  Response: Needs Reinforcement  Comment: edu on POC and DC rec. Edu on hand placement and safety with transfers.    ADL Training, taught by Rica Jacobsen OT at 8/8/2024  4:53 PM.  Learner: Family, Patient  Readiness: Acceptance  Method: Explanation  Response: Needs Reinforcement  Comment: edu on POC and DC rec. Edu on hand placement and safety with transfers.    Education Comments  No comments found.      Goals:   Encounter Problems       Encounter Problems (Active)       ADLs       Patient with complete lower body dressing with modified independent level of assistance donning and doffing all LE clothes  with PRN adaptive equipment while supported sitting       Start:  08/08/24    Expected End:  08/22/24            Patient will complete daily grooming tasks brushing teeth and washing face/hair with set-up and supervision level of assistance and PRN adaptive equipment while standing.       Start:  08/08/24    Expected End:  08/22/24            Patient will complete toileting including hygiene clothing management/hygiene with modified independent level of assistance.       Start:  08/08/24    Expected End:  08/22/24               MOBILITY       Patient will perform Functional mobility mod  Household distances/Community Distances with set-up and supervision level of assistance and least restrictive device in order to improve safety and functional mobility.       Start:  08/08/24    Expected End:  08/22/24               TRANSFERS       Patient will perform bed mobility modified independent level of assistance in order to improve safety and independence with mobility       Start:  08/08/24    Expected End:  08/22/24            Patient will complete sit to stand transfer  with set-up and supervision level of assistance and least restrictive device in order to improve safety and prepare for out of bed mobility.       Start:  08/08/24    Expected End:  08/22/24

## 2024-08-08 NOTE — DISCHARGE INSTR - OTHER ORDERS
Thank you for choosing [facility] for your Health Care needs.  Also, thank you for allowing us to take you and your families preferences into account when determining your discharge plan.  Stay well!    Your Care Transitions Team Member:Katherine Forrester Robin and Tanja [phone]    For questions about your medications listed on your discharge instructions, please call the Nurses Station at [phone].

## 2024-08-08 NOTE — H&P
History Of Present Illness  Blu Grigsby is a 84 y.o. male presenting with weakness, worsening confusion, and cough. Patient was brought in to the ED by EMS for hypoxia. Patient has a known history of dementia and is a poor historian; majority of history is from chart review and family at bedside. Patient lives at home with family with home health care several times a week. Patient has had a cough for about 5 days now; his PCP sent in azithromycin which he has been taking for 2 days without improvement. He has been more confused than his baseline per family. Patient reports having the chills. He does not wear any home O2 per family. EMS was called to the patient's home and he was found to be 82% on room air so he was brought to the ED. On arrival patient was on 2L with SpO2 94%. Patient remained afebrile. Labs were significant for WBC 12.3. COVID was negative. CT head did not show any acute process. CTA chest for PE was negative for large PE, did show mild to moderate emphysema with diffuse bronchial wall thickening suggestive of bronchitis and an area of consolidation in the left lower lobe.  Patient was given 1L LR, azithromycin, and ceftriaxone in the ED. He is admitted to med/surg for further treatment of pneumonia. Plan of care discussed with patient's son/POA, all questions answered.      Past Medical History  Past Medical History:   Diagnosis Date    Abdominal aortic aneurysm (AAA) (CMS-HCC)     CHF (congestive heart failure) (Multi)     Dementia (Multi)     HLD (hyperlipidemia)     HTN (hypertension)     Personal history of other diseases of the circulatory system 10/11/2017    History of abdominal aortic aneurysm (AAA)    Personal history of other diseases of the circulatory system     History of hypertension    Personal history of other diseases of the circulatory system     History of congestive heart failure       Surgical History  Past Surgical History:   Procedure Laterality Date    CARDIAC SURGERY   07/07/2017    Heart Surgery    CT ABDOMEN ANGIOGRAM W AND/OR WO IV CONTRAST  6/23/2014    CT ABDOMEN ANGIOGRAM W AND/OR WO IV CONTRAST 6/23/2014 GEN ANCILLARY LEGACY    CT ABDOMEN ANGIOGRAM W AND/OR WO IV CONTRAST  9/6/2017    CT ABDOMEN ANGIOGRAM W AND/OR WO IV CONTRAST 9/6/2017 [facility] ANCILLARY LEGACY    CT ABDOMEN PELVIS ANGIOGRAM W AND/OR WO IV CONTRAST  6/10/2013    CT ABDOMEN PELVIS ANGIOGRAM W AND/OR WO IV CONTRAST 6/10/2013 GEN ANCILLARY LEGACY    CT ABDOMEN PELVIS ANGIOGRAM W AND/OR WO IV CONTRAST  12/17/2012    CT ABDOMEN PELVIS ANGIOGRAM W AND/OR WO IV CONTRAST 12/17/2012 GEN ANCILLARY LEGACY    OTHER SURGICAL HISTORY  07/07/2017    Abdominal Surgery    OTHER SURGICAL HISTORY  08/28/2017    Endovascular Repair Of Abdominal Aorta Aneurysm        Social History  He reports that he quit smoking about 26 years ago. His smoking use included cigarettes. He started smoking about 61 years ago. He has a 35 pack-year smoking history. He has never used smokeless tobacco. He reports that he does not currently use alcohol. He reports that he does not use drugs.    Family History  No family history on file.     Allergies  Patient has no known allergies.    Review of Systems   Constitutional:  Positive for chills. Negative for fever.   Respiratory:  Positive for cough and shortness of breath.    Cardiovascular:  Negative for chest pain and leg swelling.   Gastrointestinal:  Negative for abdominal pain, diarrhea, nausea and vomiting.   Genitourinary:  Negative for difficulty urinating and dysuria.   Neurological:  Positive for weakness (generalized).   Psychiatric/Behavioral:  Positive for confusion.    All other systems reviewed and are negative.       Physical Exam  Constitutional:       General: He is not in acute distress.     Appearance: He is obese. He is ill-appearing.   HENT:      Head: Normocephalic and atraumatic.      Mouth/Throat:      Mouth: Mucous membranes are dry.   Eyes:      Conjunctiva/sclera:  Conjunctivae normal.   Cardiovascular:      Rate and Rhythm: Normal rate and regular rhythm.   Pulmonary:      Effort: No respiratory distress.      Breath sounds: Wheezing and rhonchi present.      Comments: On 2L O2 via NC   Abdominal:      General: There is no distension.      Palpations: Abdomen is soft.      Tenderness: There is no abdominal tenderness.   Musculoskeletal:      Right lower leg: No edema.      Left lower leg: No edema.   Skin:     General: Skin is warm and dry.   Neurological:      Mental Status: He is alert. He is disoriented.          Last Recorded Vitals  Blood pressure (!) 156/93, pulse 85, temperature 37.1 °C (98.8 °F), temperature source Temporal, resp. rate 16, height 1.524 m (5'), weight 84.8 kg (186 lb 15.2 oz), SpO2 92%.    Relevant Results      Scheduled medications  aspirin, 81 mg, oral, Daily  carvedilol, 3.125 mg, oral, BID  [START ON 8/9/2024] cefTRIAXone, 1 g, intravenous, q24h  divalproex, 125 mg, oral, Nightly  donepezil, 10 mg, oral, Daily  doxycycline, 100 mg, intravenous, q12h  enoxaparin, 40 mg, subcutaneous, q24h  furosemide, 40 mg, oral, BID  ipratropium-albuteroL, 3 mL, nebulization, TID  lisinopril, 20 mg, oral, Daily  memantine, 10 mg, oral, BID  methylPREDNISolone sodium succinate (PF), 40 mg, intravenous, q8h  mirtazapine, 15 mg, oral, Nightly  [START ON 8/9/2024] pantoprazole, 40 mg, oral, Daily before breakfast   Or  [START ON 8/9/2024] pantoprazole, 40 mg, intravenous, Daily before breakfast  polyethylene glycol, 17 g, oral, Daily      Continuous medications     PRN medications  PRN medications: acetaminophen **OR** acetaminophen **OR** acetaminophen, acetaminophen **OR** acetaminophen **OR** acetaminophen, benzocaine-menthol, dextromethorphan-guaifenesin, guaiFENesin, melatonin, ondansetron ODT **OR** ondansetron    Results for orders placed or performed during the hospital encounter of 08/08/24 (from the past 24 hour(s))   CBC and Auto Differential   Result Value  Ref Range    WBC 12.3 (H) 4.4 - 11.3 x10*3/uL    nRBC 0.0 0.0 - 0.0 /100 WBCs    RBC 4.68 4.50 - 5.90 x10*6/uL    Hemoglobin 13.4 (L) 13.5 - 17.5 g/dL    Hematocrit 43.4 41.0 - 52.0 %    MCV 93 80 - 100 fL    MCH 28.6 26.0 - 34.0 pg    MCHC 30.9 (L) 32.0 - 36.0 g/dL    RDW 16.8 (H) 11.5 - 14.5 %    Platelets 220 150 - 450 x10*3/uL    Neutrophils % 83.8 40.0 - 80.0 %    Immature Granulocytes %, Automated 0.5 0.0 - 0.9 %    Lymphocytes % 5.9 13.0 - 44.0 %    Monocytes % 9.2 2.0 - 10.0 %    Eosinophils % 0.2 0.0 - 6.0 %    Basophils % 0.4 0.0 - 2.0 %    Neutrophils Absolute 10.29 (H) 1.60 - 5.50 x10*3/uL    Immature Granulocytes Absolute, Automated 0.06 0.00 - 0.50 x10*3/uL    Lymphocytes Absolute 0.72 (L) 0.80 - 3.00 x10*3/uL    Monocytes Absolute 1.13 (H) 0.05 - 0.80 x10*3/uL    Eosinophils Absolute 0.02 0.00 - 0.40 x10*3/uL    Basophils Absolute 0.05 0.00 - 0.10 x10*3/uL   Comprehensive Metabolic Panel   Result Value Ref Range    Glucose 148 (H) 74 - 99 mg/dL    Sodium 142 136 - 145 mmol/L    Potassium 3.8 3.5 - 5.3 mmol/L    Chloride 104 98 - 107 mmol/L    Bicarbonate 32 21 - 32 mmol/L    Anion Gap 10 10 - 20 mmol/L    Urea Nitrogen 23 6 - 23 mg/dL    Creatinine 1.13 0.50 - 1.30 mg/dL    eGFR 64 >60 mL/min/1.73m*2    Calcium 9.0 8.6 - 10.3 mg/dL    Albumin 3.6 3.4 - 5.0 g/dL    Alkaline Phosphatase 83 33 - 136 U/L    Total Protein 7.1 6.4 - 8.2 g/dL    AST 24 9 - 39 U/L    Bilirubin, Total 1.0 0.0 - 1.2 mg/dL    ALT 22 10 - 52 U/L   Lactate   Result Value Ref Range    Lactate 1.0 0.4 - 2.0 mmol/L   Sars-CoV-2 PCR   Result Value Ref Range    Coronavirus 2019, PCR Not Detected Not Detected   ECG 12 lead   Result Value Ref Range    Ventricular Rate 89 BPM    Atrial Rate 89 BPM    AL Interval 170 ms    QRS Duration 142 ms    QT Interval 446 ms    QTC Calculation(Bazett) 542 ms    P Axis 34 degrees    R Axis -38 degrees    T Axis 133 degrees    QRS Count 14 beats    Q Onset 205 ms    P Onset 120 ms    P Offset 190 ms     T Offset 428 ms    QTC Fredericia 508 ms       CT angio chest for pulmonary embolism    Result Date: 8/8/2024  STUDY: CT Angiogram of the Chest; 8/8/2024 10:30 AM INDICATION: Shortness of breath with cough. COMPARISON: 1/27/2023 XR chest. ACCESSION NUMBER(S): [id number] ORDERING CLINICIAN: TR MONCADA TECHNIQUE:  CTA of the chest was performed with intravenous contrast. Images are reviewed and processed at a workstation according to the CT angiogram protocol with 3-D and/or MIP post processing imaging generated.  Omnipaque 350 75 mL was administered intravenously. Automated mA/kV exposure control was utilized and patient examination was performed in strict accordance with principles of ALARA. FINDINGS: Pulmonary arteries are slightly suboptimally opacified without acute or chronic filling defects.  The thoracic aorta is normal in course and caliber without dissection or aneurysm. Heart is enlarged with severe coronary artery calcifications..  Mildly enlarged subcarinal node measuring 1.9 cm presumably reactive. Probable trace left pleural effusion.  Mild to moderate emphysema.  Diffuse bronchial wall thickening.  There is some associated mucous plugging with small superimposed patchy areas of airspace consolidation in the lower lobes left greater than right.  Moderate motion artifact. Upper abdomen demonstrates no acute pathology. There are no acute fractures.  No suspicious bony lesions.    1. Moderate motion artifact.  No large pulmonary embolism. 2. Diffuse bronchial wall thickening suggestive of bronchitis. There is some associated mucous plugging with small superimposed patchy areas of airspace consolidation in the lower lobes left greater than right.  Correlate clinically for pneumonia. 3. Mild to moderate emphysema. 4. Cardiomegaly with severe coronary artery calcifications. Signed by Jus Valentine MD    CT head wo IV contrast    Result Date: 8/8/2024  Interpreted By:  Gabriela Baez, STUDY: CT HEAD WO  IV CONTRAST;  8/8/2024 10:18 am   INDICATION: Signs/Symptoms:AMS, cough.   COMPARISON: None   ACCESSION NUMBER(S): [id number]   ORDERING CLINICIAN: TR MONCADA   TECHNIQUE: Examination was performed in the axial plane using soft tissue and bone algorithm.   FINDINGS: INTRACRANIAL: There is prominence of the ventricular system and cerebral sulci consistent with cerebral atrophy. There are periventricular hypodensities consistent with  moderate small vessel disease. No mass or mass effect is identified. There is no hemorrhage or subdural fluid collection. There is no acute infarct. 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   EXTRACRANIAL: There is mild mucosal thickening of ethmoid air cells.       No acute intracranial pathology.   MACRO: None   Signed by: Gabriela Baez 8/8/2024 10:51 AM Dictation workstation:   [id number]    ECG 12 lead    Result Date: 8/8/2024  Sinus rhythm with occasional Premature ventricular complexes and Premature atrial complexes Left axis deviation Left bundle branch block Abnormal ECG When compared with ECG of 07-SEP-2023 15:51, Premature ventricular complexes are now Present Premature atrial complexes are now Present QRS axis Shifted left T wave inversion no longer evident in Inferior leads          Assessment/Plan   Principal Problem:    Community acquired bacterial pneumonia  Active Problems:    CAD (coronary artery disease)    Severe dementia, unspecified dementia type, unspecified whether behavioral, psychotic, or mood disturbance or anxiety (Multi)    Hypertension, essential    Pure hypercholesterolemia    Stage C chronic combined congestive heart failure (Multi)    Hypoxia      #Acute hypoxic respiratory failure 2/2 pneumonia, community acquired  - Patient brought in by family for cough, worsening confusion/weakness  - SpO2 82% on RA on EMS arrival   - WBC 12.3   - Lactate 1.0   - COVID negative   - Procal, BCx pending   - Sputum culture  if able   - CTA chest for PE: Moderate motion artifact.  No large pulmonary embolism. 2. Diffuse bronchial wall thickening suggestive of bronchitis. There is some associated mucous plugging with small superimposed patchy areas of airspace consolidation in the lower lobes left greater than   right.  Correlate clinically for pneumonia. 3. Mild to moderate emphysema.    - Received azithromycin and ceftriaxone in the ED   - ATBs changed to doxycycline/ceftriaxone- patient on donepezil   - Start Solumedrol 40 mg IVP q8h   - DuoNebs TID  - Mucinex BID  - Tylenol PRN for fever   - RT eval and treat protocol  - Bronchial hygiene  - Baseline O2 None   - Wean O2 as tolerated; patient currently on 2L O2 via NC     #Dementia, unspecified type  - At home with 24/7 care and HHC   - Continue divalproex, donepezil, memantine, mirtazapine  - Delirium precautions: Frequent reorientation, day/night normalization, shades open for sunlight, provide glasses/hearing aids as needed  - PT/OT evals pending   - TCC on for discharge planning    #HTN  #HLD  #CAD  #HFmrEF, not in acute exacerbation   - CTA chest for PE: 4. Cardiomegaly with severe coronary artery calcifications.   - Echo from 2021 with LEF 45-50%, abnormal pattern of LVDF   - Continue home aspirin, carvedilol, furosemide, lisinopril   - 1800 mL daily fluid restriction, cardiac diet  - Continuous telemetry monitoring   - Monitor volume status; does not appear volume overloaded on exam      DVT PPx: Lovenox   GI PPx: Protonix   Diet: Regular   Code Status: Full     Disposition: Patient requires inpatient management at this time.       Jyoti Sanchez PA-C  Attending Attestation:    I was present with the APRADRIAN-CNP who participated in the documentation of this note. I have personally seen and re-examined the patient and performed the medical decision-making components (assessment and plan of care). I have reviewed the documentation and verified the findings in the note as written  with additions or exceptions as stated in the body of this note.    Dr. Flory Corral MD  Internal Medicine

## 2024-08-08 NOTE — ED PROVIDER NOTES
HPI   Chief Complaint   Patient presents with    Altered Mental Status     Pt to ED from home via EMS. Per pt caregiver, pt has been more confused than usual, does have hx dementia. Pt started having a cough Sunday, daughter called dr charles and they prescribed azithromycin. Pt has been taking it since Tuesday night. Pt also weaker than usual and was hypoxic, 82% RA. Pt now 94% on 2L from EMS. Per caregiver, pt has also had darker/orangish urine       HPI        Patient History   Past Medical History:   Diagnosis Date    Personal history of other diseases of the circulatory system 10/11/2017    History of abdominal aortic aneurysm (AAA)    Personal history of other diseases of the circulatory system     History of hypertension    Personal history of other diseases of the circulatory system     History of congestive heart failure     Past Surgical History:   Procedure Laterality Date    CARDIAC SURGERY  07/07/2017    Heart Surgery    CT ABDOMEN ANGIOGRAM W AND/OR WO IV CONTRAST  6/23/2014    CT ABDOMEN ANGIOGRAM W AND/OR WO IV CONTRAST 6/23/2014 [facility] ANCILLARY LEGACY    CT ABDOMEN ANGIOGRAM W AND/OR WO IV CONTRAST  9/6/2017    CT ABDOMEN ANGIOGRAM W AND/OR WO IV CONTRAST 9/6/2017 [facility] ANCILLARY LEGACY    CT ABDOMEN PELVIS ANGIOGRAM W AND/OR WO IV CONTRAST  6/10/2013    CT ABDOMEN PELVIS ANGIOGRAM W AND/OR WO IV CONTRAST 6/10/2013 [facility] ANCILLARY LEGACY    CT ABDOMEN PELVIS ANGIOGRAM W AND/OR WO IV CONTRAST  12/17/2012    CT ABDOMEN PELVIS ANGIOGRAM W AND/OR WO IV CONTRAST 12/17/2012 [facility] ANCILLARY LEGACY    OTHER SURGICAL HISTORY  07/07/2017    Abdominal Surgery    OTHER SURGICAL HISTORY  08/28/2017    Endovascular Repair Of Abdominal Aorta Aneurysm     No family history on file.  Social History     Tobacco Use    Smoking status: Former     Current packs/day: 0.00     Average packs/day: 1 pack/day for 35.0 years (35.0 ttl pk-yrs)     Types: Cigarettes     Start date: 1963     Quit date: 1998     Years since quitting:  26.6    Smokeless tobacco: Never   Vaping Use    Vaping status: Never Used   Substance Use Topics    Alcohol use: Not Currently    Drug use: Never       Physical Exam   ED Triage Vitals [08/08/24 0912]   Temperature Heart Rate Respirations BP   36.6 °C (97.8 °F) 88 18 124/84      Pulse Ox Temp Source Heart Rate Source Patient Position   (!) 92 % Temporal -- --      BP Location FiO2 (%)     -- --       Physical Exam  Vitals and nursing note reviewed.   Constitutional:       General: He is not in acute distress.     Appearance: He is well-developed.   HENT:      Head: Normocephalic and atraumatic.   Eyes:      Conjunctiva/sclera: Conjunctivae normal.   Cardiovascular:      Rate and Rhythm: Normal rate and regular rhythm.      Heart sounds: No murmur heard.  Pulmonary:      Effort: Pulmonary effort is normal. No respiratory distress.      Breath sounds: Rhonchi present.   Abdominal:      Palpations: Abdomen is soft.      Tenderness: There is no abdominal tenderness.   Musculoskeletal:         General: No swelling.      Cervical back: Neck supple.   Skin:     General: Skin is warm and dry.      Capillary Refill: Capillary refill takes less than 2 seconds.   Neurological:      Mental Status: He is alert.   Psychiatric:         Mood and Affect: Mood normal.           ED Course & MDM   ED Course as of 08/08/24 1104   Thu Aug 08, 2024   0914 EKG performed at 914 showing normal sinus rhythm ventricular rate of 89 with a left bundle branch block.  No ST elevation with PVCs interpreted by me [KA]      ED Course User Index  [KA] Rolly Pal, DO         Diagnoses as of 08/08/24 1104   Altered mental status, unspecified altered mental status type   Pneumonia due to infectious organism, unspecified laterality, unspecified part of lung   Hypoxia                 No data recorded     Rylee Coma Scale Score: 13 (08/08/24 0956 : Moira Titus, MARTÍNEZ)                           Medical Decision Making  84-year-old gentleman presents  to the ER with chief complaint of shortness of breath cough increased Ultibro status generalized weakness.  Patient is not normally on oxygen he is currently on 2 L.  Patient in the ED for evaluation.  Patient had workup including imaging and blood work patient is from home patient does have mild dementia at baseline.  Patient had a CAT scan that shows pneumonia.  Will start the patient on antibiotics.  Patient was started on antibiotics prior to arrival by his PCP.  Again prehospital patient was 82% on room air patient is currently on 2 L.  Patient will be admitted to the hospital for further evaluation treatment.        Procedure  Procedures     Rolly Pal, DO  08/08/24 1104

## 2024-08-08 NOTE — ED TRIAGE NOTES
Pt to ED from home via EMS. Per pt caregiver, pt has been more confused than usual, does have hx dementia. Pt started having a cough Sunday, daughter called dr charles and they prescribed azithromycin. Pt has been taking it since Tuesday night. Pt also weaker than usual and was hypoxic, 82% RA. Pt now 94% on 2L from EMS. Per caregiver, pt has also had darker/orangish urine

## 2024-08-09 PROBLEM — R41.82 ALTERED MENTAL STATUS, UNSPECIFIED ALTERED MENTAL STATUS TYPE: Status: ACTIVE | Noted: 2024-08-09

## 2024-08-09 LAB
ANION GAP SERPL CALC-SCNC: 12 MMOL/L (ref 10–20)
ATRIAL RATE: 89 BPM
BUN SERPL-MCNC: 23 MG/DL (ref 6–23)
CALCIUM SERPL-MCNC: 9 MG/DL (ref 8.6–10.3)
CHLORIDE SERPL-SCNC: 103 MMOL/L (ref 98–107)
CO2 SERPL-SCNC: 31 MMOL/L (ref 21–32)
CREAT SERPL-MCNC: 0.95 MG/DL (ref 0.5–1.3)
EGFRCR SERPLBLD CKD-EPI 2021: 79 ML/MIN/1.73M*2
ERYTHROCYTE [DISTWIDTH] IN BLOOD BY AUTOMATED COUNT: 16.3 % (ref 11.5–14.5)
GLUCOSE SERPL-MCNC: 176 MG/DL (ref 74–99)
HCT VFR BLD AUTO: 42.3 % (ref 41–52)
HGB BLD-MCNC: 13 G/DL (ref 13.5–17.5)
HOLD SPECIMEN: NORMAL
HOLD SPECIMEN: NORMAL
MCH RBC QN AUTO: 29.1 PG (ref 26–34)
MCHC RBC AUTO-ENTMCNC: 30.7 G/DL (ref 32–36)
MCV RBC AUTO: 95 FL (ref 80–100)
NRBC BLD-RTO: 0 /100 WBCS (ref 0–0)
P AXIS: 34 DEGREES
P OFFSET: 190 MS
P ONSET: 120 MS
PLATELET # BLD AUTO: 211 X10*3/UL (ref 150–450)
POTASSIUM SERPL-SCNC: 4 MMOL/L (ref 3.5–5.3)
PR INTERVAL: 170 MS
PROCALCITONIN SERPL-MCNC: 0.15 NG/ML
Q ONSET: 205 MS
QRS COUNT: 14 BEATS
QRS DURATION: 142 MS
QT INTERVAL: 446 MS
QTC CALCULATION(BAZETT): 542 MS
QTC FREDERICIA: 508 MS
R AXIS: -38 DEGREES
RBC # BLD AUTO: 4.46 X10*6/UL (ref 4.5–5.9)
SODIUM SERPL-SCNC: 142 MMOL/L (ref 136–145)
T AXIS: 133 DEGREES
T OFFSET: 428 MS
VENTRICULAR RATE: 89 BPM
WBC # BLD AUTO: 9.8 X10*3/UL (ref 4.4–11.3)

## 2024-08-09 PROCEDURE — 2500000005 HC RX 250 GENERAL PHARMACY W/O HCPCS: Mod: IPSPLIT

## 2024-08-09 PROCEDURE — 85027 COMPLETE CBC AUTOMATED: CPT

## 2024-08-09 PROCEDURE — 94668 MNPJ CHEST WALL SBSQ: CPT

## 2024-08-09 PROCEDURE — 2500000004 HC RX 250 GENERAL PHARMACY W/ HCPCS (ALT 636 FOR OP/ED): Mod: IPSPLIT

## 2024-08-09 PROCEDURE — 2500000001 HC RX 250 WO HCPCS SELF ADMINISTERED DRUGS (ALT 637 FOR MEDICARE OP): Mod: IPSPLIT

## 2024-08-09 PROCEDURE — 80048 BASIC METABOLIC PNL TOTAL CA: CPT

## 2024-08-09 PROCEDURE — 94760 N-INVAS EAR/PLS OXIMETRY 1: CPT

## 2024-08-09 PROCEDURE — 36415 COLL VENOUS BLD VENIPUNCTURE: CPT

## 2024-08-09 PROCEDURE — 99233 SBSQ HOSP IP/OBS HIGH 50: CPT

## 2024-08-09 PROCEDURE — 1200000002 HC GENERAL ROOM WITH TELEMETRY DAILY: Mod: IPSPLIT

## 2024-08-09 PROCEDURE — 97161 PT EVAL LOW COMPLEX 20 MIN: CPT | Mod: GP,IPSPLIT | Performed by: PHYSICAL THERAPIST

## 2024-08-09 RX ADMIN — MEMANTINE 10 MG: 5 TABLET ORAL at 21:59

## 2024-08-09 RX ADMIN — DOXYCYCLINE 100 MG: 100 INJECTION, POWDER, LYOPHILIZED, FOR SOLUTION INTRAVENOUS at 01:32

## 2024-08-09 RX ADMIN — DIVALPROEX SODIUM 125 MG: 125 TABLET, DELAYED RELEASE ORAL at 12:14

## 2024-08-09 RX ADMIN — FUROSEMIDE 40 MG: 40 TABLET ORAL at 09:59

## 2024-08-09 RX ADMIN — MEMANTINE 10 MG: 5 TABLET ORAL at 09:58

## 2024-08-09 RX ADMIN — Medication 2 L/MIN: at 10:29

## 2024-08-09 RX ADMIN — FUROSEMIDE 40 MG: 40 TABLET ORAL at 17:31

## 2024-08-09 RX ADMIN — METHYLPREDNISOLONE SODIUM SUCCINATE 40 MG: 40 INJECTION, POWDER, FOR SOLUTION INTRAMUSCULAR; INTRAVENOUS at 04:44

## 2024-08-09 RX ADMIN — CARVEDILOL 3.12 MG: 3.12 TABLET, FILM COATED ORAL at 21:59

## 2024-08-09 RX ADMIN — POLYETHYLENE GLYCOL 3350 17 G: 17 POWDER, FOR SOLUTION ORAL at 09:59

## 2024-08-09 RX ADMIN — METHYLPREDNISOLONE SODIUM SUCCINATE 40 MG: 40 INJECTION, POWDER, FOR SOLUTION INTRAMUSCULAR; INTRAVENOUS at 18:48

## 2024-08-09 RX ADMIN — ASPIRIN 81 MG CHEWABLE TABLET 81 MG: 81 TABLET CHEWABLE at 09:58

## 2024-08-09 RX ADMIN — PANTOPRAZOLE SODIUM 40 MG: 40 TABLET, DELAYED RELEASE ORAL at 09:58

## 2024-08-09 RX ADMIN — CEFTRIAXONE 1 G: 1 INJECTION, SOLUTION INTRAVENOUS at 10:02

## 2024-08-09 RX ADMIN — ENOXAPARIN SODIUM 40 MG: 40 INJECTION SUBCUTANEOUS at 14:07

## 2024-08-09 RX ADMIN — METHYLPREDNISOLONE SODIUM SUCCINATE 40 MG: 40 INJECTION, POWDER, FOR SOLUTION INTRAMUSCULAR; INTRAVENOUS at 11:36

## 2024-08-09 RX ADMIN — MIRTAZAPINE 15 MG: 15 TABLET, FILM COATED ORAL at 21:59

## 2024-08-09 RX ADMIN — Medication 2 L/MIN: at 21:34

## 2024-08-09 RX ADMIN — LISINOPRIL 20 MG: 20 TABLET ORAL at 09:58

## 2024-08-09 RX ADMIN — DONEPEZIL HYDROCHLORIDE 10 MG: 5 TABLET ORAL at 09:57

## 2024-08-09 RX ADMIN — CARVEDILOL 3.12 MG: 3.12 TABLET, FILM COATED ORAL at 09:58

## 2024-08-09 RX ADMIN — DOXYCYCLINE 100 MG: 100 INJECTION, POWDER, LYOPHILIZED, FOR SOLUTION INTRAVENOUS at 13:59

## 2024-08-09 ASSESSMENT — COGNITIVE AND FUNCTIONAL STATUS - GENERAL
MOBILITY SCORE: 18
MOVING FROM LYING ON BACK TO SITTING ON SIDE OF FLAT BED WITH BEDRAILS: A LITTLE
WALKING IN HOSPITAL ROOM: A LITTLE
CLIMB 3 TO 5 STEPS WITH RAILING: A LITTLE
STANDING UP FROM CHAIR USING ARMS: A LITTLE
TURNING FROM BACK TO SIDE WHILE IN FLAT BAD: A LITTLE
MOVING TO AND FROM BED TO CHAIR: A LITTLE

## 2024-08-09 ASSESSMENT — PAIN SCALES - GENERAL
PAINLEVEL_OUTOF10: 0 - NO PAIN

## 2024-08-09 ASSESSMENT — PAIN - FUNCTIONAL ASSESSMENT
PAIN_FUNCTIONAL_ASSESSMENT: 0-10

## 2024-08-09 NOTE — PROGRESS NOTES
Blu Grigsby is a 84 y.o. male on day 0 of admission presenting with Community acquired bacterial pneumonia.    Subjective     No overnight events reported.     Patient resting in bed with caregiver at bedside. He is on 2L O2 via NC, says his breathing feels good today. He is on ceftriaxone and doxycycline for pneumonia. SLP is consulted to rule out aspiration; family member had reported to RN on admission that patient has had choking episodes when eating/drinking. He appears to be tolerating soft/bite-sized diet without cough. Plan of care discussed with patient and caregiver, all questions answered.         Objective     Physical Exam  Constitutional:       General: He is not in acute distress.     Appearance: He is obese. He is not toxic-appearing.   HENT:      Head: Normocephalic and atraumatic.      Mouth/Throat:      Mouth: Mucous membranes are moist.   Eyes:      Conjunctiva/sclera: Conjunctivae normal.   Cardiovascular:      Rate and Rhythm: Normal rate and regular rhythm.   Pulmonary:      Effort: No respiratory distress.      Breath sounds: Rales present. No wheezing.      Comments: On 2L O2 via NC  Abdominal:      General: There is no distension.      Palpations: Abdomen is soft.      Tenderness: There is no abdominal tenderness.   Musculoskeletal:      Right lower leg: No edema.      Left lower leg: No edema.   Skin:     General: Skin is warm and dry.   Neurological:      Mental Status: He is alert. Mental status is at baseline. He is disoriented.      Comments: A&O x 2    Psychiatric:         Mood and Affect: Mood normal.         Behavior: Behavior normal.         Last Recorded Vitals  Blood pressure 109/57, pulse 65, temperature 36.6 °C (97.9 °F), temperature source Temporal, resp. rate 16, height 1.524 m (5'), weight 84.8 kg (186 lb 15.2 oz), SpO2 98%.  Intake/Output last 3 Shifts:  I/O last 3 completed shifts:  In: 630 (7.4 mL/kg) [P.O.:400; I.V.:30 (0.4 mL/kg); IV Piggyback:200]  Out: - (0 mL/kg)    Weight: 84.8 kg     Relevant Results        Scheduled medications  aspirin, 81 mg, oral, Daily  carvedilol, 3.125 mg, oral, BID  cefTRIAXone, 1 g, intravenous, q24h  divalproex, 125 mg, oral, Nightly  donepezil, 10 mg, oral, Daily  doxycycline, 100 mg, intravenous, q12h  enoxaparin, 40 mg, subcutaneous, q24h  furosemide, 40 mg, oral, BID  lisinopril, 20 mg, oral, Daily  memantine, 10 mg, oral, BID  methylPREDNISolone sodium succinate (PF), 40 mg, intravenous, q8h  mirtazapine, 15 mg, oral, Nightly  pantoprazole, 40 mg, oral, Daily before breakfast   Or  pantoprazole, 40 mg, intravenous, Daily before breakfast  polyethylene glycol, 17 g, oral, Daily      Continuous medications  sodium chloride 0.9%, 10 mL/hr      PRN medications  PRN medications: acetaminophen **OR** acetaminophen **OR** acetaminophen, acetaminophen **OR** acetaminophen **OR** acetaminophen, albuterol, benzocaine-menthol, dextromethorphan-guaifenesin, guaiFENesin, melatonin, ondansetron ODT **OR** ondansetron, oxygen, sodium chloride 0.9%    Results for orders placed or performed during the hospital encounter of 08/08/24 (from the past 24 hour(s))   Respiratory Culture/Smear    Specimen: SPUTUM; Fluid   Result Value Ref Range    Gram Stain       Gram stain indicates specimen consists of lower respiratory tract secretions.    Gram Stain No predominant organism    Procalcitonin   Result Value Ref Range    Procalcitonin 0.15 (H) <=0.07 ng/mL   Urinalysis with Reflex Culture and Microscopic   Result Value Ref Range    Color, Urine Yellow Light-Yellow, Yellow, Dark-Yellow    Appearance, Urine Clear Clear    Specific Gravity, Urine >1.050 (N) 1.005 - 1.035    pH, Urine 6.5 5.0, 5.5, 6.0, 6.5, 7.0, 7.5, 8.0    Protein, Urine 30 (1+) (A) NEGATIVE, 10 (TRACE), 20 (TRACE) mg/dL    Glucose, Urine Normal Normal mg/dL    Blood, Urine 0.03 (TRACE) (A) NEGATIVE    Ketones, Urine NEGATIVE NEGATIVE mg/dL    Bilirubin, Urine NEGATIVE NEGATIVE    Urobilinogen, Urine  Normal Normal mg/dL    Nitrite, Urine NEGATIVE NEGATIVE    Leukocyte Esterase, Urine NEGATIVE NEGATIVE   Extra Urine Gray Tube   Result Value Ref Range    Extra Tube Hold for add-ons.    Urinalysis Microscopic   Result Value Ref Range    WBC, Urine 1-5 1-5, NONE /HPF    RBC, Urine 6-10 (A) NONE, 1-2, 3-5 /HPF    Squamous Epithelial Cells, Urine 1-9 (SPARSE) Reference range not established. /HPF    Mucus, Urine FEW Reference range not established. /LPF   CBC   Result Value Ref Range    WBC 9.8 4.4 - 11.3 x10*3/uL    nRBC 0.0 0.0 - 0.0 /100 WBCs    RBC 4.46 (L) 4.50 - 5.90 x10*6/uL    Hemoglobin 13.0 (L) 13.5 - 17.5 g/dL    Hematocrit 42.3 41.0 - 52.0 %    MCV 95 80 - 100 fL    MCH 29.1 26.0 - 34.0 pg    MCHC 30.7 (L) 32.0 - 36.0 g/dL    RDW 16.3 (H) 11.5 - 14.5 %    Platelets 211 150 - 450 x10*3/uL   Basic metabolic panel   Result Value Ref Range    Glucose 176 (H) 74 - 99 mg/dL    Sodium 142 136 - 145 mmol/L    Potassium 4.0 3.5 - 5.3 mmol/L    Chloride 103 98 - 107 mmol/L    Bicarbonate 31 21 - 32 mmol/L    Anion Gap 12 10 - 20 mmol/L    Urea Nitrogen 23 6 - 23 mg/dL    Creatinine 0.95 0.50 - 1.30 mg/dL    eGFR 79 >60 mL/min/1.73m*2    Calcium 9.0 8.6 - 10.3 mg/dL   SST TOP   Result Value Ref Range    Extra Tube Hold for add-ons.        CT angio chest for pulmonary embolism    Result Date: 8/8/2024  STUDY: CT Angiogram of the Chest; 8/8/2024 10:30 AM INDICATION: Shortness of breath with cough. COMPARISON: 1/27/2023 XR chest. ACCESSION NUMBER(S): [id number] ORDERING CLINICIAN: TR MONCADA TECHNIQUE:  CTA of the chest was performed with intravenous contrast. Images are reviewed and processed at a workstation according to the CT angiogram protocol with 3-D and/or MIP post processing imaging generated.  Omnipaque 350 75 mL was administered intravenously. Automated mA/kV exposure control was utilized and patient examination was performed in strict accordance with principles of ALARA. FINDINGS: Pulmonary arteries  are slightly suboptimally opacified without acute or chronic filling defects.  The thoracic aorta is normal in course and caliber without dissection or aneurysm. Heart is enlarged with severe coronary artery calcifications..  Mildly enlarged subcarinal node measuring 1.9 cm presumably reactive. Probable trace left pleural effusion.  Mild to moderate emphysema.  Diffuse bronchial wall thickening.  There is some associated mucous plugging with small superimposed patchy areas of airspace consolidation in the lower lobes left greater than right.  Moderate motion artifact. Upper abdomen demonstrates no acute pathology. There are no acute fractures.  No suspicious bony lesions.    1. Moderate motion artifact.  No large pulmonary embolism. 2. Diffuse bronchial wall thickening suggestive of bronchitis. There is some associated mucous plugging with small superimposed patchy areas of airspace consolidation in the lower lobes left greater than right.  Correlate clinically for pneumonia. 3. Mild to moderate emphysema. 4. Cardiomegaly with severe coronary artery calcifications. Signed by Jus Valentine MD    CT head wo IV contrast    Result Date: 8/8/2024  Interpreted By:  Gabriela Baez, STUDY: CT HEAD WO IV CONTRAST;  8/8/2024 10:18 am   INDICATION: Signs/Symptoms:AMS, cough.   COMPARISON: None   ACCESSION NUMBER(S): [id number]   ORDERING CLINICIAN: TR MONCADA   TECHNIQUE: Examination was performed in the axial plane using soft tissue and bone algorithm.   FINDINGS: INTRACRANIAL: There is prominence of the ventricular system and cerebral sulci consistent with cerebral atrophy. There are periventricular hypodensities consistent with  moderate small vessel disease. No mass or mass effect is identified. There is no hemorrhage or subdural fluid collection. There is no acute infarct. 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   EXTRACRANIAL: There is mild mucosal thickening of ethmoid  air cells.       No acute intracranial pathology.   MACRO: None   Signed by: Gabriela Baez 8/8/2024 10:51 AM Dictation workstation:   [id number]    ECG 12 lead    Result Date: 8/8/2024  Sinus rhythm with occasional Premature ventricular complexes and Premature atrial complexes Left axis deviation Left bundle branch block Abnormal ECG When compared with ECG of 07-SEP-2023 15:51, Premature ventricular complexes are now Present Premature atrial complexes are now Present QRS axis Shifted left T wave inversion no longer evident in Inferior leads            Assessment/Plan   Principal Problem:    Community acquired bacterial pneumonia  Active Problems:    CAD (coronary artery disease)    Severe dementia, unspecified dementia type, unspecified whether behavioral, psychotic, or mood disturbance or anxiety (Multi)    Hypertension, essential    Pure hypercholesterolemia    Stage C chronic combined congestive heart failure (Multi)    Hypoxia    Altered mental status, unspecified altered mental status type    #Acute hypoxic respiratory failure 2/2 pneumonia, community acquired  - Patient brought in by family for cough, worsening confusion/weakness  - SpO2 82% on RA on EMS arrival   - WBC 12.3 > 9.8   - Lactate 1.0   - COVID negative   - Procal 0.15  - BCx pending   - Sputum culture with no predominant organism  - CTA chest for PE: Moderate motion artifact.  No large pulmonary embolism. 2. Diffuse bronchial wall thickening suggestive of bronchitis. There is some associated mucous plugging with small superimposed patchy areas of airspace consolidation in the lower lobes left greater than   right.  Correlate clinically for pneumonia. 3. Mild to moderate emphysema.    - Received azithromycin and ceftriaxone in the ED   - ATBs changed to doxycycline/ceftriaxone- patient on donepezil   - Continue Solumedrol 40 mg IVP q8h   - DuoNebs TID  - Mucinex BID  - Tylenol PRN for fever   - RT eval and treat protocol  - Bronchial hygiene  -  Aspiration precautions ordered   - Baseline O2 None   - Wean O2 as tolerated; patient currently on 2L O2 via NC   - SLP consulted to rule out aspiration; patient currently on soft/bite-sized diet with no evidence of aspiration      #Dementia, unspecified type  - At home with 24/7 care and C   - Continue divalproex, donepezil, memantine, mirtazapine  - Delirium precautions: Frequent reorientation, day/night normalization, shades open for sunlight, provide glasses/hearing aids as needed  - PT/OT evals   - TCC on for discharge planning     #HTN  #HLD  #CAD  #HFmrEF, not in acute exacerbation  - BNP 1141 on admission, patient does not appear volume overloaded on exam   - CTA chest for PE: 4. Cardiomegaly with severe coronary artery calcifications.   - Echo from 2021 with LEF 45-50%, abnormal pattern of LVDF   - Continue home aspirin, carvedilol, furosemide, lisinopril   - 1800 mL daily fluid restriction, cardiac diet  - Continuous telemetry monitoring   - Monitor volume status      DVT PPx: Lovenox   GI PPx: Protonix   Diet: Regular   Code Status: Full      Disposition: Patient requires inpatient management at this time.      Patient seen/evaluated and plan of care discussed with attending Dr. Flory Corral.         Jyoti Sanchez PA-C

## 2024-08-09 NOTE — PROGRESS NOTES
08/09/24 1051   Discharge Planning   Living Arrangements Other (Comment)  (home with 24 hour care)   Support Systems Children;Other (Comment)  (private caregivers (24/7))   Type of Residence Private residence   Home or Post Acute Services In home services   Type of Home Care Services Home PT;Home OT;DME or oxygen   Expected Discharge Disposition  Services   Does the patient need discharge transport arranged? No     Met with caregiver at bedside. Patient with advanced dementia. Caregiver Aline asked [facility] to call Romana patient's daughter for assessment, Called Romana to discuss discharge planning, informed daughter PT/OT rec. LOW level for C. Daughter states she does not want patient to discharge to a SNF due to patient's advanced dementia, she would like patient to discharge to home with family/24 hour caregivers and [facility] for PT/OT. [facility] Rom[facility]. Patient to discharge to home with [facility] when Medically ready. Provider aware of need for internal referral needs sent to [facility].

## 2024-08-09 NOTE — PROGRESS NOTES
Physical Therapy    Physical Therapy Evaluation    Patient Name: Blu Grigsby  MRN: [id number]  Today's Date: 8/9/2024   Time Calculation  Start Time: 1310  Stop Time: 1325  Time Calculation (min): 15 min    Assessment/Plan   PT Assessment  PT Assessment Results: Decreased endurance, Impaired balance, Decreased mobility, Decreased cognition, Decreased safety awareness, Impaired judgement  Rehab Prognosis: Good  Evaluation/Treatment Tolerance: Patient tolerated treatment well  Medical Staff Made Aware: Yes  Strengths: Housing layout, Rehab experience, Support of Caregivers  Barriers to Participation: Comorbidities  End of Session Communication: Bedside nurse  Assessment Comment: Pt presents with slight decline from functional baseline requiring increased assistance for  bed mobility, transfers and safe ambulation. Pt able to ambulate without lob, utilizing walker for support. Only requiring assistance for directional negotiation and cueing to remain inside walker for safety. Pt demonstrates decreased mobility, stability, balance, impaired gait mechanics and decreaesd tolerance to activity.PT warranted at this time to address impairments so pt can progress towards PLOF and/or safe mobility.  End of Session Patient Position: Alarm off, not on at start of session, Up in chair (wife present)  IP OR SWING BED PT PLAN  Inpatient or Swing Bed: Inpatient  PT Plan  Treatment/Interventions: Bed mobility, Transfer training, Gait training, Balance training, Neuromuscular re-education, Strengthening, Therapeutic exercise, Therapeutic activity  PT Plan: Ongoing PT  PT Frequency: 3 times per week  PT Discharge Recommendations: Low intensity level of continued care  PT Recommended Transfer Status: Contact guard  PT - OK to Discharge: Yes  Based on completed evaluation and care plan recommendations, no barriers to discharge to next site of care       Subjective   General Visit Information:  General  Reason for Referral: impaired  self-care d/t admission for pneumonia  Referred By: AMINAH Sanderson  Past Medical History Relevant to Rehab: AAA, CHF, dementia, HLD, HTN  Family/Caregiver Present: Yes  Prior to Session Communication: Bedside nurse  Patient Position Received: Alarm off, not on at start of session, Up in chair (wife present)  Preferred Learning Style: verbal, kinesthetic  General Comment: [place], O2 via nc 2 L, tele  Home Living:  Home Living  Type of Home: House  Lives With: Alone  Home Adaptive Equipment: Walker rolling or standard, Cane  Home Layout: Two level (stays on main level)  Home Living Comments: pt has 24 hour care/supervision.  Prior Level of Function:  Prior Function Per Pt/Caregiver Report  Level of [place]: Needs assistance with ADLs, Needs assistance with homemaking, Needs assistance with functional transfers  Precautions:  Precautions  Medical Precautions: Fall precautions  Vital Signs:  Vital Signs  Heart Rate: 84  Heart Rate Source: Monitor  SpO2: 95 %    Objective   Pain:  Pain Assessment  0-10 (Numeric) Pain Score: 0 - No pain  Cognition:  Cognition  Orientation Level: Disoriented to place, Disoriented to time, Disoriented to situation    General Assessments:                  Activity Tolerance  Endurance: Decreased tolerance for upright activites    Strength  Strength Comments: WFL  Static Sitting Balance  Static Sitting-Level of Assistance: Modified independent    Static Standing Balance  Static Standing-Level of Assistance: Contact guard  Dynamic Standing Balance  Dynamic Standing-Comments: F  Functional Assessments:  Bed Mobility  Bed Mobility: Yes  Bed Mobility 1  Bed Mobility 1: Supine to sitting  Level of Assistance 1: Minimum assistance    Transfers  Transfer: Yes  Transfer 1  Technique 1: Sit to stand, Stand to sit  Transfer Device 1: Walker, Gait belt  Transfer Level of Assistance 1: Contact guard  Trials/Comments 1: cueing for hand placement, pt attempts to stand grabbing walker and not  pushing up from seated surface.    Ambulation/Gait Training  Ambulation/Gait Training Performed: Yes  Ambulation/Gait Training 1  Device 1: Rolling walker  Gait Support Devices: Gait belt  Assistance 1: Contact guard  Quality of Gait 1: Inconsistent stride length, Decreased step length (cueing for walker advancement and to remain inside walker)  Comments/Distance (ft) 1: 50' x 1 with fww, pt with mild unsteadiness but no lob. Cueing due to cognitive deficits for remaining in walker and directional negotiation.    Outcome Measures:  [facility] Basic Mobility  Turning from your back to your side while in a flat bed without using bedrails: A little  Moving from lying on your back to sitting on the side of a flat bed without using bedrails: A little  Moving to and from bed to chair (including a wheelchair): A little  Standing up from a chair using your arms (e.g. wheelchair or bedside chair): A little  To walk in hospital room: A little  Climbing 3-5 steps with railing: A little  Basic Mobility - Total Score: 18    Encounter Problems       Encounter Problems (Active)       Mobility       bed mobility        Start:  08/09/24    Expected End:  08/16/24       Pt will perform bed mobility and sit to supine to sit with cga          transfer       Start:  08/09/24    Expected End:  08/16/24       Pt will transfer sit to stand to sit and from bed to chair to bed mod I with minimal cueing for safety and hand placement         ambulation       Start:  08/09/24    Expected End:  08/16/24       Pt will ambulate with fww 75' x 2 with improved gait mechanics and safety             Pain - Adult              Education Documentation  Mobility Training, taught by Foster Skelton, PT at 8/9/2024  1:34 PM.  Learner: Family, Patient  Readiness: Acceptance  Method: Explanation, Demonstration  Response: Verbalizes Understanding, Needs Reinforcement, Demonstrated Understanding  Comment: Educated on hand placement during sit to stand transfers and  proper negotiation of walker during ambulation.    Education Comments  No comments found.

## 2024-08-09 NOTE — CARE PLAN
The clinical goals for the shift include Pt will be free of injury today    Pt oriented x1. Had a caretaker with him all day. Up in chair with 2 assist. He is weak in legs. No signs of pain. Appetite fair--able to feed self. Takes meds whole. Had large BM on BSC. Incont urine. Tele shows SR BBB PACs. PT worked with pt.

## 2024-08-09 NOTE — CARE PLAN
The patient's goals for the shift include pt more interactive with care. Heavy incont mult times this shift

## 2024-08-09 NOTE — PROGRESS NOTES
Speech-Language Pathology Clinical Swallow Evaluation    Patient Name: Blu Grigsby  MRN: [id number]  : 1940  Today's Date: 24  Start time: Start Time: 1310  Stop time: Stop Time: 1325  Time calculation (min) : Time Calculation (min): 15 min      ASSESSMENT  Impressions:  Pt presents with aspiration risk with all consistencies due to bolus inattention and speaking while consuming bolus.  Pt had voice quality change x 1 with puree.  He was able to imitate model of cough/throat clear to clear to baseline VQ.  Pt has strong cough and adequate palpated swallow.         PLAN  Recommendations:  MBSS recommended: SLP will continue to monitor to determine if MBSS is clinically warranted.  Solid consistency: Regular (IDDSI level 7)  Liquid consistency: Thin (IDDSI 0)  Medication administration: Whole, in puree  Compensatory swallow strategies: - Upright positioning for all PO intake  - Remain upright for >30 min after meals  - Supervision recommended during meals    Recommended frequency/duration:  Skilled SLP services recommended: Yes  Frequency: 3x/week  Duration: Current admission  Discharge recommendation: Unable to determine at this time; please see follow-up notes for DC recommendation.  Strengths: Family/caregiver support  Barriers to participation in tx: Cognition and Baseline impaired functional status    Goals (start date 24):  Pt will consume prescribed diet (current diet is regular solids with thin liquids with use of throat clear/cough and second swallow) without overt s/sx aspiration/penetration in 95% of observed trials.     SUBJECTIVE    PMHx relevant to rehab: Community acquired bacterial pneumonia. 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 Severe dementia, HTN, Stage C chronic heart failure, hypoxia, AMS    Chief complaint: Pt was admitted on 24 due to acute hypoxic respiratory failure    Relevant imaging results: Probable trace left pleural  effusion. Mild to moderate emphysema. Diffuse bronchial wall thickening. There is some associated mucous plugging with small superimposed patchy areas of airspace consolidation in the lower lobes left greater than right. Moderate motion artifact.      Prior to Session Communication: Bedside nurse  RN cleared pt to participate in session and reported tolerating prescribed diet.    Pain Assessment  Pain Assessment: 0-10  0-10 (Numeric) Pain Score: 0 - No pain     Orientation: Oriented to self  Ability to follow functional commands: Requires cueing to follow simple commands  Nutritional status: Appears well-nourished/no concerns    Patient positioning: Upright in chair    OBJECTIVE  Clinical swallow evaluation completed and consisted of interview, oral motor assessment, and PO trials (puree, thin via straw, ice chips, regular solids).  ORAL PHASE: Natural dentition in adequate condition. Oral mucosa were pink, moist, and free of obvious lesions. Labial strength/ROM were wfl. Labial seal was adequate. Mastication of regular solids was adequate. A/P transit was inconsistent due to inattention to bolus. No oral residuals were seen.  PHARYNGEAL PHASE: Laryngeal elevation was visualized or palpated with all trials, however adequacy of hyolaryngeal elevation/excursion cannot be determined at bedside. No immediate or delayed s/sx aspiration/penetration were observed with any consistencies.      Treatment/Education:  Results and recommendations were relayed to: Patient, Family, and Bedside nurse  Education provided: Yes   Learner: Patient and Family   Barriers to learning: Cognitive limitations barrier   Method of teaching: Verbal and Written   Topic: Role of ST, results of assessment, risk for aspiration, recommended diet modifications, recommendation for MBSS, recommended safe swallow strategies, and recommendation for dysphagia follow-up   Outcome of teaching: Caregiver demonstrated good understanding  Treatment provided:  No

## 2024-08-10 VITALS
TEMPERATURE: 97.6 F | OXYGEN SATURATION: 95 % | HEIGHT: 60 IN | HEART RATE: 83 BPM | WEIGHT: 186.95 LBS | BODY MASS INDEX: 36.7 KG/M2 | RESPIRATION RATE: 19 BRPM | SYSTOLIC BLOOD PRESSURE: 166 MMHG | DIASTOLIC BLOOD PRESSURE: 98 MMHG

## 2024-08-10 LAB
ANION GAP SERPL CALC-SCNC: 10 MMOL/L (ref 10–20)
BACTERIA SPEC RESP CULT: NORMAL
BUN SERPL-MCNC: 30 MG/DL (ref 6–23)
CALCIUM SERPL-MCNC: 9.2 MG/DL (ref 8.6–10.3)
CHLORIDE SERPL-SCNC: 101 MMOL/L (ref 98–107)
CO2 SERPL-SCNC: 33 MMOL/L (ref 21–32)
CREAT SERPL-MCNC: 0.96 MG/DL (ref 0.5–1.3)
EGFRCR SERPLBLD CKD-EPI 2021: 78 ML/MIN/1.73M*2
ERYTHROCYTE [DISTWIDTH] IN BLOOD BY AUTOMATED COUNT: 16 % (ref 11.5–14.5)
GLUCOSE SERPL-MCNC: 160 MG/DL (ref 74–99)
GRAM STN SPEC: NORMAL
GRAM STN SPEC: NORMAL
HCT VFR BLD AUTO: 42.1 % (ref 41–52)
HGB BLD-MCNC: 13.2 G/DL (ref 13.5–17.5)
MCH RBC QN AUTO: 29.2 PG (ref 26–34)
MCHC RBC AUTO-ENTMCNC: 31.4 G/DL (ref 32–36)
MCV RBC AUTO: 93 FL (ref 80–100)
NRBC BLD-RTO: 0 /100 WBCS (ref 0–0)
PLATELET # BLD AUTO: 242 X10*3/UL (ref 150–450)
POTASSIUM SERPL-SCNC: 4.2 MMOL/L (ref 3.5–5.3)
RBC # BLD AUTO: 4.52 X10*6/UL (ref 4.5–5.9)
SODIUM SERPL-SCNC: 140 MMOL/L (ref 136–145)
WBC # BLD AUTO: 17.2 X10*3/UL (ref 4.4–11.3)

## 2024-08-10 PROCEDURE — 80048 BASIC METABOLIC PNL TOTAL CA: CPT | Mod: IPSPLIT

## 2024-08-10 PROCEDURE — 97110 THERAPEUTIC EXERCISES: CPT | Mod: GP,CQ,IPSPLIT

## 2024-08-10 PROCEDURE — 2500000004 HC RX 250 GENERAL PHARMACY W/ HCPCS (ALT 636 FOR OP/ED): Mod: IPSPLIT

## 2024-08-10 PROCEDURE — 36415 COLL VENOUS BLD VENIPUNCTURE: CPT | Mod: IPSPLIT

## 2024-08-10 PROCEDURE — 97116 GAIT TRAINING THERAPY: CPT | Mod: GP,CQ,IPSPLIT

## 2024-08-10 PROCEDURE — 2500000001 HC RX 250 WO HCPCS SELF ADMINISTERED DRUGS (ALT 637 FOR MEDICARE OP): Mod: IPSPLIT

## 2024-08-10 PROCEDURE — 85027 COMPLETE CBC AUTOMATED: CPT | Mod: IPSPLIT

## 2024-08-10 RX ORDER — BENZONATATE 200 MG/1
200 CAPSULE ORAL 3 TIMES DAILY PRN
Qty: 42 CAPSULE | Refills: 0 | Status: SHIPPED | OUTPATIENT
Start: 2024-08-10

## 2024-08-10 RX ORDER — DOXYCYCLINE 100 MG/1
100 CAPSULE ORAL 2 TIMES DAILY
Qty: 10 CAPSULE | Refills: 0 | Status: SHIPPED | OUTPATIENT
Start: 2024-08-10 | End: 2024-08-15

## 2024-08-10 RX ORDER — CEFUROXIME AXETIL 500 MG/1
500 TABLET ORAL 2 TIMES DAILY
Qty: 10 TABLET | Refills: 0 | Status: SHIPPED | OUTPATIENT
Start: 2024-08-10 | End: 2024-08-15

## 2024-08-10 RX ORDER — GUAIFENESIN 600 MG/1
600 TABLET, EXTENDED RELEASE ORAL EVERY 12 HOURS PRN
Qty: 28 TABLET | Refills: 0 | Status: SHIPPED | OUTPATIENT
Start: 2024-08-10 | End: 2024-08-24

## 2024-08-10 RX ADMIN — FUROSEMIDE 40 MG: 40 TABLET ORAL at 08:21

## 2024-08-10 RX ADMIN — LISINOPRIL 20 MG: 20 TABLET ORAL at 08:21

## 2024-08-10 RX ADMIN — GUAIFENESIN AND DEXTROMETHORPHAN 5 ML: 100; 10 SYRUP ORAL at 06:44

## 2024-08-10 RX ADMIN — CEFTRIAXONE 1 G: 1 INJECTION, SOLUTION INTRAVENOUS at 08:21

## 2024-08-10 RX ADMIN — METHYLPREDNISOLONE SODIUM SUCCINATE 40 MG: 40 INJECTION, POWDER, FOR SOLUTION INTRAMUSCULAR; INTRAVENOUS at 02:46

## 2024-08-10 RX ADMIN — POLYETHYLENE GLYCOL 3350 17 G: 17 POWDER, FOR SOLUTION ORAL at 08:21

## 2024-08-10 RX ADMIN — DONEPEZIL HYDROCHLORIDE 10 MG: 5 TABLET ORAL at 08:21

## 2024-08-10 RX ADMIN — MEMANTINE 10 MG: 5 TABLET ORAL at 08:21

## 2024-08-10 RX ADMIN — ASPIRIN 81 MG CHEWABLE TABLET 81 MG: 81 TABLET CHEWABLE at 08:21

## 2024-08-10 RX ADMIN — DOXYCYCLINE 100 MG: 100 INJECTION, POWDER, LYOPHILIZED, FOR SOLUTION INTRAVENOUS at 01:16

## 2024-08-10 RX ADMIN — CARVEDILOL 3.12 MG: 3.12 TABLET, FILM COATED ORAL at 08:21

## 2024-08-10 ASSESSMENT — COGNITIVE AND FUNCTIONAL STATUS - GENERAL
TURNING FROM BACK TO SIDE WHILE IN FLAT BAD: A LITTLE
MOVING TO AND FROM BED TO CHAIR: A LITTLE
CLIMB 3 TO 5 STEPS WITH RAILING: A LITTLE
MOVING FROM LYING ON BACK TO SITTING ON SIDE OF FLAT BED WITH BEDRAILS: A LITTLE
TURNING FROM BACK TO SIDE WHILE IN FLAT BAD: A LITTLE
CLIMB 3 TO 5 STEPS WITH RAILING: A LITTLE
EATING MEALS: A LITTLE
WALKING IN HOSPITAL ROOM: A LITTLE
STANDING UP FROM CHAIR USING ARMS: A LITTLE
DRESSING REGULAR LOWER BODY CLOTHING: A LITTLE
PERSONAL GROOMING: A LITTLE
WALKING IN HOSPITAL ROOM: A LITTLE
MOBILITY SCORE: 18
DAILY ACTIVITIY SCORE: 18
TOILETING: A LITTLE
DRESSING REGULAR UPPER BODY CLOTHING: A LITTLE
HELP NEEDED FOR BATHING: A LITTLE
MOBILITY SCORE: 18
MOVING FROM LYING ON BACK TO SITTING ON SIDE OF FLAT BED WITH BEDRAILS: A LITTLE
MOVING TO AND FROM BED TO CHAIR: A LITTLE
STANDING UP FROM CHAIR USING ARMS: A LITTLE

## 2024-08-10 ASSESSMENT — PAIN SCALES - GENERAL
PAINLEVEL_OUTOF10: 0 - NO PAIN

## 2024-08-10 ASSESSMENT — PAIN - FUNCTIONAL ASSESSMENT: PAIN_FUNCTIONAL_ASSESSMENT: 0-10

## 2024-08-10 NOTE — PROGRESS NOTES
Physical Therapy    Physical Therapy Treatment    Patient Name: Blu Grigsby  MRN: [id number]  Today's Date: 8/10/2024  Time Calculation  Start Time: 0916  Stop Time: 0952  Time Calculation (min): 36 min    Assessment/Plan Pt. Able to tolerate all exercises and ambulation, and will be discharged home later today.  PT Assessment  PT Assessment Results: Decreased strength, Decreased cognition, Impaired judgement, Decreased safety awareness  Evaluation/Treatment Tolerance: Patient tolerated treatment well  End of Session Communication: Bedside nurse  End of Session Patient Position: Up in chair, Alarm on  PT Plan  Inpatient/Swing Bed or Outpatient: Inpatient  PT Plan  Treatment/Interventions: Bed mobility, Transfer training, Gait training, Balance training, Neuromuscular re-education, Strengthening, Therapeutic exercise, Therapeutic activity  PT Plan: Ongoing PT  PT Frequency: 3 times per week  PT Discharge Recommendations: Low intensity level of continued care  PT Recommended Transfer Status: Contact guard  PT - OK to Discharge: Yes    General Visit Information:   PT  Visit  PT Received On: 08/10/24  Response to Previous Treatment: Patient unable to report, no changes reported from family or staff  General  Reason for Referral: impaired mobility, decreased activity tolerance  Past Medical History Relevant to Rehab: impaired cognition, safety awareness  Family/Caregiver Present: Yes  Prior to Session Communication: Bedside nurse  Patient Position Received: Up in chair, Alarm on    Subjective  Pt's family member stated that pt. Had walked a good distance yesterday, but he had not slept at all last night, so he was probably tired today.  Precautions:  Precautions  STEADI Fall Risk Score (The score of 4 or more indicates an increased risk of falling): fall risk  Vital Signs:  Vital Signs  Heart Rate: 87  Heart Rate Source: Monitor  SpO2: 95 %    Objective  Pt. Able to transfer with supervision, and ambulate 150 ft.  Without signs of fatigue  Pain:  Pain Assessment  0-10 (Numeric) Pain Score: 0 - No pain  Cognition:  Cognition  Orientation Level: Disoriented X4  Activity Tolerance:  Activity Tolerance  Activity Tolerance Comments: Pt. able to tolerate all exercises and ambulation of 150 ft. without fatigue  Treatments:  Therapeutic Exercise  Therapeutic Exercise Performed: Yes  Therapeutic Exercise Activity 1: seated hip ABD/ADD with resistance  3 sec. hold x 10  Therapeutic Exercise Activity 2: seated LAQ 2 x 10  Therapeutic Exercise Activity 3: seated heel/toe raises x 20  Therapeutic Exercise Activity 4: seated with LEs up SLR x 10 ea.    Ambulation/Gait Training  Ambulation/Gait Training Performed: Yes  Ambulation/Gait Training 1  Surface 1: Level tile  Device 1: Rolling walker  Gait Support Devices: Gait belt  Assistance 1: Contact guard  Quality of Gait 1: Inconsistent stride length, Decreased step length, Shuffling gait  Comments/Distance (ft) 1: pt. required VCs/ TCs for direction negotiation and to stay inside walker.  Transfers  Transfer: Yes  Transfer 1  Transfer to 1: Sit, Stand  Technique 1: Sit to stand, Stand to sit  Transfer Device 1: Walker, Gait belt  Transfer Level of Assistance 1: Close supervision  Transfers 2  Transfer From 2: Toilet to  Transfer to 2: Sit, Stand  Technique 2: Sit to stand, Stand to sit  Transfer Device 2: Walker, Gait belt  Transfer Level of Assistance 2: Close supervision, Contact guard  Trials/Comments 2: VCs/TCs to move inside walker, and reach for grab bars    Outcome Measures:  [facility] Basic Mobility  Turning from your back to your side while in a flat bed without using bedrails: A little  Moving from lying on your back to sitting on the side of a flat bed without using bedrails: A little  Moving to and from bed to chair (including a wheelchair): A little  Standing up from a chair using your arms (e.g. wheelchair or bedside chair): A little  To walk in hospital room: A little  Climbing  3-5 steps with railing: A little  Basic Mobility - Total Score: 18    Education Documentation  No documentation found.  Education Comments  No comments found.        OP EDUCATION:       Encounter Problems       Encounter Problems (Resolved)       Mobility       bed mobility  (Adequate for Discharge)       Start:  08/09/24    Expected End:  08/16/24    Resolved:  08/10/24    Pt will perform bed mobility and sit to supine to sit with cga          transfer (Adequate for Discharge)       Start:  08/09/24    Expected End:  08/16/24    Resolved:  08/10/24    Pt will transfer sit to stand to sit and from bed to chair to bed mod I with minimal cueing for safety and hand placement         ambulation (Adequate for Discharge)       Start:  08/09/24    Expected End:  08/16/24    Resolved:  08/10/24    Pt will ambulate with fww 75' x 2 with improved gait mechanics and safety             Pain - Adult

## 2024-08-10 NOTE — DISCHARGE SUMMARY
Discharge Diagnosis  Community acquired bacterial pneumonia  Acute Respiratory failure with hypoxia  COPD exacerbation    Issues Requiring Follow-Up      Discharge Meds     Your medication list        START taking these medications        Instructions Last Dose Given Next Dose Due   benzonatate 200 mg capsule  Commonly known as: Tessalon      Take 1 capsule (200 mg) by mouth 3 times a day as needed for cough. Do not crush or chew.       cefuroxime 500 mg tablet  Commonly known as: Ceftin      Take 1 tablet (500 mg) by mouth 2 times a day for 5 days.       doxycycline 100 mg capsule  Commonly known as: Vibramycin      Take 1 capsule (100 mg) by mouth 2 times a day for 5 days. Take with at least 8 ounces (large glass) of water, do not lie down for 30 minutes after       guaiFENesin 600 mg 12 hr tablet  Commonly known as: Mucinex      Take 1 tablet (600 mg) by mouth every 12 hours if needed for congestion for up to 14 days. Do not crush, chew, or split.              CHANGE how you take these medications        Instructions Last Dose Given Next Dose Due   donepezil 10 mg tablet  Commonly known as: Aricept  What changed:   when to take this  additional instructions      Take 1 tablet (10 mg) by mouth once daily in the evening.       lisinopril 20 mg tablet  What changed: additional instructions      Take 1 tablet (20 mg) by mouth once daily.       mirtazapine 15 mg tablet  Commonly known as: Remeron  What changed: Another medication with the same name was removed. Continue taking this medication, and follow the directions you see here.                  CONTINUE taking these medications        Instructions Last Dose Given Next Dose Due   aspirin 81 mg chewable tablet           carvedilol 3.125 mg tablet  Commonly known as: Coreg           divalproex 125 mg EC tablet  Commonly known as: Depakote      TAKE 1 TABLET (125 MG) BY MOUTH ONCE DAILY AT BEDTIME. DO NOT CRUSH, CHEW, OR SPLIT.       furosemide 40 mg tablet  Commonly  known as: Lasix      Take 1 tablet (40 mg) by mouth 2 times daily (morning and late afternoon).       memantine 10 mg tablet  Commonly known as: Namenda      Take 1 tablet (10 mg) by mouth 2 times a day.       One Daily Multivitamin tablet  Generic drug: multivitamin           PRESERVISION AREDS ORAL                  STOP taking these medications      azithromycin 250 mg tablet  Commonly known as: Zithromax                  Where to Get Your Medications        These medications were sent to [facility]/pharmacy #[id number] - [place], [place] - [address] [place]  [address] [address]      Phone: [phone]   benzonatate 200 mg capsule  cefuroxime 500 mg tablet  doxycycline 100 mg capsule  guaiFENesin 600 mg 12 hr tablet         Test Results Pending At Discharge  Pending Labs       Order Current Status    Blood Culture Preliminary result    Blood Culture Preliminary result    Respiratory Culture/Smear Preliminary result        84 y.o. male presenting with weakness, worsening confusion, and cough. Patient was brought in to the ED by EMS for hypoxia. Patient has a known history of dementia and is a poor historian; majority of history is from chart review and family at bedside. Patient lives at home with family with home health care several times a week. Patient has had a cough for about 5 days now; his PCP sent in azithromycin which he has been taking for 2 days without improvement. He has been more confused than his baseline per family. Patient reports having the chills. He does not wear any home O2 per family. EMS was called to the patient's home and he was found to be 82% on room air so he was brought to the ED. On arrival patient was on 2L with SpO2 94%. Patient remained afebrile. Labs were significant for WBC 12.3. COVID was negative. CT head did not show any acute process. CTA chest for PE was negative for large PE, did show mild to moderate emphysema with diffuse bronchial wall thickening suggestive of bronchitis and  an area of consolidation in the left lower lobe.  Patient was given 1L LR, azithromycin, and ceftriaxone in the ED. He is admitted to med/surg for further treatment of pneumonia. Plan of care discussed with patient's son/POA, all questions answered.     Hospital Course    Acute hypoxic respiratory failure  Pneumonia, community acquired  COPD exacerbtion  - Patient brought in by family for cough, worsening confusion/weakness  - SpO2 82% on RA on EMS arrival   - WBC 12.3 > 9.8   - Lactate 1.0   - COVID negative   - Procal 0.15  - BCx negative to date  - Sputum culture with no predominant organism  - CTA chest for PE: Moderate motion artifact.  No large pulmonary embolism. 2. Diffuse bronchial wall thickening suggestive of bronchitis. There is some associated mucous plugging with small superimposed patchy areas of airspace consolidation in the lower lobes left greater than right.  Correlate clinically for pneumonia. 3. Mild to moderate emphysema.    - Received azithromycin and ceftriaxone in the ED   - ATBs changed to doxycycline/ceftriaxone- patient on donepezil   - Continue Solumedrol 40 mg IVP q8h   - DuoNebs TID  - Mucinex BID  - Tylenol PRN for fever   - RT eval and treat protocol  - Bronchial hygiene  - Aspiration precautions ordered   - Baseline O2 None   - Wean O2 as tolerated; patient currently on 2L O2 via NC   - SLP consulted to rule out aspiration; patient currently on soft/bite-sized diet with no evidence of aspiration      Dementia, unspecified type  - At home with 24/7 care and C   - Continue divalproex, donepezil, memantine, mirtazapine  - Delirium precautions: Frequent reorientation, day/night normalization, shades open for sunlight, provide glasses/hearing aids as needed  - PT/OT evals   - TCC on for discharge planning     Essential HTN  HLD  CAD  Chronic systolic heart failure, not in acute exacerbation  - BNP 1141 on admission, patient does not appear volume overloaded on exam   - CTA chest for  PE: 4. Cardiomegaly with severe coronary artery calcifications.   - Echo from 2021 with LEF 45-50%, abnormal pattern of LVDF   - Continue home aspirin, carvedilol, furosemide, lisinopril   - 1800 mL daily fluid restriction, cardiac diet  - Continuous telemetry monitoring   - Monitor volume status     PUD ppx  - continue pantoprazole     DVT PPx:   - continue enoxaparin     Code Status: Full      Disposition: Patient was stable to be discharged to home. He was discharged on cefuroxime, doxycycline, and tessalon pearles. He will follow up with his PCP.    Total cumulative time spent in preparation of this discharge including documentation review, coordination of care with the medical team including PT/SW/care coordinators and treating consultants, discussion with patient and pertinent family members and finalization of prescriptions, follow-up appointments, and this discharge summary was approximately 45 minutes.     Pertinent Physical Exam At Time of Discharge  Physical Exam  Vitals reviewed.   Constitutional:       Appearance: Normal appearance. He is normal weight.   HENT:      Head: Normocephalic and atraumatic.      Right Ear: External ear normal.      Left Ear: External ear normal.      Nose: Nose normal.      Mouth/Throat:      Mouth: Mucous membranes are moist.      Pharynx: Oropharynx is clear.   Eyes:      Conjunctiva/sclera: Conjunctivae normal.      Pupils: Pupils are equal, round, and reactive to light.   Cardiovascular:      Rate and Rhythm: Normal rate and regular rhythm.      Pulses: Normal pulses.      Heart sounds: Normal heart sounds.   Pulmonary:      Effort: Pulmonary effort is normal.      Breath sounds: Rales present.   Abdominal:      General: Bowel sounds are normal.      Palpations: Abdomen is soft.   Musculoskeletal:         General: Normal range of motion.      Cervical back: Normal range of motion and neck supple.   Skin:     General: Skin is warm and dry.   Neurological:      General: No  focal deficit present.      Mental Status: He is alert. He is disoriented.   Psychiatric:         Mood and Affect: Mood normal.         Behavior: Behavior normal.         Outpatient Follow-Up  Future Appointments   Date Time Provider Department Center   9/12/2024  3:15 PM Bryan Reyes MD [id number] [facility]   9/18/2024 12:00 PM Toya Dickey MD [id number] [facility]         Priscilla Espinosa, APRN-CNP

## 2024-08-11 LAB
BACTERIA BLD CULT: NORMAL
BACTERIA BLD CULT: NORMAL

## 2024-08-12 ENCOUNTER — TELEPHONE (OUTPATIENT)
Dept: PRIMARY CARE | Facility: CLINIC | Age: 84
End: 2024-08-12
Payer: MEDICARE

## 2024-08-12 LAB
BACTERIA BLD CULT: NORMAL
BACTERIA BLD CULT: NORMAL

## 2024-08-12 NOTE — TELEPHONE ENCOUNTER
Transition of Care    Inpatient facility: [facility]  Discharge diagnosis: PNE  Discharged to: Home  Discharge date: 8/10/24  Initial Call date: 8/12/24  Spoke with patient/caregiver: Caregiver                                                                     Do you need assistance  visits prior to your PCP visit: No  Home health care ordered: No  Have you been contacted by home care and have a start of care date: No  Are you taking medications as prescribed at discharge: Yes    Referral to [facility] Pharmacist: Yes  Patient advised to bring all medications to PCP follow-up appointment.  Patient advised to follow discharge instructions until provider follow-up.  TCM visit date: 8/14/24  TCM provider visit with: NICK Dickey MD

## 2024-08-13 ENCOUNTER — PATIENT OUTREACH (OUTPATIENT)
Dept: CARE COORDINATION | Facility: CLINIC | Age: 84
End: 2024-08-13
Payer: MEDICARE

## 2024-08-13 NOTE — PROGRESS NOTES
"First attempt made to reach patient for transitions of care outreach and no contact made with patient. Did not leave message answering machine said \"heating and cooling \"     Discharge Diagnosis  Community acquired bacterial pneumonia  Acute Respiratory failure with hypoxia  COPD exacerbation     Issues Requiring Follow-Up        Discharge Sudarshan Zhou , RN   Nurse Care Manager   [facility] Department   [phone]   "

## 2024-08-14 ENCOUNTER — PATIENT OUTREACH (OUTPATIENT)
Dept: CARE COORDINATION | Facility: CLINIC | Age: 84
End: 2024-08-14
Payer: MEDICARE

## 2024-08-14 NOTE — PROGRESS NOTES
"Second attempt made to reach patient for transitions of care outreach and no contact made with patient. Did not leave message answering machine said \" Eboni Vickers and heating and cooling \"   Patient should be offered Healthy at home and needs COPD assessment, falls risk. High risk score for readmissions.      Discharge Diagnosis  Community acquired bacterial pneumonia  Acute Respiratory failure with hypoxia  COPD exacerbation     Issues Requiring Follow-Up        Discharge Meds           Sade Zhou RN   Nurse Care Manager   [facility] Department   [phone]   "

## 2024-08-16 DIAGNOSIS — I10 ESSENTIAL (PRIMARY) HYPERTENSION: ICD-10-CM

## 2024-08-19 RX ORDER — LISINOPRIL 20 MG/1
20 TABLET ORAL 2 TIMES DAILY
Qty: 180 TABLET | Refills: 1 | Status: SHIPPED | OUTPATIENT
Start: 2024-08-19

## 2024-08-21 ENCOUNTER — APPOINTMENT (OUTPATIENT)
Dept: PRIMARY CARE | Facility: CLINIC | Age: 84
End: 2024-08-21
Payer: MEDICARE

## 2024-08-21 VITALS
SYSTOLIC BLOOD PRESSURE: 110 MMHG | TEMPERATURE: 97.8 F | BODY MASS INDEX: 35.97 KG/M2 | OXYGEN SATURATION: 96 % | HEART RATE: 69 BPM | WEIGHT: 184.2 LBS | DIASTOLIC BLOOD PRESSURE: 62 MMHG

## 2024-08-21 DIAGNOSIS — F03.C0 SEVERE DEMENTIA, UNSPECIFIED DEMENTIA TYPE, UNSPECIFIED WHETHER BEHAVIORAL, PSYCHOTIC, OR MOOD DISTURBANCE OR ANXIETY (MULTI): Primary | ICD-10-CM

## 2024-08-21 DIAGNOSIS — E55.9 VITAMIN D DEFICIENCY, UNSPECIFIED: ICD-10-CM

## 2024-08-21 DIAGNOSIS — I10 ESSENTIAL (PRIMARY) HYPERTENSION: ICD-10-CM

## 2024-08-21 DIAGNOSIS — I10 HYPERTENSION, ESSENTIAL: ICD-10-CM

## 2024-08-21 DIAGNOSIS — E78.00 HYPERCHOLESTEREMIA: ICD-10-CM

## 2024-08-21 PROCEDURE — 1111F DSCHRG MED/CURRENT MED MERGE: CPT | Performed by: INTERNAL MEDICINE

## 2024-08-21 PROCEDURE — 3074F SYST BP LT 130 MM HG: CPT | Performed by: INTERNAL MEDICINE

## 2024-08-21 PROCEDURE — 99495 TRANSJ CARE MGMT MOD F2F 14D: CPT | Performed by: INTERNAL MEDICINE

## 2024-08-21 PROCEDURE — 1159F MED LIST DOCD IN RCRD: CPT | Performed by: INTERNAL MEDICINE

## 2024-08-21 PROCEDURE — 1036F TOBACCO NON-USER: CPT | Performed by: INTERNAL MEDICINE

## 2024-08-21 PROCEDURE — 1157F ADVNC CARE PLAN IN RCRD: CPT | Performed by: INTERNAL MEDICINE

## 2024-08-21 PROCEDURE — 1160F RVW MEDS BY RX/DR IN RCRD: CPT | Performed by: INTERNAL MEDICINE

## 2024-08-21 PROCEDURE — 3078F DIAST BP <80 MM HG: CPT | Performed by: INTERNAL MEDICINE

## 2024-08-21 ASSESSMENT — ENCOUNTER SYMPTOMS
COUGH: 0
BLOOD IN STOOL: 0
PALPITATIONS: 0
DIARRHEA: 0
ABDOMINAL PAIN: 0
ARTHRALGIAS: 0
UNEXPECTED WEIGHT CHANGE: 0
SINUS PAIN: 0
CONFUSION: 1
DIFFICULTY URINATING: 0
DIZZINESS: 0
FEVER: 0
HEADACHES: 0
FATIGUE: 0
SORE THROAT: 0
WHEEZING: 0
BRUISES/BLEEDS EASILY: 0

## 2024-08-21 NOTE — PROGRESS NOTES
Subjective   Patient ID: Blu Grigsby is a 84 y.o. male who presents for Hospital Follow-up (TCM).    Transition of care  Patient admission history and physical, hospital course, medications, verified and reviewed  Patient contacted after discharge, medications verified comes today for follow-up    Inpatient facility: [facility]  Discharge diagnosis: PNE  Discharged to: Home  Discharge date: 8/10/24  Initial Call date: 8/12/24  Spoke with patient/caregiver: Caregiver                                                                      Do you need assistance  visits prior to your PCP visit: No  Home health care ordered: No  Have you been contacted by home care and have a start of care date: No  Are you taking medications as prescribed at discharge: Yes     Referral to [facility] Pharmacist: Yes  Patient advised to bring all medications to PCP follow-up appointment.  Patient advised to follow discharge instructions until provider follow-up.  TCM visit date: 8/14/24  TCM provider visit with: NICK Dickey MD     Patient Came in today advanced dementia came in with financial power of  daughter  I spoke with the medical power of  family members Ronnie explained to patient his condition explained to him his recent CT findings also hypoxemia and his blood pressure understood he is going to communicate with his family  Discussed with him also the need to pursue hospice care due to advanced dementia again he is going to discuss with his family and get back to us    Patient recently admitted for COPD exacerbation pneumonia confusion discharged home on oral antibiotic counseled about home care and oxygen at home  - Status post pneumonia patient doing much better repeat x-ray in 4 weeks  -Hypoxemia improved since home health for night oximetry  -Patient caregiver advised to have family member with a power of  for his medical decision next appointment  Advanced dementia continue  "with comfort care  -Hypertension controlled continue current medication  -Advancing dementia patient to continue on Aricept and memantine  - Psychosis and dementia insomnia doing well continue with Remeron 15 mg at bedtime  - CHF cardiomyopathy patient with Lasix 40 mg twice a day  - Anxiety continue with Depakote at bedtime  - Hypertension controlled continue with carvedilol 3.125 twice a day  - Continues comfort care  Condition poor prognosis and poor awaiting family decision for hospice care             Review of Systems   Constitutional:  Negative for fatigue, fever and unexpected weight change.   HENT:  Negative for congestion, ear discharge, ear pain, mouth sores, sinus pain and sore throat.    Eyes:  Negative for visual disturbance.   Respiratory:  Negative for cough and wheezing.    Cardiovascular:  Negative for chest pain, palpitations and leg swelling.   Gastrointestinal:  Negative for abdominal pain, blood in stool and diarrhea.   Genitourinary:  Negative for difficulty urinating.   Musculoskeletal:  Negative for arthralgias.   Skin:  Negative for rash.   Neurological:  Negative for dizziness and headaches.   Hematological:  Does not bruise/bleed easily.   Psychiatric/Behavioral:  Positive for confusion. Negative for behavioral problems.    All other systems reviewed and are negative.      Objective   No results found for: \"HGBA1C\"   /62   Pulse 69   Temp 36.6 °C (97.8 °F)   Wt 83.6 kg (184 lb 3.2 oz)   SpO2 96%   BMI 35.97 kg/m²     Physical Exam  Vitals and nursing note reviewed.   Constitutional:       Appearance: Normal appearance.   HENT:      Head: Normocephalic.      Nose: Nose normal.   Eyes:      Conjunctiva/sclera: Conjunctivae normal.      Pupils: Pupils are equal, round, and reactive to light.   Cardiovascular:      Rate and Rhythm: Regular rhythm.   Pulmonary:      Effort: Pulmonary effort is normal.      Breath sounds: Normal breath sounds.   Abdominal:      General: Abdomen is " flat.      Palpations: Abdomen is soft.   Musculoskeletal:      Cervical back: Neck supple.   Skin:     General: Skin is warm.   Neurological:      General: No focal deficit present.      Mental Status: He is oriented to person, place, and time.   Psychiatric:         Mood and Affect: Mood normal.         Assessment/Plan   Blu was seen today for hospital follow-up.  Diagnoses and all orders for this visit:  Severe dementia, unspecified dementia type, unspecified whether behavioral, psychotic, or mood disturbance or anxiety (Multi) (Primary)  Hypertension, essential  Hypercholesteremia  Vitamin D deficiency, unspecified  Essential (primary) hypertension   Transition of care  Patient admission history and physical, hospital course, medications, verified and reviewed  Patient contacted after discharge, medications verified comes today for follow-up    Inpatient [facility]  Discharge diagnosis: PNE  Discharged to: Home  Discharge date: 8/10/24  Initial Call date: 8/12/24  Spoke with patient/caregiver: Caregiver                                                                      Do you need assistance  visits prior to your PCP visit: No  Home health care ordered: No  Have you been contacted by home care and have a start of care date: No  Are you taking medications as prescribed at discharge: Yes     Referral to [facility] Pharmacist: Yes  Patient advised to bring all medications to PCP follow-up appointment.  Patient advised to follow discharge instructions until provider follow-up.  TCM visit date: 8/14/24  TCM provider visit with: NICK Dickey MD     Patient Came in today advanced dementia came in with financial power of  daughter  I spoke with the medical power of  family members Ronnie explained to patient his condition explained to him his recent CT findings also hypoxemia and his blood pressure understood he is going to communicate with his family  Discussed  with him also the need to pursue hospice care due to advanced dementia again he is going to discuss with his family and get back to us    Patient recently admitted for COPD exacerbation pneumonia confusion discharged home on oral antibiotic counseled about home care and oxygen at home  - Status post pneumonia patient doing much better repeat x-ray in 4 weeks  -Hypoxemia improved since home health for night oximetry  -Patient caregiver advised to have family member with a power of  for his medical decision next appointment  Advanced dementia continue with comfort care  -Hypertension controlled continue current medication  -Advancing dementia patient to continue on Aricept and memantine  - Psychosis and dementia insomnia doing well continue with Remeron 15 mg at bedtime  - CHF cardiomyopathy patient with Lasix 40 mg twice a day  - Anxiety continue with Depakote at bedtime  - Hypertension controlled continue with carvedilol 3.125 twice a day  - Continues comfort care  Condition poor prognosis and poor awaiting family decision for hospice care

## 2024-08-22 ENCOUNTER — PATIENT OUTREACH (OUTPATIENT)
Dept: CARE COORDINATION | Facility: CLINIC | Age: 84
End: 2024-08-22
Payer: MEDICARE

## 2024-08-22 NOTE — PROGRESS NOTES
Second attempt made to reach patient for transitions of care outreach call  and no contact made with patient. Left voicemail message with my name and contact information. Patient was discharged from SNF and followed up with Toya Dickey MD   Two outreaches with no call back.       Sade Zhou , RN   Nurse Care Manager   [facility]   [phone]

## 2024-08-29 DIAGNOSIS — I10 ESSENTIAL (PRIMARY) HYPERTENSION: ICD-10-CM

## 2024-08-29 RX ORDER — CARVEDILOL 3.12 MG/1
3.12 TABLET ORAL 2 TIMES DAILY
Qty: 180 TABLET | Refills: 3 | Status: SHIPPED | OUTPATIENT
Start: 2024-08-29 | End: 2025-08-29

## 2024-09-12 ENCOUNTER — APPOINTMENT (OUTPATIENT)
Dept: CARDIOLOGY | Facility: HOSPITAL | Age: 84
End: 2024-09-12
Payer: MEDICARE

## 2024-09-12 DIAGNOSIS — I10 ESSENTIAL (PRIMARY) HYPERTENSION: ICD-10-CM

## 2024-09-12 RX ORDER — FUROSEMIDE 40 MG/1
40 TABLET ORAL
Qty: 180 TABLET | Refills: 1 | Status: SHIPPED | OUTPATIENT
Start: 2024-09-12

## 2024-09-18 ENCOUNTER — APPOINTMENT (OUTPATIENT)
Dept: PRIMARY CARE | Facility: CLINIC | Age: 84
End: 2024-09-18
Payer: MEDICARE

## 2024-09-18 VITALS
TEMPERATURE: 97.3 F | DIASTOLIC BLOOD PRESSURE: 94 MMHG | HEART RATE: 77 BPM | SYSTOLIC BLOOD PRESSURE: 178 MMHG | OXYGEN SATURATION: 87 %

## 2024-09-18 DIAGNOSIS — I50.9 CONGESTIVE HEART FAILURE, UNSPECIFIED HF CHRONICITY, UNSPECIFIED HEART FAILURE TYPE: Primary | ICD-10-CM

## 2024-09-18 DIAGNOSIS — E78.00 HYPERCHOLESTEREMIA: ICD-10-CM

## 2024-09-18 DIAGNOSIS — Z23 IMMUNIZATION DUE: ICD-10-CM

## 2024-09-18 DIAGNOSIS — F03.C0 SEVERE DEMENTIA, UNSPECIFIED DEMENTIA TYPE, UNSPECIFIED WHETHER BEHAVIORAL, PSYCHOTIC, OR MOOD DISTURBANCE OR ANXIETY: ICD-10-CM

## 2024-09-18 DIAGNOSIS — E55.9 VITAMIN D DEFICIENCY, UNSPECIFIED: ICD-10-CM

## 2024-09-18 DIAGNOSIS — I10 HYPERTENSION, UNSPECIFIED TYPE: ICD-10-CM

## 2024-09-18 DIAGNOSIS — I10 HYPERTENSION, ESSENTIAL: ICD-10-CM

## 2024-09-18 DIAGNOSIS — I10 ESSENTIAL (PRIMARY) HYPERTENSION: ICD-10-CM

## 2024-09-18 PROCEDURE — 99214 OFFICE O/P EST MOD 30 MIN: CPT | Performed by: INTERNAL MEDICINE

## 2024-09-18 PROCEDURE — 1157F ADVNC CARE PLAN IN RCRD: CPT | Performed by: INTERNAL MEDICINE

## 2024-09-18 PROCEDURE — 3080F DIAST BP >= 90 MM HG: CPT | Performed by: INTERNAL MEDICINE

## 2024-09-18 PROCEDURE — G0008 ADMIN INFLUENZA VIRUS VAC: HCPCS | Performed by: INTERNAL MEDICINE

## 2024-09-18 PROCEDURE — 1160F RVW MEDS BY RX/DR IN RCRD: CPT | Performed by: INTERNAL MEDICINE

## 2024-09-18 PROCEDURE — 1036F TOBACCO NON-USER: CPT | Performed by: INTERNAL MEDICINE

## 2024-09-18 PROCEDURE — 1159F MED LIST DOCD IN RCRD: CPT | Performed by: INTERNAL MEDICINE

## 2024-09-18 PROCEDURE — 90662 IIV NO PRSV INCREASED AG IM: CPT | Performed by: INTERNAL MEDICINE

## 2024-09-18 PROCEDURE — 3077F SYST BP >= 140 MM HG: CPT | Performed by: INTERNAL MEDICINE

## 2024-09-18 ASSESSMENT — ENCOUNTER SYMPTOMS
WHEEZING: 0
PALPITATIONS: 0
FEVER: 0
COUGH: 0
ARTHRALGIAS: 0
BLOOD IN STOOL: 0
UNEXPECTED WEIGHT CHANGE: 0
HEADACHES: 0
ABDOMINAL PAIN: 0
SINUS PAIN: 0
SORE THROAT: 0
BRUISES/BLEEDS EASILY: 0
FATIGUE: 0
DIFFICULTY URINATING: 0
DIARRHEA: 0
HYPERTENSION: 1
DIZZINESS: 0

## 2024-09-18 NOTE — PROGRESS NOTES
Subjective   Patient ID: Blu Grigsby is a 84 y.o. male who presents for Hypertension (Would like referral to closer cardiologist ), Hyperlipidemia, Dementia, Leg Swelling, and Flu Vaccine (Flu given).    -Patient comes today with his power of  patient and family declined hospice care at this time patient to continue full code  -Unable to keep up with the cardiology appointment [place] once to have local cardiology here will refer patient to cardiology in [place] for management of cardiomyopathy uncontrolled hypertension  CHF  Continues Lasix twice a day as recommended carvedilol 3.125 twice a day  - Chronic respiratory failure and COPD exacerbation improving continues 2 L oxygen continues  --Hypertension controlled continue current medication follow-up cardiology for further recommendation  -Advancing dementia patient to continue on Aricept and memantine controlled  - Psychosis and dementia insomnia doing well continue with Remeron 15 mg at bedtime improving  - Anxiety continue with Depakote at bedtime  - Hypertension controlled continue with carvedilol 3.125 twice a day.  -Condition poor prognosis and poor family is aware  Follow-up closely as needed  Flu vaccine given today  Follow-up 4 months      Hypertension  Pertinent negatives include no chest pain, headaches or palpitations.   Hyperlipidemia  Pertinent negatives include no chest pain.   Memory Loss             Review of Systems   Constitutional:  Negative for fatigue, fever and unexpected weight change.   HENT:  Negative for congestion, ear discharge, ear pain, mouth sores, sinus pain and sore throat.    Eyes:  Negative for visual disturbance.   Respiratory:  Negative for cough and wheezing.    Cardiovascular:  Negative for chest pain, palpitations and leg swelling.   Gastrointestinal:  Negative for abdominal pain, blood in stool and diarrhea.   Genitourinary:  Negative for difficulty urinating.   Musculoskeletal:  Negative for arthralgias.  "  Skin:  Negative for rash.   Neurological:  Negative for dizziness and headaches.   Hematological:  Does not bruise/bleed easily.   Psychiatric/Behavioral:  Negative for behavioral problems.    All other systems reviewed and are negative.      Objective   No results found for: \"HGBA1C\"   BP (!) 178/94   Pulse 77   Temp 36.3 °C (97.3 °F)   SpO2 (!) 87%   Lab Results   Component Value Date    WBC 17.2 (H) 08/10/2024    HGB 13.2 (L) 08/10/2024    HCT 42.1 08/10/2024     08/10/2024    CHOL 120 02/12/2024    TRIG 65 02/12/2024    HDL 42.8 02/12/2024    ALT 22 08/08/2024    AST 24 08/08/2024     08/10/2024    K 4.2 08/10/2024     08/10/2024    CREATININE 0.96 08/10/2024    BUN 30 (H) 08/10/2024    CO2 33 (H) 08/10/2024    TSH 2.33 02/12/2024    INR 1.2 (H) 01/27/2023     par   Physical Exam  Vitals and nursing note reviewed. Exam conducted with a chaperone present.   Constitutional:       Appearance: Normal appearance.   HENT:      Head: Normocephalic.      Nose: Nose normal.   Eyes:      Conjunctiva/sclera: Conjunctivae normal.      Pupils: Pupils are equal, round, and reactive to light.   Cardiovascular:      Rate and Rhythm: Regular rhythm.   Pulmonary:      Effort: Pulmonary effort is normal.      Breath sounds: Normal breath sounds.   Abdominal:      General: Abdomen is flat.      Palpations: Abdomen is soft.   Musculoskeletal:      Cervical back: Neck supple.   Skin:     General: Skin is warm.   Neurological:      General: No focal deficit present.      Mental Status: He is disoriented.      Sensory: No sensory deficit.      Motor: Weakness present.      Coordination: Coordination abnormal.      Gait: Gait abnormal.      Deep Tendon Reflexes: Reflexes abnormal.   Psychiatric:         Mood and Affect: Mood normal.         Assessment/Plan   Blu was seen today for hypertension, hyperlipidemia, dementia, leg swelling and flu vaccine.  Diagnoses and all orders for this visit:  Congestive heart " failure, unspecified HF chronicity, unspecified heart failure type (Multi) (Primary)  -     Referral to Cardiology; Future  Immunization due  -     Flu vaccine, trivalent, preservative free, HIGH-DOSE, age 65y+ (Fluzone)  Severe dementia, unspecified dementia type, unspecified whether behavioral, psychotic, or mood disturbance or anxiety (Multi)  -     Referral to Neurology; Future  Hypertension, essential  -     Referral to Cardiology; Future  Vitamin D deficiency, unspecified  Hypercholesteremia  Essential (primary) hypertension  Hypertension, unspecified type  Other orders  -     Follow Up In Primary Care - Established; Future   -Patient comes today with his power of  patient and family declined hospice care at this time patient to continue full code  -Unable to keep up with the cardiology appointment in [place] once to have local cardiology here will refer patient to cardiology in [place] for management of cardiomyopathy uncontrolled hypertension  CHF  Continues Lasix twice a day as recommended carvedilol 3.125 twice a day  - Chronic respiratory failure and COPD exacerbation improving continues 2 L oxygen continues  --Hypertension controlled continue current medication follow-up cardiology for further recommendation  -Advancing dementia patient to continue on Aricept and memantine controlled  - Psychosis and dementia insomnia doing well continue with Remeron 15 mg at bedtime improving  - Anxiety continue with Depakote at bedtime  - Hypertension controlled continue with carvedilol 3.125 twice a day.  -Condition poor prognosis and poor family is aware  Follow-up closely as needed  Flu vaccine given today  Follow-up 4 months

## 2024-10-10 DIAGNOSIS — F03.C0 SEVERE DEMENTIA, UNSPECIFIED DEMENTIA TYPE, UNSPECIFIED WHETHER BEHAVIORAL, PSYCHOTIC, OR MOOD DISTURBANCE OR ANXIETY: ICD-10-CM

## 2024-10-10 RX ORDER — MIRTAZAPINE 15 MG/1
15 TABLET, FILM COATED ORAL NIGHTLY
Qty: 30 TABLET | Refills: 2 | Status: SHIPPED | OUTPATIENT
Start: 2024-10-10

## 2024-10-31 DIAGNOSIS — F03.90 UNSPECIFIED DEMENTIA, UNSPECIFIED SEVERITY, WITHOUT BEHAVIORAL DISTURBANCE, PSYCHOTIC DISTURBANCE, MOOD DISTURBANCE, AND ANXIETY: ICD-10-CM

## 2024-10-31 RX ORDER — DONEPEZIL HYDROCHLORIDE 10 MG/1
10 TABLET, FILM COATED ORAL EVERY EVENING
Qty: 90 TABLET | Refills: 1 | Status: SHIPPED | OUTPATIENT
Start: 2024-10-31

## 2024-11-03 DIAGNOSIS — F03.C0 SEVERE DEMENTIA, UNSPECIFIED DEMENTIA TYPE, UNSPECIFIED WHETHER BEHAVIORAL, PSYCHOTIC, OR MOOD DISTURBANCE OR ANXIETY: ICD-10-CM

## 2024-11-04 RX ORDER — MEMANTINE HYDROCHLORIDE 10 MG/1
10 TABLET ORAL 2 TIMES DAILY
Qty: 180 TABLET | Refills: 1 | Status: SHIPPED | OUTPATIENT
Start: 2024-11-04

## 2024-12-26 ENCOUNTER — OFFICE VISIT (OUTPATIENT)
Dept: CARDIOLOGY | Facility: HOSPITAL | Age: 84
End: 2024-12-26
Payer: MEDICARE

## 2024-12-26 VITALS — SYSTOLIC BLOOD PRESSURE: 128 MMHG | HEART RATE: 72 BPM | DIASTOLIC BLOOD PRESSURE: 73 MMHG

## 2024-12-26 DIAGNOSIS — I34.0 MODERATE MITRAL REGURGITATION: ICD-10-CM

## 2024-12-26 DIAGNOSIS — I50.22 HEART FAILURE WITH MID-RANGE EJECTION FRACTION (HFMEF): Primary | Chronic | ICD-10-CM

## 2024-12-26 DIAGNOSIS — I25.83 CORONARY ARTERY DISEASE DUE TO LIPID RICH PLAQUE: ICD-10-CM

## 2024-12-26 DIAGNOSIS — I42.0 ISCHEMIC DILATED CARDIOMYOPATHY (MULTI): Chronic | ICD-10-CM

## 2024-12-26 DIAGNOSIS — I25.10 CORONARY ARTERY DISEASE DUE TO LIPID RICH PLAQUE: ICD-10-CM

## 2024-12-26 DIAGNOSIS — I10 HYPERTENSION, ESSENTIAL: ICD-10-CM

## 2024-12-26 DIAGNOSIS — I25.5 ISCHEMIC DILATED CARDIOMYOPATHY (MULTI): Chronic | ICD-10-CM

## 2024-12-26 PROCEDURE — 3074F SYST BP LT 130 MM HG: CPT | Performed by: INTERNAL MEDICINE

## 2024-12-26 PROCEDURE — 99214 OFFICE O/P EST MOD 30 MIN: CPT | Performed by: INTERNAL MEDICINE

## 2024-12-26 PROCEDURE — 1157F ADVNC CARE PLAN IN RCRD: CPT | Performed by: INTERNAL MEDICINE

## 2024-12-26 PROCEDURE — G2211 COMPLEX E/M VISIT ADD ON: HCPCS | Performed by: INTERNAL MEDICINE

## 2024-12-26 PROCEDURE — 3078F DIAST BP <80 MM HG: CPT | Performed by: INTERNAL MEDICINE

## 2024-12-26 PROCEDURE — 1159F MED LIST DOCD IN RCRD: CPT | Performed by: INTERNAL MEDICINE

## 2024-12-26 PROCEDURE — 1036F TOBACCO NON-USER: CPT | Performed by: INTERNAL MEDICINE

## 2024-12-26 RX ORDER — ATORVASTATIN CALCIUM 20 MG/1
20 TABLET, FILM COATED ORAL DAILY
Qty: 90 TABLET | Refills: 3 | Status: SHIPPED | OUTPATIENT
Start: 2024-12-26 | End: 2025-12-26

## 2024-12-26 NOTE — PROGRESS NOTES
Chief Complaint:   No chief complaint on file.     History Of Present Illness:    Blu Grigsby is a 84 y.o. male here for followup. He was hospitalized 6/17 with apparent flash pulmonary edema and type II MI. Has known CAD. Angiography revealed RCA  with R-R / L-R distal collaterals and ISR (90%) of a stent of the OM. He was medically managed. Echo 6/17 with EF 35-40% improved to 45-50% by TTE '2021 though noted moderate MR at that time. Known AAA s/p endovascular repair and follows with vascular.     Reports doing generally well presently. He is a poor historian given progressive dementia. His family reports a period of unresponsiveness earlier this year and they were concerned for a TIA. He recovered before arriving in the hospital and he ultimately was not evaluated by hospital staff. He had his statin dose stopped earlier for leg cramping and they elected to restart it but at every other day. They are not sure if his leg cramping improved while he was off the medication but not it doesn't seem to have changed on his present every other day regimen. Denies leg swelling as of late. Reports robust urine output with his present diuretic regimen.            Last Recorded Vitals:  Vitals:    12/26/24 1532   BP: 128/73   BP Location: Right arm   Patient Position: Sitting   Pulse: 72             Allergies:  Patient has no known allergies.    Outpatient Medications:  Current Outpatient Medications   Medication Instructions    aspirin 81 mg chewable tablet 1 tablet, Daily    carvedilol (COREG) 3.125 mg, oral, 2 times daily    donepezil (ARICEPT) 10 mg, oral, Every evening    furosemide (LASIX) 40 mg, oral, 2 times daily (morning and late afternoon)    lisinopril 20 mg, oral, 2 times daily    memantine (NAMENDA) 10 mg, oral, 2 times daily    mirtazapine (REMERON) 15 mg, oral, Nightly    multivitamin with folic acid (One Daily Multivitamin) 400 mcg tablet 1 tablet, Daily    vit A/vit C/vit E/zinc/copper (PRESERVISION  AREDS ORAL) 1 capsule, 2 times daily         Physical Exam:  Gen Well appearing elderly male sitting up in NAD. There is no height or weight on file to calculate BMI.   CV rrr. 4/6 SILVINA. No JVD or leg edema.  Pulm Lungs clear with normal respiratory effort.  Neuro Alert and conversant. Grossly nonfocal.     I reviewed the patient's ECG from 8/2024 - SR. PVC/s. PAC's. LBBB. Left axis deviation.        I reviewed most recent imaging / labs / and office notes as well as details of any recent hospitalizations or ED visits.    Assessment/Plan   1. Chronic combined systolic / diastolic HF  Euvolemic. His present regimen is to continue.      2. Cardiomyopathy, ischemic  EF 35-40%-->45-50% last check. His present regimen is to continue.      3. CAD  OM stent ISR. RCA  w/ left-right collaterals. Indefinite aspirin / statin. Changing his statin to 20 mg once daily. Exercise encouraged.     4. Mitral regurgitation  Moderate last check. We elected not to follow this further given that no invasive procedures will be pursued if / when the leakage becomes severe.       5. Hypertension  BP well controlled. His present regimen is to continue.        Follow-up 1 year        Bryan Reyes MD

## 2025-01-08 DIAGNOSIS — F03.C0 SEVERE DEMENTIA, UNSPECIFIED DEMENTIA TYPE, UNSPECIFIED WHETHER BEHAVIORAL, PSYCHOTIC, OR MOOD DISTURBANCE OR ANXIETY: ICD-10-CM

## 2025-01-08 RX ORDER — MIRTAZAPINE 15 MG/1
15 TABLET, FILM COATED ORAL NIGHTLY
Qty: 90 TABLET | Refills: 0 | Status: SHIPPED | OUTPATIENT
Start: 2025-01-08

## 2025-01-28 ENCOUNTER — APPOINTMENT (OUTPATIENT)
Dept: PRIMARY CARE | Facility: CLINIC | Age: 85
End: 2025-01-28
Payer: MEDICARE

## 2025-02-01 ENCOUNTER — HOSPITAL ENCOUNTER (INPATIENT)
Facility: HOSPITAL | Age: 85
End: 2025-02-01
Attending: FAMILY MEDICINE | Admitting: INTERNAL MEDICINE
Payer: MEDICARE

## 2025-02-01 ENCOUNTER — APPOINTMENT (OUTPATIENT)
Dept: RADIOLOGY | Facility: HOSPITAL | Age: 85
End: 2025-02-01
Payer: MEDICARE

## 2025-02-01 ENCOUNTER — APPOINTMENT (OUTPATIENT)
Dept: CARDIOLOGY | Facility: HOSPITAL | Age: 85
End: 2025-02-01
Payer: MEDICARE

## 2025-02-01 DIAGNOSIS — J10.1 INFLUENZA A: Primary | ICD-10-CM

## 2025-02-01 DIAGNOSIS — R09.02 HYPOXEMIA: ICD-10-CM

## 2025-02-01 DIAGNOSIS — R79.89 ELEVATED TROPONIN: ICD-10-CM

## 2025-02-01 DIAGNOSIS — J11.00 PNEUMONIA AND INFLUENZA: ICD-10-CM

## 2025-02-01 DIAGNOSIS — J34.2 NASAL SEPTAL DEVIATION: ICD-10-CM

## 2025-02-01 DIAGNOSIS — R26.9 GAIT DISTURBANCE: ICD-10-CM

## 2025-02-01 DIAGNOSIS — I48.91 ATRIAL FIBRILLATION WITH RAPID VENTRICULAR RESPONSE (MULTI): ICD-10-CM

## 2025-02-01 DIAGNOSIS — F03.C0 SEVERE DEMENTIA, UNSPECIFIED DEMENTIA TYPE, UNSPECIFIED WHETHER BEHAVIORAL, PSYCHOTIC, OR MOOD DISTURBANCE OR ANXIETY: ICD-10-CM

## 2025-02-01 DIAGNOSIS — I50.22 HEART FAILURE WITH MID-RANGE EJECTION FRACTION (HFMEF): ICD-10-CM

## 2025-02-01 LAB
ALBUMIN SERPL BCP-MCNC: 4.2 G/DL (ref 3.4–5)
ALP SERPL-CCNC: 137 U/L (ref 33–136)
ALT SERPL W P-5'-P-CCNC: 39 U/L (ref 10–52)
ANION GAP BLDA CALCULATED.4IONS-SCNC: 5 MMO/L (ref 10–25)
ANION GAP SERPL CALC-SCNC: 13 MMOL/L (ref 10–20)
APPEARANCE UR: ABNORMAL
APTT PPP: 33 SECONDS (ref 27–38)
ARTERIAL PATENCY WRIST A: POSITIVE
AST SERPL W P-5'-P-CCNC: 56 U/L (ref 9–39)
BACTERIA #/AREA URNS AUTO: ABNORMAL /HPF
BASE EXCESS BLDA CALC-SCNC: 5.7 MMOL/L (ref -2–3)
BASOPHILS # BLD AUTO: 0.05 X10*3/UL (ref 0–0.1)
BASOPHILS NFR BLD AUTO: 0.5 %
BILIRUB SERPL-MCNC: 1.2 MG/DL (ref 0–1.2)
BILIRUB UR STRIP.AUTO-MCNC: NEGATIVE MG/DL
BNP SERPL-MCNC: 1524 PG/ML (ref 0–99)
BODY TEMPERATURE: ABNORMAL
BUN SERPL-MCNC: 18 MG/DL (ref 6–23)
CA-I BLDA-SCNC: 1.18 MMOL/L (ref 1.1–1.33)
CALCIUM SERPL-MCNC: 9 MG/DL (ref 8.6–10.3)
CARDIAC TROPONIN I PNL SERPL HS: 54 NG/L (ref 0–20)
CARDIAC TROPONIN I PNL SERPL HS: 67 NG/L (ref 0–20)
CARDIAC TROPONIN I PNL SERPL HS: 67 NG/L (ref 0–20)
CHLORIDE BLDA-SCNC: 100 MMOL/L (ref 98–107)
CHLORIDE SERPL-SCNC: 98 MMOL/L (ref 98–107)
CO2 SERPL-SCNC: 31 MMOL/L (ref 21–32)
COLOR UR: YELLOW
CREAT SERPL-MCNC: 1.03 MG/DL (ref 0.5–1.3)
EGFRCR SERPLBLD CKD-EPI 2021: 72 ML/MIN/1.73M*2
EOSINOPHIL # BLD AUTO: 0.02 X10*3/UL (ref 0–0.4)
EOSINOPHIL NFR BLD AUTO: 0.2 %
ERYTHROCYTE [DISTWIDTH] IN BLOOD BY AUTOMATED COUNT: 16.7 % (ref 11.5–14.5)
FLUAV RNA RESP QL NAA+PROBE: DETECTED
FLUBV RNA RESP QL NAA+PROBE: NOT DETECTED
GLUCOSE BLDA-MCNC: 134 MG/DL (ref 74–99)
GLUCOSE SERPL-MCNC: 131 MG/DL (ref 74–99)
GLUCOSE UR STRIP.AUTO-MCNC: NORMAL MG/DL
HCO3 BLDA-SCNC: 32.5 MMOL/L (ref 22–26)
HCT VFR BLD AUTO: 48 % (ref 41–52)
HCT VFR BLD EST: 43 % (ref 41–52)
HGB BLD-MCNC: 14.9 G/DL (ref 13.5–17.5)
HGB BLDA-MCNC: 14.3 G/DL (ref 13.5–17.5)
HOLD SPECIMEN: NORMAL
HYALINE CASTS #/AREA URNS AUTO: ABNORMAL /LPF
IMM GRANULOCYTES # BLD AUTO: 0.06 X10*3/UL (ref 0–0.5)
IMM GRANULOCYTES NFR BLD AUTO: 0.5 % (ref 0–0.9)
INHALED O2 CONCENTRATION: 44 %
INR PPP: 1.4 (ref 0.9–1.1)
KETONES UR STRIP.AUTO-MCNC: NEGATIVE MG/DL
LACTATE BLDA-SCNC: 0.8 MMOL/L (ref 0.4–2)
LACTATE SERPL-SCNC: 0.8 MMOL/L (ref 0.4–2)
LEUKOCYTE ESTERASE UR QL STRIP.AUTO: NEGATIVE
LYMPHOCYTES # BLD AUTO: 0.43 X10*3/UL (ref 0.8–3)
LYMPHOCYTES NFR BLD AUTO: 3.9 %
MAGNESIUM SERPL-MCNC: 1.95 MG/DL (ref 1.6–2.4)
MCH RBC QN AUTO: 28.3 PG (ref 26–34)
MCHC RBC AUTO-ENTMCNC: 31 G/DL (ref 32–36)
MCV RBC AUTO: 91 FL (ref 80–100)
MONOCYTES # BLD AUTO: 0.84 X10*3/UL (ref 0.05–0.8)
MONOCYTES NFR BLD AUTO: 7.6 %
MRSA DNA SPEC QL NAA+PROBE: NOT DETECTED
MUCOUS THREADS #/AREA URNS AUTO: ABNORMAL /LPF
NEUTROPHILS # BLD AUTO: 9.67 X10*3/UL (ref 1.6–5.5)
NEUTROPHILS NFR BLD AUTO: 87.3 %
NITRITE UR QL STRIP.AUTO: NEGATIVE
NRBC BLD-RTO: 0 /100 WBCS (ref 0–0)
OXYHGB MFR BLDA: 93.4 % (ref 94–98)
PCO2 BLDA: 55 MM HG (ref 38–42)
PH BLDA: 7.38 PH (ref 7.38–7.42)
PH UR STRIP.AUTO: 5.5 [PH]
PLATELET # BLD AUTO: 152 X10*3/UL (ref 150–450)
PO2 BLDA: 80 MM HG (ref 85–95)
POTASSIUM BLDA-SCNC: 3.9 MMOL/L (ref 3.5–5.3)
POTASSIUM SERPL-SCNC: 4 MMOL/L (ref 3.5–5.3)
PROT SERPL-MCNC: 7.3 G/DL (ref 6.4–8.2)
PROT UR STRIP.AUTO-MCNC: ABNORMAL MG/DL
PROTHROMBIN TIME: 15.3 SECONDS (ref 9.8–12.8)
RBC # BLD AUTO: 5.27 X10*6/UL (ref 4.5–5.9)
RBC # UR STRIP.AUTO: NEGATIVE /UL
RBC #/AREA URNS AUTO: >20 /HPF
SAO2 % BLDA: 96 % (ref 94–100)
SARS-COV-2 RNA RESP QL NAA+PROBE: NOT DETECTED
SODIUM BLDA-SCNC: 134 MMOL/L (ref 136–145)
SODIUM SERPL-SCNC: 138 MMOL/L (ref 136–145)
SP GR UR STRIP.AUTO: 1.03
SPECIMEN DRAWN FROM PATIENT: ABNORMAL
SQUAMOUS #/AREA URNS AUTO: ABNORMAL /HPF
UROBILINOGEN UR STRIP.AUTO-MCNC: NORMAL MG/DL
WBC # BLD AUTO: 11.1 X10*3/UL (ref 4.4–11.3)
WBC #/AREA URNS AUTO: ABNORMAL /HPF

## 2025-02-01 PROCEDURE — 82435 ASSAY OF BLOOD CHLORIDE: CPT | Mod: 59 | Performed by: FAMILY MEDICINE

## 2025-02-01 PROCEDURE — 87641 MR-STAPH DNA AMP PROBE: CPT | Performed by: EMERGENCY MEDICINE

## 2025-02-01 PROCEDURE — 82810 BLOOD GASES O2 SAT ONLY: CPT | Mod: CCI | Performed by: FAMILY MEDICINE

## 2025-02-01 PROCEDURE — 74176 CT ABD & PELVIS W/O CONTRAST: CPT

## 2025-02-01 PROCEDURE — 93005 ELECTROCARDIOGRAM TRACING: CPT

## 2025-02-01 PROCEDURE — 85610 PROTHROMBIN TIME: CPT | Performed by: FAMILY MEDICINE

## 2025-02-01 PROCEDURE — 87040 BLOOD CULTURE FOR BACTERIA: CPT | Mod: GENLAB | Performed by: FAMILY MEDICINE

## 2025-02-01 PROCEDURE — 96366 THER/PROPH/DIAG IV INF ADDON: CPT

## 2025-02-01 PROCEDURE — 2500000005 HC RX 250 GENERAL PHARMACY W/O HCPCS: Performed by: FAMILY MEDICINE

## 2025-02-01 PROCEDURE — 94664 DEMO&/EVAL PT USE INHALER: CPT

## 2025-02-01 PROCEDURE — 36415 COLL VENOUS BLD VENIPUNCTURE: CPT | Performed by: FAMILY MEDICINE

## 2025-02-01 PROCEDURE — 74176 CT ABD & PELVIS W/O CONTRAST: CPT | Mod: FOREIGN READ | Performed by: RADIOLOGY

## 2025-02-01 PROCEDURE — 94640 AIRWAY INHALATION TREATMENT: CPT | Mod: MUE

## 2025-02-01 PROCEDURE — 1200000002 HC GENERAL ROOM WITH TELEMETRY DAILY: Mod: IPSPLIT

## 2025-02-01 PROCEDURE — 71250 CT THORAX DX C-: CPT | Mod: FOREIGN READ | Performed by: RADIOLOGY

## 2025-02-01 PROCEDURE — 94760 N-INVAS EAR/PLS OXIMETRY 1: CPT | Mod: IPSPLIT

## 2025-02-01 PROCEDURE — 94640 AIRWAY INHALATION TREATMENT: CPT

## 2025-02-01 PROCEDURE — 2500000004 HC RX 250 GENERAL PHARMACY W/ HCPCS (ALT 636 FOR OP/ED): Performed by: FAMILY MEDICINE

## 2025-02-01 PROCEDURE — 83605 ASSAY OF LACTIC ACID: CPT | Performed by: FAMILY MEDICINE

## 2025-02-01 PROCEDURE — 83880 ASSAY OF NATRIURETIC PEPTIDE: CPT | Performed by: FAMILY MEDICINE

## 2025-02-01 PROCEDURE — 84484 ASSAY OF TROPONIN QUANT: CPT | Performed by: FAMILY MEDICINE

## 2025-02-01 PROCEDURE — 2500000001 HC RX 250 WO HCPCS SELF ADMINISTERED DRUGS (ALT 637 FOR MEDICARE OP): Performed by: EMERGENCY MEDICINE

## 2025-02-01 PROCEDURE — 2500000004 HC RX 250 GENERAL PHARMACY W/ HCPCS (ALT 636 FOR OP/ED)

## 2025-02-01 PROCEDURE — 71045 X-RAY EXAM CHEST 1 VIEW: CPT

## 2025-02-01 PROCEDURE — 96375 TX/PRO/DX INJ NEW DRUG ADDON: CPT

## 2025-02-01 PROCEDURE — 85730 THROMBOPLASTIN TIME PARTIAL: CPT | Performed by: FAMILY MEDICINE

## 2025-02-01 PROCEDURE — 81001 URINALYSIS AUTO W/SCOPE: CPT | Performed by: FAMILY MEDICINE

## 2025-02-01 PROCEDURE — 2500000004 HC RX 250 GENERAL PHARMACY W/ HCPCS (ALT 636 FOR OP/ED): Mod: IPSPLIT | Performed by: EMERGENCY MEDICINE

## 2025-02-01 PROCEDURE — 71250 CT THORAX DX C-: CPT

## 2025-02-01 PROCEDURE — 2500000004 HC RX 250 GENERAL PHARMACY W/ HCPCS (ALT 636 FOR OP/ED): Mod: IPSPLIT

## 2025-02-01 PROCEDURE — 2500000002 HC RX 250 W HCPCS SELF ADMINISTERED DRUGS (ALT 637 FOR MEDICARE OP, ALT 636 FOR OP/ED): Performed by: EMERGENCY MEDICINE

## 2025-02-01 PROCEDURE — 2500000002 HC RX 250 W HCPCS SELF ADMINISTERED DRUGS (ALT 637 FOR MEDICARE OP, ALT 636 FOR OP/ED): Mod: IPSPLIT

## 2025-02-01 PROCEDURE — 70450 CT HEAD/BRAIN W/O DYE: CPT | Performed by: RADIOLOGY

## 2025-02-01 PROCEDURE — 70450 CT HEAD/BRAIN W/O DYE: CPT

## 2025-02-01 PROCEDURE — 84145 PROCALCITONIN (PCT): CPT | Mod: GENLAB

## 2025-02-01 PROCEDURE — 85025 COMPLETE CBC W/AUTO DIFF WBC: CPT | Performed by: FAMILY MEDICINE

## 2025-02-01 PROCEDURE — 84484 ASSAY OF TROPONIN QUANT: CPT | Mod: IPSPLIT

## 2025-02-01 PROCEDURE — 87636 SARSCOV2 & INF A&B AMP PRB: CPT | Performed by: FAMILY MEDICINE

## 2025-02-01 PROCEDURE — 99285 EMERGENCY DEPT VISIT HI MDM: CPT | Mod: 25 | Performed by: FAMILY MEDICINE

## 2025-02-01 PROCEDURE — 96365 THER/PROPH/DIAG IV INF INIT: CPT

## 2025-02-01 PROCEDURE — 83735 ASSAY OF MAGNESIUM: CPT | Performed by: FAMILY MEDICINE

## 2025-02-01 PROCEDURE — 36600 WITHDRAWAL OF ARTERIAL BLOOD: CPT

## 2025-02-01 RX ORDER — CEFTRIAXONE 1 G/50ML
1 INJECTION, SOLUTION INTRAVENOUS EVERY 24 HOURS
Status: DISCONTINUED | OUTPATIENT
Start: 2025-02-01 | End: 2025-02-03

## 2025-02-01 RX ORDER — KETOROLAC TROMETHAMINE 15 MG/ML
INJECTION, SOLUTION INTRAMUSCULAR; INTRAVENOUS
Status: COMPLETED
Start: 2025-02-01 | End: 2025-02-01

## 2025-02-01 RX ORDER — GUAIFENESIN/DEXTROMETHORPHAN 100-10MG/5
5 SYRUP ORAL EVERY 4 HOURS PRN
Status: DISCONTINUED | OUTPATIENT
Start: 2025-02-01 | End: 2025-02-04 | Stop reason: HOSPADM

## 2025-02-01 RX ORDER — GUAIFENESIN 600 MG/1
600 TABLET, EXTENDED RELEASE ORAL EVERY 12 HOURS PRN
Status: DISCONTINUED | OUTPATIENT
Start: 2025-02-01 | End: 2025-02-04 | Stop reason: HOSPADM

## 2025-02-01 RX ORDER — ACETAMINOPHEN 325 MG/1
650 TABLET ORAL EVERY 4 HOURS PRN
Status: DISCONTINUED | OUTPATIENT
Start: 2025-02-01 | End: 2025-02-04 | Stop reason: HOSPADM

## 2025-02-01 RX ORDER — KETOROLAC TROMETHAMINE 15 MG/ML
15 INJECTION, SOLUTION INTRAMUSCULAR; INTRAVENOUS ONCE
Status: COMPLETED | OUTPATIENT
Start: 2025-02-01 | End: 2025-02-01

## 2025-02-01 RX ORDER — ACETAMINOPHEN 650 MG/1
650 SUPPOSITORY RECTAL EVERY 4 HOURS PRN
Status: DISCONTINUED | OUTPATIENT
Start: 2025-02-01 | End: 2025-02-02

## 2025-02-01 RX ORDER — PANTOPRAZOLE SODIUM 40 MG/1
40 TABLET, DELAYED RELEASE ORAL
Status: DISCONTINUED | OUTPATIENT
Start: 2025-02-02 | End: 2025-02-04 | Stop reason: HOSPADM

## 2025-02-01 RX ORDER — ACETAMINOPHEN 650 MG/1
650 SUPPOSITORY RECTAL ONCE
Status: COMPLETED | OUTPATIENT
Start: 2025-02-01 | End: 2025-02-01

## 2025-02-01 RX ORDER — ENOXAPARIN SODIUM 100 MG/ML
40 INJECTION SUBCUTANEOUS EVERY 24 HOURS
Status: DISCONTINUED | OUTPATIENT
Start: 2025-02-01 | End: 2025-02-02

## 2025-02-01 RX ORDER — PANTOPRAZOLE SODIUM 40 MG/10ML
40 INJECTION, POWDER, LYOPHILIZED, FOR SOLUTION INTRAVENOUS
Status: DISCONTINUED | OUTPATIENT
Start: 2025-02-02 | End: 2025-02-03

## 2025-02-01 RX ORDER — VANCOMYCIN HYDROCHLORIDE 1 G/200ML
1000 INJECTION, SOLUTION INTRAVENOUS ONCE
Status: COMPLETED | OUTPATIENT
Start: 2025-02-01 | End: 2025-02-01

## 2025-02-01 RX ORDER — ONDANSETRON 4 MG/1
4 TABLET, ORALLY DISINTEGRATING ORAL EVERY 8 HOURS PRN
Status: DISCONTINUED | OUTPATIENT
Start: 2025-02-01 | End: 2025-02-04 | Stop reason: HOSPADM

## 2025-02-01 RX ORDER — OSELTAMIVIR PHOSPHATE 75 MG/1
75 CAPSULE ORAL 2 TIMES DAILY
Status: DISCONTINUED | OUTPATIENT
Start: 2025-02-01 | End: 2025-02-04 | Stop reason: HOSPADM

## 2025-02-01 RX ORDER — DOXYCYCLINE 100 MG/10ML
INJECTION, POWDER, LYOPHILIZED, FOR SOLUTION INTRAVENOUS
Status: COMPLETED
Start: 2025-02-01 | End: 2025-02-01

## 2025-02-01 RX ORDER — TALC
6 POWDER (GRAM) TOPICAL NIGHTLY PRN
Status: DISCONTINUED | OUTPATIENT
Start: 2025-02-01 | End: 2025-02-04 | Stop reason: HOSPADM

## 2025-02-01 RX ORDER — OSELTAMIVIR PHOSPHATE 30 MG/1
30 CAPSULE ORAL ONCE
Status: DISCONTINUED | OUTPATIENT
Start: 2025-02-01 | End: 2025-02-01

## 2025-02-01 RX ORDER — IPRATROPIUM BROMIDE AND ALBUTEROL SULFATE 2.5; .5 MG/3ML; MG/3ML
3 SOLUTION RESPIRATORY (INHALATION) ONCE
Status: COMPLETED | OUTPATIENT
Start: 2025-02-01 | End: 2025-02-01

## 2025-02-01 RX ORDER — POLYETHYLENE GLYCOL 3350 17 G/17G
17 POWDER, FOR SOLUTION ORAL DAILY
Status: DISCONTINUED | OUTPATIENT
Start: 2025-02-01 | End: 2025-02-04 | Stop reason: HOSPADM

## 2025-02-01 RX ORDER — ACETAMINOPHEN 160 MG/5ML
650 SOLUTION ORAL EVERY 4 HOURS PRN
Status: DISCONTINUED | OUTPATIENT
Start: 2025-02-01 | End: 2025-02-02

## 2025-02-01 RX ORDER — ONDANSETRON HYDROCHLORIDE 2 MG/ML
4 INJECTION, SOLUTION INTRAVENOUS EVERY 8 HOURS PRN
Status: DISCONTINUED | OUTPATIENT
Start: 2025-02-01 | End: 2025-02-04 | Stop reason: HOSPADM

## 2025-02-01 RX ORDER — DEXTROSE MONOHYDRATE 50 MG/ML
INJECTION, SOLUTION INTRAVENOUS
Status: COMPLETED
Start: 2025-02-01 | End: 2025-02-01

## 2025-02-01 RX ADMIN — CEFTRIAXONE 1 G: 1 INJECTION, SOLUTION INTRAVENOUS at 18:38

## 2025-02-01 RX ADMIN — DEXTROSE: 50 INJECTION, SOLUTION INTRAVENOUS at 23:19

## 2025-02-01 RX ADMIN — ACETAMINOPHEN 650 MG: 650 SUPPOSITORY RECTAL at 11:51

## 2025-02-01 RX ADMIN — OSELTAMIVIR PHOSPHATE 75 MG: 75 CAPSULE ORAL at 21:00

## 2025-02-01 RX ADMIN — PIPERACILLIN SODIUM AND TAZOBACTAM SODIUM 3.38 G: 3; .375 INJECTION, SOLUTION INTRAVENOUS at 13:03

## 2025-02-01 RX ADMIN — IPRATROPIUM BROMIDE AND ALBUTEROL SULFATE 3 ML: .5; 3 SOLUTION RESPIRATORY (INHALATION) at 08:35

## 2025-02-01 RX ADMIN — KETOROLAC TROMETHAMINE 15 MG: 15 INJECTION, SOLUTION INTRAMUSCULAR; INTRAVENOUS at 07:57

## 2025-02-01 RX ADMIN — PIPERACILLIN SODIUM AND TAZOBACTAM SODIUM 3.38 G: 3; .375 INJECTION, SOLUTION INTRAVENOUS at 05:14

## 2025-02-01 RX ADMIN — VANCOMYCIN HYDROCHLORIDE 1000 MG: 1 INJECTION, SOLUTION INTRAVENOUS at 06:27

## 2025-02-01 RX ADMIN — Medication 6 L/MIN: at 03:53

## 2025-02-01 RX ADMIN — ENOXAPARIN SODIUM 40 MG: 40 INJECTION SUBCUTANEOUS at 21:08

## 2025-02-01 RX ADMIN — PIPERACILLIN SODIUM AND TAZOBACTAM SODIUM 3.38 G: 3; .375 INJECTION, SOLUTION INTRAVENOUS at 21:09

## 2025-02-01 RX ADMIN — Medication 4 L/MIN: at 08:49

## 2025-02-01 RX ADMIN — DOXYCYCLINE: 100 INJECTION, POWDER, LYOPHILIZED, FOR SOLUTION INTRAVENOUS at 23:23

## 2025-02-01 RX ADMIN — DOXYCYCLINE 100 MG: 100 INJECTION, POWDER, LYOPHILIZED, FOR SOLUTION INTRAVENOUS at 21:02

## 2025-02-01 SDOH — SOCIAL STABILITY: SOCIAL INSECURITY
WITHIN THE LAST YEAR, HAVE YOU BEEN RAPED OR FORCED TO HAVE ANY KIND OF SEXUAL ACTIVITY BY YOUR PARTNER OR EX-PARTNER?: PATIENT UNABLE TO ANSWER

## 2025-02-01 SDOH — SOCIAL STABILITY: SOCIAL INSECURITY: HAVE YOU HAD THOUGHTS OF HARMING ANYONE ELSE?: UNABLE TO ASSESS

## 2025-02-01 SDOH — SOCIAL STABILITY: SOCIAL INSECURITY: DO YOU FEEL UNSAFE GOING BACK TO THE PLACE WHERE YOU ARE LIVING?: UNABLE TO ASSESS

## 2025-02-01 SDOH — ECONOMIC STABILITY: FOOD INSECURITY
WITHIN THE PAST 12 MONTHS, YOU WORRIED THAT YOUR FOOD WOULD RUN OUT BEFORE YOU GOT THE MONEY TO BUY MORE.: PATIENT UNABLE TO ANSWER

## 2025-02-01 SDOH — SOCIAL STABILITY: SOCIAL INSECURITY: HAS ANYONE EVER THREATENED TO HURT YOUR FAMILY OR YOUR PETS?: UNABLE TO ASSESS

## 2025-02-01 SDOH — SOCIAL STABILITY: SOCIAL INSECURITY: ARE YOU OR HAVE YOU BEEN THREATENED OR ABUSED PHYSICALLY, EMOTIONALLY, OR SEXUALLY BY ANYONE?: UNABLE TO ASSESS

## 2025-02-01 SDOH — SOCIAL STABILITY: SOCIAL INSECURITY: HAVE YOU HAD ANY THOUGHTS OF HARMING ANYONE ELSE?: UNABLE TO ASSESS

## 2025-02-01 SDOH — SOCIAL STABILITY: SOCIAL INSECURITY: ABUSE: ADULT

## 2025-02-01 SDOH — SOCIAL STABILITY: SOCIAL INSECURITY: ARE THERE ANY APPARENT SIGNS OF INJURIES/BEHAVIORS THAT COULD BE RELATED TO ABUSE/NEGLECT?: UNABLE TO ASSESS

## 2025-02-01 SDOH — ECONOMIC STABILITY: TRANSPORTATION INSECURITY
IN THE PAST 12 MONTHS, HAS LACK OF TRANSPORTATION KEPT YOU FROM MEDICAL APPOINTMENTS OR FROM GETTING MEDICATIONS?: PATIENT UNABLE TO ANSWER

## 2025-02-01 SDOH — ECONOMIC STABILITY: HOUSING INSECURITY
IN THE LAST 12 MONTHS, WAS THERE A TIME WHEN YOU WERE NOT ABLE TO PAY THE MORTGAGE OR RENT ON TIME?: PATIENT UNABLE TO ANSWER

## 2025-02-01 SDOH — ECONOMIC STABILITY: FOOD INSECURITY
HOW HARD IS IT FOR YOU TO PAY FOR THE VERY BASICS LIKE FOOD, HOUSING, MEDICAL CARE, AND HEATING?: PATIENT UNABLE TO ANSWER

## 2025-02-01 SDOH — ECONOMIC STABILITY: HOUSING INSECURITY: AT ANY TIME IN THE PAST 12 MONTHS, WERE YOU HOMELESS OR LIVING IN A SHELTER (INCLUDING NOW)?: PATIENT UNABLE TO ANSWER

## 2025-02-01 SDOH — ECONOMIC STABILITY: FOOD INSECURITY
WITHIN THE PAST 12 MONTHS, THE FOOD YOU BOUGHT JUST DIDN'T LAST AND YOU DIDN'T HAVE MONEY TO GET MORE.: PATIENT UNABLE TO ANSWER

## 2025-02-01 SDOH — SOCIAL STABILITY: SOCIAL INSECURITY: DO YOU FEEL ANYONE HAS EXPLOITED OR TAKEN ADVANTAGE OF YOU FINANCIALLY OR OF YOUR PERSONAL PROPERTY?: UNABLE TO ASSESS

## 2025-02-01 SDOH — ECONOMIC STABILITY: INCOME INSECURITY
IN THE PAST 12 MONTHS HAS THE ELECTRIC, GAS, OIL, OR WATER COMPANY THREATENED TO SHUT OFF SERVICES IN YOUR HOME?: PATIENT UNABLE TO ANSWER

## 2025-02-01 SDOH — SOCIAL STABILITY: SOCIAL INSECURITY: WERE YOU ABLE TO COMPLETE ALL THE BEHAVIORAL HEALTH SCREENINGS?: NO

## 2025-02-01 SDOH — ECONOMIC STABILITY: HOUSING INSECURITY: IN THE PAST 12 MONTHS, HOW MANY TIMES HAVE YOU MOVED WHERE YOU WERE LIVING?: 0

## 2025-02-01 SDOH — SOCIAL STABILITY: SOCIAL INSECURITY: DOES ANYONE TRY TO KEEP YOU FROM HAVING/CONTACTING OTHER FRIENDS OR DOING THINGS OUTSIDE YOUR HOME?: UNABLE TO ASSESS

## 2025-02-01 SDOH — SOCIAL STABILITY: SOCIAL INSECURITY: WITHIN THE LAST YEAR, HAVE YOU BEEN AFRAID OF YOUR PARTNER OR EX-PARTNER?: PATIENT UNABLE TO ANSWER

## 2025-02-01 SDOH — SOCIAL STABILITY: SOCIAL INSECURITY
WITHIN THE LAST YEAR, HAVE YOU BEEN KICKED, HIT, SLAPPED, OR OTHERWISE PHYSICALLY HURT BY YOUR PARTNER OR EX-PARTNER?: PATIENT UNABLE TO ANSWER

## 2025-02-01 SDOH — SOCIAL STABILITY: SOCIAL INSECURITY
WITHIN THE LAST YEAR, HAVE YOU BEEN HUMILIATED OR EMOTIONALLY ABUSED IN OTHER WAYS BY YOUR PARTNER OR EX-PARTNER?: PATIENT UNABLE TO ANSWER

## 2025-02-01 ASSESSMENT — COLUMBIA-SUICIDE SEVERITY RATING SCALE - C-SSRS
1. IN THE PAST MONTH, HAVE YOU WISHED YOU WERE DEAD OR WISHED YOU COULD GO TO SLEEP AND NOT WAKE UP?: NO
2. HAVE YOU ACTUALLY HAD ANY THOUGHTS OF KILLING YOURSELF?: NO
6. HAVE YOU EVER DONE ANYTHING, STARTED TO DO ANYTHING, OR PREPARED TO DO ANYTHING TO END YOUR LIFE?: NO

## 2025-02-01 ASSESSMENT — PAIN - FUNCTIONAL ASSESSMENT
PAIN_FUNCTIONAL_ASSESSMENT: 0-10
PAIN_FUNCTIONAL_ASSESSMENT: UNABLE TO SELF-REPORT

## 2025-02-01 ASSESSMENT — COGNITIVE AND FUNCTIONAL STATUS - GENERAL: PATIENT BASELINE BEDBOUND: YES

## 2025-02-01 ASSESSMENT — ACTIVITIES OF DAILY LIVING (ADL)
FEEDING YOURSELF: DEPENDENT
HEARING - RIGHT EAR: UNABLE TO ASSESS
WALKS IN HOME: DEPENDENT
DRESSING YOURSELF: DEPENDENT
GROOMING: DEPENDENT
HEARING - LEFT EAR: UNABLE TO ASSESS
LACK_OF_TRANSPORTATION: PATIENT UNABLE TO ANSWER
BATHING: DEPENDENT
JUDGMENT_ADEQUATE_SAFELY_COMPLETE_DAILY_ACTIVITIES: NO
TOILETING: DEPENDENT
ADEQUATE_TO_COMPLETE_ADL: NO
PATIENT'S MEMORY ADEQUATE TO SAFELY COMPLETE DAILY ACTIVITIES?: NO

## 2025-02-01 ASSESSMENT — PAIN SCALES - GENERAL
PAINLEVEL_OUTOF10: 0 - NO PAIN

## 2025-02-01 ASSESSMENT — LIFESTYLE VARIABLES
AUDIT-C TOTAL SCORE: -1
HOW MANY STANDARD DRINKS CONTAINING ALCOHOL DO YOU HAVE ON A TYPICAL DAY: PATIENT UNABLE TO ANSWER
AUDIT-C TOTAL SCORE: -1
HOW OFTEN DO YOU HAVE A DRINK CONTAINING ALCOHOL: PATIENT UNABLE TO ANSWER
HOW OFTEN DO YOU HAVE 6 OR MORE DRINKS ON ONE OCCASION: PATIENT UNABLE TO ANSWER
SKIP TO QUESTIONS 9-10: 0

## 2025-02-01 NOTE — ED PROVIDER NOTES
HPI   No chief complaint on file.      HPI  84-year-old male patient was sent to the emergency room complaint shortness of breath.  Patient has history of abdominal aortic aneurysm CHF dementia hyperlipidemia hypertension and multiple other comorbidities.      Patient History   Past Medical History:   Diagnosis Date   • Abdominal aortic aneurysm (AAA) (CMS-HCC)    • CHF (congestive heart failure)    • Dementia    • HLD (hyperlipidemia)    • HTN (hypertension)    • Personal history of other diseases of the circulatory system 10/11/2017    History of abdominal aortic aneurysm (AAA)   • Personal history of other diseases of the circulatory system     History of hypertension   • Personal history of other diseases of the circulatory system     History of congestive heart failure     Past Surgical History:   Procedure Laterality Date   • CARDIAC SURGERY  2017    Heart Surgery   • CT ABDOMEN ANGIOGRAM W AND/OR WO IV CONTRAST  2014    CT ABDOMEN ANGIOGRAM W AND/OR WO IV CONTRAST 2014 [facility]   • CT ABDOMEN ANGIOGRAM W AND/OR WO IV CONTRAST  2017    CT ABDOMEN ANGIOGRAM W AND/OR WO IV CONTRAST 2017 [facility]   • CT ABDOMEN PELVIS ANGIOGRAM W AND/OR WO IV CONTRAST  6/10/2013    CT ABDOMEN PELVIS ANGIOGRAM W AND/OR WO IV CONTRAST 6/10/2013 [facility]   • CT ABDOMEN PELVIS ANGIOGRAM W AND/OR WO IV CONTRAST  2012    CT ABDOMEN PELVIS ANGIOGRAM W AND/OR WO IV CONTRAST 2012 [facility]   • OTHER SURGICAL HISTORY  2017    Abdominal Surgery   • OTHER SURGICAL HISTORY  2017    Endovascular Repair Of Abdominal Aorta Aneurysm     No family history on file.  Social History     Tobacco Use   • Smoking status: Former     Current packs/day: 0.00     Average packs/day: 1 pack/day for 35.0 years (35.0 ttl pk-yrs)     Types: Cigarettes     Start date:      Quit date:      Years since quittin.1   • Smokeless tobacco: Never   Vaping Use   •  Vaping status: Never Used   Substance Use Topics   • Alcohol use: Not Currently   • Drug use: Never   EKG obtained at 354 hours shows sinus rhythm with PACs rate of 97.  Left axis deviation.  Left bundle branch block.  Abnormal EKG VT at this EKG.  No STEMI.  When compared with the prior old EKG multiple EKGs left bundle branch block was reported.  Abnormal EKG.  I read this EKG      Repeat EKG done at 535 hours show sinus rhythm with PACs rate of 81.  Left axis deviation.  Left bundle branch block.  No ST-T evaluation.  Abnormal EKG.  When compared with prior EKG left bundle branch block but demonstrated prior EKGs.  Abnormal EKG.  I read this EKG.  Physical Exam   ED Triage Vitals   Temp Pulse Resp BP   -- -- -- --      SpO2 Temp src Heart Rate Source Patient Position   -- -- -- --      BP Location FiO2 (%)     -- --       Physical Exam      ED Course & MDM                  No data recorded                                 Medical Decision Making      Procedure  Procedures   rebound rigidity.  No rashes petechiae ecchymosis or red streak.  Good motion both upper lower extremities no rash or petechiae ecchymosis or red streak.  Cranial nerves II through XII gross intact.  However difficult to evaluate patient due to patient generalized fatigue and weakness and lethargy   HENT:      Head: Normocephalic and atraumatic.      Right Ear: External ear normal.      Left Ear: External ear normal.      Nose: Nose normal. No congestion or rhinorrhea.   Eyes:      Extraocular Movements: Extraocular movements intact.      Conjunctiva/sclera: Conjunctivae normal.      Pupils: Pupils are equal, round, and reactive to light.   Cardiovascular:      Rate and Rhythm: Normal rate and regular rhythm.      Pulses: Normal pulses.      Heart sounds: Normal heart sounds.   Pulmonary:      Effort: Pulmonary effort is normal.      Breath sounds: Examination of the right-upper field reveals rhonchi. Examination of the left-upper field reveals rhonchi. Examination of the right-middle field reveals rhonchi. Examination of the left-middle field reveals rhonchi. Examination of the right-lower field reveals rales. Examination of the left-lower field reveals rales. Decreased breath sounds, rhonchi and rales present.   Chest:      Chest wall: Crepitus present. No tenderness or edema.   Abdominal:      General: Abdomen is flat. Bowel sounds are normal.      Palpations: Abdomen is soft. There is no hepatomegaly, splenomegaly or mass.      Tenderness: There is no abdominal tenderness. There is no guarding or rebound.   Musculoskeletal:         General: Normal range of motion.      Cervical back: Normal range of motion and neck supple.      Right lower leg: No tenderness.      Left lower leg: No tenderness.   Skin:     General: Skin is warm.      Capillary Refill: Capillary refill takes less than 2 seconds.      Coloration: Skin is not cyanotic or pale.      Findings: No ecchymosis, erythema or rash.      Nails: There is no  clubbing.   Neurological:      General: No focal deficit present.      Mental Status: He is alert and oriented to person, place, and time.      Cranial Nerves: No cranial nerve deficit.      Motor: No weakness.      Comments: Generalized weakness   Psychiatric:         Mood and Affect: Mood normal.         Behavior: Behavior normal.           ED Course & MDM   Diagnoses as of 02/26/25 0146   Influenza A   Elevated troponin   Pneumonia and influenza   Hypoxemia     Elderly male patient with history of multiple comorbidities including coronary artery disease CHF aortic aneurysm dementia hypertension sent to the ER for evaluation due to generalized fatigue.  Has had cough and congestion and suspect history of fever.  Upon examination was elderly sick looking male patient who has underlying dementia but able to cooperate.  Noted to have cough with some crackles in the lung bases.  No drooping or drooling stridor noted.  No definite hypoxemia trace edema calf ulcer not Homans' sign negative.  Heart regular rate and rhythm vascular abdomen soft nontender.  Neuro examination limited but no nuchal use no facial drooping or drooling.    Patient troponin was elevated 6554 54 and 67 respectively.  Urinalysis was negative.  Showed some protein leukocyte and nitrate negative more than 20 RBC 2+ bacteria.  Patient COVID-19 test was negative however patient was noted to influenza A infection.  Patient BNP was 1524.  His white count 11,000 and H&H is 14 and 48 with some left shift.    ABG showed pH of 7.38 pCO2 of 55 pO2 was 80.  Chemistry glucose 131 otherwise rest of chemistry is normal.    CT of the head was obtained due to patient dementia unable to provide detailed history and lethargy and weakness, showed remote infarct no acute stroke see CT report for details.      Chest x-ray showed cardiomegaly with vascular congestion and pleural effusion and CT of the chest abdomen pelvis was obtained due to patient limited information  suspect patient has pneumonia which was confirmed on CT basis as described in CT report and discussed in detail.  Patient had bilateral inflammatory a infectious nodular infiltrate and other finding on chest CT and CT abdomen pelvis showed abdominal aortic aneurysm measuring 5.2 cm without rupture ,see CT report for details.        This finding were discussed with the patient and family and it was decided to admit the patient.  Patient admitted for continuation of treatment close observation in the hospital.  Patient family agreed        Medical Decision Making      Procedure  Procedures     Mariann Musa MD  02/26/25 0157

## 2025-02-01 NOTE — ED TRIAGE NOTES
Per EMS patient SOB all day, 3LPM O2 NC at baseline, upon EMS arrival patient 89%, improved to 94% after Albuterol treatment, patient has a h/o dementia and a previous stroke, per EMS per family patient at baseline.

## 2025-02-01 NOTE — ED PROVIDER NOTES
[facility]  Department of Emergency Medicine   ED  Provider Note  2/1/2025  4:04 AM  AC03/AC03                  Blu Grigsby was signed out by Dr. Musa at shift change pending results    History of Present Illness:   Blu Grigsby is a 84 y.o. male presenting to the ED for upper respiratory symptoms and fatigue, beginning yesterday.  The complaint has been persistent, severe in severity, and worsened by nothing.  Patient has had cough cold and fatigue symptoms since yesterday.  He is been sleeping more than usual.  He is DNR Comfort Care arrest.  He is not on home oxygen.  His O2 sats initially were low but improved with nasal cannula as current saturation is 96%.  Patient unable to give further history.  Time prefers the patient to be admitted here so they can have a family member with him to help with his dementia.      Review of Systems:   Pertinent positives and review of systems as noted above.  Remaining 10 review of systems is negative or noncontributory to today's episode of care.        --------------------------------------------- PAST HISTORY ---------------------------------------------  Past Medical History:  has a past medical history of Abdominal aortic aneurysm (AAA) (CMS-HCC), CHF (congestive heart failure), Dementia, HLD (hyperlipidemia), HTN (hypertension), Personal history of other diseases of the circulatory system (10/11/2017), Personal history of other diseases of the circulatory system, and Personal history of other diseases of the circulatory system.    Past Surgical History:  has a past surgical history that includes Other surgical history (07/07/2017); Cardiac surgery (07/07/2017); Other surgical history (08/28/2017); CT angio abdomen pelvis w and or wo IV IV contrast (6/10/2013); CT angio abdomen w and or wo IV IV contrast (6/23/2014); CT angio abdomen w and or wo IV IV contrast (9/6/2017); and CT angio abdomen pelvis w and or wo IV IV contrast (12/17/2012).    Social History:   reports that he quit smoking about 27 years ago. His smoking use included cigarettes. He started smoking about 62 years ago. He has a 35 pack-year smoking history. He has never used smokeless tobacco. He reports that he does not currently use alcohol. He reports that he does not use drugs.    Family History: family history is not on file. Unless otherwise noted, family history is non contributory    The patient’s home medications have been reviewed.    Allergies: Patient has no known allergies.    -------------------------------------------------- RESULTS -------------------------------------------------  All laboratory and radiology results have been personally reviewed by myself   LABS:  Results for orders placed or performed during the hospital encounter of 02/01/25   Blood Gas Arterial Full Panel    Collection Time: 02/01/25  4:00 AM   Result Value Ref Range    POCT pH, Arterial 7.38 7.38 - 7.42 pH    POCT pCO2, Arterial 55 (H) 38 - 42 mm Hg    POCT pO2, Arterial 80 (L) 85 - 95 mm Hg    POCT SO2, Arterial 96 94 - 100 %    POCT Oxy Hemoglobin, Arterial 93.4 (L) 94.0 - 98.0 %    POCT Hematocrit Calculated, Arterial 43.0 41.0 - 52.0 %    POCT Sodium, Arterial 134 (L) 136 - 145 mmol/L    POCT Potassium, Arterial 3.9 3.5 - 5.3 mmol/L    POCT Chloride, Arterial 100 98 - 107 mmol/L    POCT Ionized Calcium, Arterial 1.18 1.10 - 1.33 mmol/L    POCT Glucose, Arterial 134 (H) 74 - 99 mg/dL    POCT Lactate, Arterial 0.8 0.4 - 2.0 mmol/L    POCT Base Excess, Arterial 5.7 (H) -2.0 - 3.0 mmol/L    POCT HCO3 Calculated, Arterial 32.5 (H) 22.0 - 26.0 mmol/L    POCT Hemoglobin, Arterial 14.3 13.5 - 17.5 g/dL    POCT Anion Gap, Arterial 5 (L) 10 - 25 mmo/L    Patient Temperature      FiO2 44 %    Site of Arterial Puncture Radial Right     Imtiaz's Test Positive    CBC and Auto Differential    Collection Time: 02/01/25  4:22 AM   Result Value Ref Range    WBC 11.1 4.4 - 11.3 x10*3/uL    nRBC 0.0 0.0 - 0.0 /100 WBCs    RBC 5.27 4.50  - 5.90 x10*6/uL    Hemoglobin 14.9 13.5 - 17.5 g/dL    Hematocrit 48.0 41.0 - 52.0 %    MCV 91 80 - 100 fL    MCH 28.3 26.0 - 34.0 pg    MCHC 31.0 (L) 32.0 - 36.0 g/dL    RDW 16.7 (H) 11.5 - 14.5 %    Platelets 152 150 - 450 x10*3/uL    Neutrophils % 87.3 40.0 - 80.0 %    Immature Granulocytes %, Automated 0.5 0.0 - 0.9 %    Lymphocytes % 3.9 13.0 - 44.0 %    Monocytes % 7.6 2.0 - 10.0 %    Eosinophils % 0.2 0.0 - 6.0 %    Basophils % 0.5 0.0 - 2.0 %    Neutrophils Absolute 9.67 (H) 1.60 - 5.50 x10*3/uL    Immature Granulocytes Absolute, Automated 0.06 0.00 - 0.50 x10*3/uL    Lymphocytes Absolute 0.43 (L) 0.80 - 3.00 x10*3/uL    Monocytes Absolute 0.84 (H) 0.05 - 0.80 x10*3/uL    Eosinophils Absolute 0.02 0.00 - 0.40 x10*3/uL    Basophils Absolute 0.05 0.00 - 0.10 x10*3/uL   Comprehensive Metabolic Panel    Collection Time: 02/01/25  4:22 AM   Result Value Ref Range    Glucose 131 (H) 74 - 99 mg/dL    Sodium 138 136 - 145 mmol/L    Potassium 4.0 3.5 - 5.3 mmol/L    Chloride 98 98 - 107 mmol/L    Bicarbonate 31 21 - 32 mmol/L    Anion Gap 13 10 - 20 mmol/L    Urea Nitrogen 18 6 - 23 mg/dL    Creatinine 1.03 0.50 - 1.30 mg/dL    eGFR 72 >60 mL/min/1.73m*2    Calcium 9.0 8.6 - 10.3 mg/dL    Albumin 4.2 3.4 - 5.0 g/dL    Alkaline Phosphatase 137 (H) 33 - 136 U/L    Total Protein 7.3 6.4 - 8.2 g/dL    AST 56 (H) 9 - 39 U/L    Bilirubin, Total 1.2 0.0 - 1.2 mg/dL    ALT 39 10 - 52 U/L   Magnesium    Collection Time: 02/01/25  4:22 AM   Result Value Ref Range    Magnesium 1.95 1.60 - 2.40 mg/dL   Troponin I, High Sensitivity, Initial    Collection Time: 02/01/25  4:22 AM   Result Value Ref Range    Troponin I, High Sensitivity 54 (HH) 0 - 20 ng/L   Lactate    Collection Time: 02/01/25  4:22 AM   Result Value Ref Range    Lactate 0.8 0.4 - 2.0 mmol/L   Protime-INR    Collection Time: 02/01/25  4:22 AM   Result Value Ref Range    Protime 15.3 (H) 9.8 - 12.8 seconds    INR 1.4 (H) 0.9 - 1.1   aPTT    Collection Time: 02/01/25   4:22 AM   Result Value Ref Range    aPTT 33 27 - 38 seconds   B-Type Natriuretic Peptide    Collection Time: 02/01/25  4:22 AM   Result Value Ref Range    BNP 1,524 (H) 0 - 99 pg/mL   Influenza A, and B PCR    Collection Time: 02/01/25  4:23 AM   Result Value Ref Range    Flu A Result Detected (A) Not Detected    Flu B Result Not Detected Not Detected   Sars-CoV-2 PCR    Collection Time: 02/01/25  4:23 AM   Result Value Ref Range    Coronavirus 2019, PCR Not Detected Not Detected   Urinalysis with Reflex Culture and Microscopic    Collection Time: 02/01/25  4:54 AM   Result Value Ref Range    Color, Urine Yellow Light-Yellow, Yellow, Dark-Yellow    Appearance, Urine Turbid (N) Clear    Specific Gravity, Urine 1.027 1.005 - 1.035    pH, Urine 5.5 5.0, 5.5, 6.0, 6.5, 7.0, 7.5, 8.0    Protein, Urine 100 (2+) (A) NEGATIVE, 10 (TRACE), 20 (TRACE) mg/dL    Glucose, Urine Normal Normal mg/dL    Blood, Urine NEGATIVE NEGATIVE    Ketones, Urine NEGATIVE NEGATIVE mg/dL    Bilirubin, Urine NEGATIVE NEGATIVE    Urobilinogen, Urine Normal Normal mg/dL    Nitrite, Urine NEGATIVE NEGATIVE    Leukocyte Esterase, Urine NEGATIVE NEGATIVE   Urinalysis Microscopic    Collection Time: 02/01/25  4:54 AM   Result Value Ref Range    WBC, Urine 1-5 1-5, NONE /HPF    RBC, Urine >20 (A) NONE, 1-2, 3-5 /HPF    Squamous Epithelial Cells, Urine 1-9 (SPARSE) Reference range not established. /HPF    Bacteria, Urine 2+ (A) NONE SEEN /HPF    Mucus, Urine 2+ Reference range not established. /LPF    Hyaline Casts, Urine 3+ (A) NONE /LPF   Troponin, High Sensitivity, 1 Hour    Collection Time: 02/01/25  5:49 AM   Result Value Ref Range    Troponin I, High Sensitivity 67 (HH) 0 - 20 ng/L       RADIOLOGY:  Interpreted by Radiologist.  CT chest abdomen pelvis wo IV contrast   Final Result   Bilateral lower lobe clusters of inflammatory parenchymal nodules,   suggestive of bacterial infection in the appropriate clinical setting.   Small right pleural  effusion.   Cardiomegaly.   No findings of an acute process in the abdomen or pelvis.   Cholelithiasis.   Stent-graft repair of abdominal aortic aneurysm.  The aneurysm shows   maximum diameter of 5.2 cm.   Signed by Shaw Javier MD      CT head wo IV contrast   Final Result   1. No acute intracranial abnormality   2. Advanced cerebral atrophy with extensive chronic microvascular   changes of the white matter   3. Suspected remote right occipital lobe and right MCA territory   infarcts with asymmetric volume loss in these areas similar to the   prior exam        MACRO:   None.        Signed by: Samira Antunez 2/1/2025 7:13 AM   Dictation workstation:   [id number]      XR chest 1 view   Final Result   Cardiomegaly with mild pulmonary congestion.   Probable small bilateral pleural effusion with left linear   atelectasis.   Signed by Stan Garcia MD          Encounter Date: 08/08/24   ECG 12 lead   Result Value    Ventricular Rate 89    Atrial Rate 89    MT Interval 170    QRS Duration 142    QT Interval 446    QTC Calculation(Bazett) 542    P Axis 34    R Axis -38    T Axis 133    QRS Count 14    Q Onset 205    P Onset 120    P Offset 190    T Offset 428    QTC Fredericia 508    Narrative    Sinus rhythm with occasional Premature ventricular complexes and Premature atrial complexes  Left axis deviation  Left bundle branch block  Abnormal ECG  When compared with ECG of 07-SEP-2023 15:51,  Premature ventricular complexes are now Present  Premature atrial complexes are now Present  QRS axis Shifted left  T wave inversion no longer evident in Inferior leads  See ED provider note for full interpretation and clinical correlation  Confirmed by Dipti Melchor ([id number]) on 8/9/2024 1:02:48 PM     ------------------------- NURSING NOTES AND VITALS REVIEWED ---------------------------  Visit Vitals  /75   Pulse 93   Temp (!) 38 °C (100.4 °F) (Temporal)   Resp 18   Ht 1.524 m (5')   Wt 83.5 kg (184 lb)   SpO2 (!) 93%   BMI 35.94  kg/m²   Smoking Status Former   BSA 1.88 m²         The nursing notes within the ED encounter and vital signs as below have been reviewed.     Oxygen Saturation Interpretation: Normal    Physical exam:  Patient is resting quietly in the bed  Neck is supple without JVD  Lungs sounds with coarse basilar rales  Heart tones are regular at 95 bpm without murmur rub or gallop  Abdomen is soft nontender  No rash noted    ------------------------------ ED COURSE/MEDICAL DECISION MAKING----------------------  Clinical course:  Patient has influenza A and evidence of pneumonia.  He has started antibiotics and Tamiflu.  He is demonstrating CO2 retention.  Patient's urinalysis shows 1-5 white cells and greater than 20 red cells per high-powered field with 2+ bacteria.  Flu a is positive.  Flu B and COVID are negative.  BNP is elevated 1524.  Troponin is elevated at 54 and 67.  In January 2023 his troponin was normal at 20 , electrolytes are remarkable for glucose of 131.  Patient is DNR Comfort Care arrest.  The phone prefers to be admitted here for further care and treatment.  I discussed this case with the hospitalist was agreed admit the patient when a bed is available.    Medical Decision Making:   Patient to be admitted when a bed is available.    Counseling:   The emergency provider has spoken with the patient and friend members.  We discussed today’s results, in addition to providing specific details for the plan of care and counseling regarding the diagnosis and prognosis.  Questions are answered at this time and they are agreeable with the plan.      --------------------------------- IMPRESSION AND DISPOSITION ---------------------------------    IMPRESSION  1. Influenza A    2. Elevated troponin    3. Pneumonia and influenza    4. Hypoxemia        DISPOSITION  Disposition: Admit to telemetry  Patient condition is serious

## 2025-02-01 NOTE — CARE PLAN
Patient admitted to the ICU as a boarder for flu a and pna. Patients family at bedside. Patient is not able to answer any questions at this time.

## 2025-02-01 NOTE — CARE PLAN
The clinical goals for the shift include Pateint will remain afebrile this shift      Problem: Pain - Adult  Goal: Verbalizes/displays adequate comfort level or baseline comfort level  Outcome: Progressing     Problem: Safety - Adult  Goal: Free from fall injury  Outcome: Progressing     Problem: Discharge Planning  Goal: Discharge to home or other facility with appropriate resources  Outcome: Progressing     Problem: Chronic Conditions and Co-morbidities  Goal: Patient's chronic conditions and co-morbidity symptoms are monitored and maintained or improved  Outcome: Progressing     Problem: Nutrition  Goal: Nutrient intake appropriate for maintaining nutritional needs  Outcome: Progressing     Problem: Skin  Goal: Decreased wound size/increased tissue granulation at next dressing change  Outcome: Progressing  Flowsheets (Taken 2/1/2025 1743)  Decreased wound size/increased tissue granulation at next dressing change:   Promote sleep for wound healing   Protective dressings over bony prominences  Goal: Participates in plan/prevention/treatment measures  Outcome: Progressing  Flowsheets (Taken 2/1/2025 1743)  Participates in plan/prevention/treatment measures:   Discuss with provider PT/OT consult   Increase activity/out of bed for meals   Elevate heels  Goal: Prevent/manage excess moisture  Outcome: Progressing  Flowsheets (Taken 2/1/2025 1743)  Prevent/manage excess moisture:   Monitor for/manage infection if present   Cleanse incontinence/protect with barrier cream   Follow provider orders for dressing changes  Goal: Prevent/minimize sheer/friction injuries  Outcome: Progressing  Flowsheets (Taken 2/1/2025 1743)  Prevent/minimize sheer/friction injuries:   Use pull sheet   Increase activity/out of bed for meals   Complete micro-shifts as needed if patient unable. Adjust patient position to relieve pressure points, not a full turn   HOB 30 degrees or less   Turn/reposition every 2 hours/use positioning/transfer  devices  Goal: Promote/optimize nutrition  Outcome: Progressing  Flowsheets (Taken 2/1/2025 1743)  Promote/optimize nutrition:   Monitor/record intake including meals   Consume > 50% meals/supplements   Offer water/supplements/favorite foods   Discuss with provider if NPO > 2 days  Goal: Promote skin healing  Outcome: Progressing  Flowsheets (Taken 2/1/2025 1743)  Promote skin healing:   Turn/reposition every 2 hours/use positioning/transfer devices   Protective dressings over bony prominences   Assess skin/pad under line(s)/device(s)   Ensure correct size (line/device) and apply per  instructions   Rotate device position/do not position patient on device     Problem: Pain  Goal: Takes deep breaths with improved pain control throughout the shift  Outcome: Progressing  Goal: Turns in bed with improved pain control throughout the shift  Outcome: Progressing  Goal: Walks with improved pain control throughout the shift  Outcome: Progressing  Goal: Performs ADL's with improved pain control throughout shift  Outcome: Progressing  Goal: Participates in PT with improved pain control throughout the shift  Outcome: Progressing  Goal: Free from opioid side effects throughout the shift  Outcome: Progressing  Goal: Free from acute confusion related to pain meds throughout the shift  Outcome: Progressing     Problem: Fall/Injury  Goal: Not fall by end of shift  Outcome: Progressing  Goal: Be free from injury by end of the shift  Outcome: Progressing  Goal: Verbalize understanding of personal risk factors for fall in the hospital  Outcome: Progressing  Goal: Verbalize understanding of risk factor reduction measures to prevent injury from fall in the home  Outcome: Progressing  Goal: Use assistive devices by end of the shift  Outcome: Progressing  Goal: Pace activities to prevent fatigue by end of the shift  Outcome: Progressing

## 2025-02-02 ENCOUNTER — APPOINTMENT (OUTPATIENT)
Dept: CARDIOLOGY | Facility: HOSPITAL | Age: 85
End: 2025-02-02
Payer: MEDICARE

## 2025-02-02 VITALS
TEMPERATURE: 96.4 F | BODY MASS INDEX: 35.66 KG/M2 | HEART RATE: 72 BPM | DIASTOLIC BLOOD PRESSURE: 58 MMHG | RESPIRATION RATE: 19 BRPM | WEIGHT: 181.66 LBS | OXYGEN SATURATION: 94 % | SYSTOLIC BLOOD PRESSURE: 96 MMHG | HEIGHT: 60 IN

## 2025-02-02 PROBLEM — J96.01 ACUTE HYPOXIC RESPIRATORY FAILURE (MULTI): Status: ACTIVE | Noted: 2025-02-02

## 2025-02-02 LAB
ANION GAP SERPL CALC-SCNC: 10 MMOL/L (ref 10–20)
BACTERIA BLD CULT: NORMAL
BACTERIA BLD CULT: NORMAL
BUN SERPL-MCNC: 20 MG/DL (ref 6–23)
CALCIUM SERPL-MCNC: 8.3 MG/DL (ref 8.6–10.3)
CARDIAC TROPONIN I PNL SERPL HS: 48 NG/L (ref 0–20)
CHLORIDE SERPL-SCNC: 98 MMOL/L (ref 98–107)
CO2 SERPL-SCNC: 31 MMOL/L (ref 21–32)
CREAT SERPL-MCNC: 0.85 MG/DL (ref 0.5–1.3)
EGFRCR SERPLBLD CKD-EPI 2021: 86 ML/MIN/1.73M*2
ERYTHROCYTE [DISTWIDTH] IN BLOOD BY AUTOMATED COUNT: 16.8 % (ref 11.5–14.5)
GLUCOSE SERPL-MCNC: 115 MG/DL (ref 74–99)
HCT VFR BLD AUTO: 43.9 % (ref 41–52)
HGB BLD-MCNC: 13.5 G/DL (ref 13.5–17.5)
HOLD SPECIMEN: NORMAL
MAGNESIUM SERPL-MCNC: 1.77 MG/DL (ref 1.6–2.4)
MCH RBC QN AUTO: 28.4 PG (ref 26–34)
MCHC RBC AUTO-ENTMCNC: 30.8 G/DL (ref 32–36)
MCV RBC AUTO: 92 FL (ref 80–100)
NRBC BLD-RTO: 0 /100 WBCS (ref 0–0)
PLATELET # BLD AUTO: 140 X10*3/UL (ref 150–450)
POTASSIUM SERPL-SCNC: 3.9 MMOL/L (ref 3.5–5.3)
PROCALCITONIN SERPL-MCNC: 3.67 NG/ML
RBC # BLD AUTO: 4.75 X10*6/UL (ref 4.5–5.9)
SODIUM SERPL-SCNC: 135 MMOL/L (ref 136–145)
WBC # BLD AUTO: 8.6 X10*3/UL (ref 4.4–11.3)

## 2025-02-02 PROCEDURE — 94760 N-INVAS EAR/PLS OXIMETRY 1: CPT | Mod: IPSPLIT

## 2025-02-02 PROCEDURE — 2500000002 HC RX 250 W HCPCS SELF ADMINISTERED DRUGS (ALT 637 FOR MEDICARE OP, ALT 636 FOR OP/ED): Mod: IPSPLIT

## 2025-02-02 PROCEDURE — 36415 COLL VENOUS BLD VENIPUNCTURE: CPT | Mod: IPSPLIT

## 2025-02-02 PROCEDURE — 80048 BASIC METABOLIC PNL TOTAL CA: CPT | Mod: IPSPLIT

## 2025-02-02 PROCEDURE — 99223 1ST HOSP IP/OBS HIGH 75: CPT | Performed by: NURSE PRACTITIONER

## 2025-02-02 PROCEDURE — 2500000002 HC RX 250 W HCPCS SELF ADMINISTERED DRUGS (ALT 637 FOR MEDICARE OP, ALT 636 FOR OP/ED): Mod: IPSPLIT | Performed by: NURSE PRACTITIONER

## 2025-02-02 PROCEDURE — 2500000004 HC RX 250 GENERAL PHARMACY W/ HCPCS (ALT 636 FOR OP/ED): Mod: IPSPLIT | Performed by: EMERGENCY MEDICINE

## 2025-02-02 PROCEDURE — 2020000001 HC ICU ROOM DAILY: Mod: IPSPLIT

## 2025-02-02 PROCEDURE — 93005 ELECTROCARDIOGRAM TRACING: CPT | Mod: IPSPLIT

## 2025-02-02 PROCEDURE — 2500000004 HC RX 250 GENERAL PHARMACY W/ HCPCS (ALT 636 FOR OP/ED): Mod: IPSPLIT

## 2025-02-02 PROCEDURE — 2500000005 HC RX 250 GENERAL PHARMACY W/O HCPCS: Mod: IPSPLIT | Performed by: NURSE PRACTITIONER

## 2025-02-02 PROCEDURE — 2500000005 HC RX 250 GENERAL PHARMACY W/O HCPCS: Mod: IPSPLIT

## 2025-02-02 PROCEDURE — 2500000004 HC RX 250 GENERAL PHARMACY W/ HCPCS (ALT 636 FOR OP/ED): Mod: IPSPLIT | Performed by: NURSE PRACTITIONER

## 2025-02-02 PROCEDURE — 94640 AIRWAY INHALATION TREATMENT: CPT | Mod: IPSPLIT

## 2025-02-02 PROCEDURE — 2500000001 HC RX 250 WO HCPCS SELF ADMINISTERED DRUGS (ALT 637 FOR MEDICARE OP): Mod: IPSPLIT | Performed by: INTERNAL MEDICINE

## 2025-02-02 PROCEDURE — 84484 ASSAY OF TROPONIN QUANT: CPT | Mod: IPSPLIT | Performed by: EMERGENCY MEDICINE

## 2025-02-02 PROCEDURE — 83735 ASSAY OF MAGNESIUM: CPT | Mod: IPSPLIT | Performed by: EMERGENCY MEDICINE

## 2025-02-02 PROCEDURE — 2500000001 HC RX 250 WO HCPCS SELF ADMINISTERED DRUGS (ALT 637 FOR MEDICARE OP): Mod: IPSPLIT | Performed by: NURSE PRACTITIONER

## 2025-02-02 PROCEDURE — 85027 COMPLETE CBC AUTOMATED: CPT | Mod: IPSPLIT

## 2025-02-02 RX ORDER — METOPROLOL TARTRATE 1 MG/ML
5 INJECTION, SOLUTION INTRAVENOUS ONCE
Status: COMPLETED | OUTPATIENT
Start: 2025-02-02 | End: 2025-02-02

## 2025-02-02 RX ORDER — ADENOSINE 3 MG/ML
6 INJECTION, SOLUTION INTRAVENOUS ONCE
Status: COMPLETED | OUTPATIENT
Start: 2025-02-02 | End: 2025-02-02

## 2025-02-02 RX ORDER — FUROSEMIDE 10 MG/ML
40 INJECTION INTRAMUSCULAR; INTRAVENOUS EVERY 8 HOURS
Status: COMPLETED | OUTPATIENT
Start: 2025-02-02 | End: 2025-02-03

## 2025-02-02 RX ORDER — ALBUTEROL SULFATE 0.83 MG/ML
2.5 SOLUTION RESPIRATORY (INHALATION) EVERY 2 HOUR PRN
Status: DISCONTINUED | OUTPATIENT
Start: 2025-02-02 | End: 2025-02-04 | Stop reason: HOSPADM

## 2025-02-02 RX ORDER — DOXYCYCLINE 100 MG/10ML
INJECTION, POWDER, LYOPHILIZED, FOR SOLUTION INTRAVENOUS
Status: COMPLETED
Start: 2025-02-02 | End: 2025-02-02

## 2025-02-02 RX ORDER — MIRTAZAPINE 15 MG/1
15 TABLET, FILM COATED ORAL NIGHTLY
Status: DISCONTINUED | OUTPATIENT
Start: 2025-02-02 | End: 2025-02-04 | Stop reason: HOSPADM

## 2025-02-02 RX ORDER — DONEPEZIL HYDROCHLORIDE 5 MG/1
10 TABLET, FILM COATED ORAL EVERY EVENING
Status: DISCONTINUED | OUTPATIENT
Start: 2025-02-02 | End: 2025-02-04 | Stop reason: HOSPADM

## 2025-02-02 RX ORDER — LISINOPRIL 20 MG/1
20 TABLET ORAL 2 TIMES DAILY
Status: DISCONTINUED | OUTPATIENT
Start: 2025-02-02 | End: 2025-02-04 | Stop reason: HOSPADM

## 2025-02-02 RX ORDER — CARVEDILOL 3.12 MG/1
3.12 TABLET ORAL 2 TIMES DAILY
Status: DISCONTINUED | OUTPATIENT
Start: 2025-02-02 | End: 2025-02-04 | Stop reason: HOSPADM

## 2025-02-02 RX ORDER — FUROSEMIDE 10 MG/ML
40 INJECTION INTRAMUSCULAR; INTRAVENOUS ONCE
Status: DISCONTINUED | OUTPATIENT
Start: 2025-02-02 | End: 2025-02-02

## 2025-02-02 RX ORDER — ADENOSINE 3 MG/ML
INJECTION, SOLUTION INTRAVENOUS
Status: COMPLETED
Start: 2025-02-02 | End: 2025-02-02

## 2025-02-02 RX ORDER — DIVALPROEX SODIUM 125 MG/1
125 TABLET, DELAYED RELEASE ORAL DAILY
Status: DISCONTINUED | OUTPATIENT
Start: 2025-02-02 | End: 2025-02-04 | Stop reason: HOSPADM

## 2025-02-02 RX ORDER — IPRATROPIUM BROMIDE AND ALBUTEROL SULFATE 2.5; .5 MG/3ML; MG/3ML
3 SOLUTION RESPIRATORY (INHALATION) 3 TIMES DAILY
Status: DISCONTINUED | OUTPATIENT
Start: 2025-02-02 | End: 2025-02-04 | Stop reason: HOSPADM

## 2025-02-02 RX ORDER — METOPROLOL TARTRATE 1 MG/ML
INJECTION, SOLUTION INTRAVENOUS
Status: COMPLETED
Start: 2025-02-02 | End: 2025-02-02

## 2025-02-02 RX ORDER — FUROSEMIDE 40 MG/1
40 TABLET ORAL
Status: DISCONTINUED | OUTPATIENT
Start: 2025-02-02 | End: 2025-02-04 | Stop reason: HOSPADM

## 2025-02-02 RX ORDER — DEXTROSE MONOHYDRATE 50 MG/ML
INJECTION, SOLUTION INTRAVENOUS
Status: COMPLETED
Start: 2025-02-02 | End: 2025-02-02

## 2025-02-02 RX ORDER — ATORVASTATIN CALCIUM 10 MG/1
20 TABLET, FILM COATED ORAL DAILY
Status: DISCONTINUED | OUTPATIENT
Start: 2025-02-02 | End: 2025-02-04 | Stop reason: HOSPADM

## 2025-02-02 RX ORDER — NAPROXEN SODIUM 220 MG/1
81 TABLET, FILM COATED ORAL DAILY
Status: DISCONTINUED | OUTPATIENT
Start: 2025-02-02 | End: 2025-02-04 | Stop reason: HOSPADM

## 2025-02-02 RX ORDER — MEMANTINE HYDROCHLORIDE 5 MG/1
10 TABLET ORAL 2 TIMES DAILY
Status: DISCONTINUED | OUTPATIENT
Start: 2025-02-02 | End: 2025-02-04 | Stop reason: HOSPADM

## 2025-02-02 RX ORDER — ADENOSINE 3 MG/ML
12 INJECTION, SOLUTION INTRAVENOUS ONCE
Status: COMPLETED | OUTPATIENT
Start: 2025-02-02 | End: 2025-02-02

## 2025-02-02 RX ORDER — IPRATROPIUM BROMIDE AND ALBUTEROL SULFATE 2.5; .5 MG/3ML; MG/3ML
3 SOLUTION RESPIRATORY (INHALATION)
Status: DISCONTINUED | OUTPATIENT
Start: 2025-02-02 | End: 2025-02-02

## 2025-02-02 RX ORDER — DIVALPROEX SODIUM 125 MG/1
125 TABLET, DELAYED RELEASE ORAL DAILY
COMMUNITY
End: 2025-02-10 | Stop reason: ALTCHOICE

## 2025-02-02 RX ADMIN — APIXABAN 5 MG: 5 TABLET, FILM COATED ORAL at 10:35

## 2025-02-02 RX ADMIN — FUROSEMIDE 40 MG: 10 INJECTION, SOLUTION INTRAMUSCULAR; INTRAVENOUS at 21:03

## 2025-02-02 RX ADMIN — FUROSEMIDE 40 MG: 40 TABLET ORAL at 08:50

## 2025-02-02 RX ADMIN — AMIODARONE HYDROCHLORIDE 1 MG/MIN: 1.8 INJECTION, SOLUTION INTRAVENOUS at 03:49

## 2025-02-02 RX ADMIN — Medication 6 MG: at 21:27

## 2025-02-02 RX ADMIN — ASPIRIN 81 MG CHEWABLE TABLET 81 MG: 81 TABLET CHEWABLE at 08:50

## 2025-02-02 RX ADMIN — ATORVASTATIN CALCIUM 20 MG: 10 TABLET, FILM COATED ORAL at 08:50

## 2025-02-02 RX ADMIN — METOPROLOL TARTRATE 5 MG: 1 INJECTION, SOLUTION INTRAVENOUS at 03:38

## 2025-02-02 RX ADMIN — OSELTAMIVIR PHOSPHATE 75 MG: 75 CAPSULE ORAL at 21:04

## 2025-02-02 RX ADMIN — FUROSEMIDE 40 MG: 10 INJECTION, SOLUTION INTRAMUSCULAR; INTRAVENOUS at 17:17

## 2025-02-02 RX ADMIN — CARVEDILOL 3.12 MG: 3.12 TABLET, FILM COATED ORAL at 21:03

## 2025-02-02 RX ADMIN — DOXYCYCLINE 100 MG: 100 INJECTION, POWDER, LYOPHILIZED, FOR SOLUTION INTRAVENOUS at 06:46

## 2025-02-02 RX ADMIN — DOXYCYCLINE 100 MG: 100 INJECTION, POWDER, LYOPHILIZED, FOR SOLUTION INTRAVENOUS at 17:57

## 2025-02-02 RX ADMIN — ADENOSINE 6 MG: 3 INJECTION, SOLUTION INTRAVENOUS at 03:27

## 2025-02-02 RX ADMIN — ADENOSINE 6 MG: 3 INJECTION INTRAVENOUS at 03:27

## 2025-02-02 RX ADMIN — MEMANTINE 10 MG: 5 TABLET ORAL at 21:02

## 2025-02-02 RX ADMIN — IPRATROPIUM BROMIDE AND ALBUTEROL SULFATE 3 ML: .5; 3 SOLUTION RESPIRATORY (INHALATION) at 16:33

## 2025-02-02 RX ADMIN — METOPROLOL TARTRATE 5 MG: 5 INJECTION INTRAVENOUS at 03:38

## 2025-02-02 RX ADMIN — Medication 4 L/MIN: at 16:33

## 2025-02-02 RX ADMIN — OSELTAMIVIR PHOSPHATE 75 MG: 75 CAPSULE ORAL at 10:52

## 2025-02-02 RX ADMIN — MIRTAZAPINE 15 MG: 15 TABLET, FILM COATED ORAL at 21:03

## 2025-02-02 RX ADMIN — ADENOSINE 12 MG: 3 INJECTION INTRAVENOUS at 03:30

## 2025-02-02 RX ADMIN — PIPERACILLIN SODIUM AND TAZOBACTAM SODIUM 3.38 G: 3; .375 INJECTION, SOLUTION INTRAVENOUS at 04:31

## 2025-02-02 RX ADMIN — GUAIFENESIN 600 MG: 600 TABLET, EXTENDED RELEASE ORAL at 08:50

## 2025-02-02 RX ADMIN — Medication 4 L/MIN: at 20:20

## 2025-02-02 RX ADMIN — METHYLPREDNISOLONE SODIUM SUCCINATE 40 MG: 40 INJECTION, POWDER, FOR SOLUTION INTRAMUSCULAR; INTRAVENOUS at 10:35

## 2025-02-02 RX ADMIN — AMIODARONE HYDROCHLORIDE 0.5 MG/MIN: 1.8 INJECTION, SOLUTION INTRAVENOUS at 10:43

## 2025-02-02 RX ADMIN — CEFTRIAXONE 1 G: 1 INJECTION, SOLUTION INTRAVENOUS at 17:51

## 2025-02-02 RX ADMIN — AMIODARONE HYDROCHLORIDE 150 MG: 1.5 INJECTION, SOLUTION INTRAVENOUS at 03:33

## 2025-02-02 RX ADMIN — LISINOPRIL 20 MG: 20 TABLET ORAL at 08:50

## 2025-02-02 RX ADMIN — IPRATROPIUM BROMIDE AND ALBUTEROL SULFATE 3 ML: .5; 3 SOLUTION RESPIRATORY (INHALATION) at 11:40

## 2025-02-02 RX ADMIN — DIVALPROEX SODIUM 125 MG: 125 TABLET, DELAYED RELEASE ORAL at 08:51

## 2025-02-02 RX ADMIN — POLYETHYLENE GLYCOL 3350 17 G: 17 POWDER, FOR SOLUTION ORAL at 08:51

## 2025-02-02 RX ADMIN — PANTOPRAZOLE SODIUM 40 MG: 40 INJECTION, POWDER, LYOPHILIZED, FOR SOLUTION INTRAVENOUS at 06:46

## 2025-02-02 RX ADMIN — IPRATROPIUM BROMIDE AND ALBUTEROL SULFATE 3 ML: .5; 3 SOLUTION RESPIRATORY (INHALATION) at 20:18

## 2025-02-02 RX ADMIN — LISINOPRIL 20 MG: 20 TABLET ORAL at 21:03

## 2025-02-02 RX ADMIN — APIXABAN 5 MG: 5 TABLET, FILM COATED ORAL at 21:03

## 2025-02-02 RX ADMIN — MEMANTINE 10 MG: 5 TABLET ORAL at 08:51

## 2025-02-02 RX ADMIN — METHYLPREDNISOLONE SODIUM SUCCINATE 40 MG: 40 INJECTION, POWDER, FOR SOLUTION INTRAMUSCULAR; INTRAVENOUS at 17:17

## 2025-02-02 RX ADMIN — DONEPEZIL HYDROCHLORIDE 10 MG: 5 TABLET, FILM COATED ORAL at 21:02

## 2025-02-02 RX ADMIN — CARVEDILOL 3.12 MG: 3.12 TABLET, FILM COATED ORAL at 08:50

## 2025-02-02 ASSESSMENT — PAIN SCALES - GENERAL
PAINLEVEL_OUTOF10: 0 - NO PAIN

## 2025-02-02 ASSESSMENT — PAIN - FUNCTIONAL ASSESSMENT: PAIN_FUNCTIONAL_ASSESSMENT: 0-10

## 2025-02-02 NOTE — CONSULTS
Reason For Consult  Critical care    History Of Present Illness  Blu Grigsby is a 84 y.o. male presenting with cough and SOB.  Known to have Dementia, HTN, Dyslipidemia developed cough and more confusion. Was taken to ER and found to have FLU and PNA. Was initially parked in ICU in observation status but developed wide-complex tachycardia and a rapid response was called.   Now on Amio gtt     Past Medical History  He has a past medical history of Abdominal aortic aneurysm (AAA) (CMS-HCC), CHF (congestive heart failure), Dementia, HLD (hyperlipidemia), HTN (hypertension), Personal history of other diseases of the circulatory system (10/11/2017), Personal history of other diseases of the circulatory system, and Personal history of other diseases of the circulatory system.    Surgical History  He has a past surgical history that includes Other surgical history (07/07/2017); Cardiac surgery (07/07/2017); Other surgical history (08/28/2017); CT angio abdomen pelvis w and or wo IV IV contrast (6/10/2013); CT angio abdomen w and or wo IV IV contrast (6/23/2014); CT angio abdomen w and or wo IV IV contrast (9/6/2017); and CT angio abdomen pelvis w and or wo IV IV contrast (12/17/2012).     Social History  He reports that he quit smoking about 27 years ago. His smoking use included cigarettes. He started smoking about 62 years ago. He has a 35 pack-year smoking history. He has never used smokeless tobacco. He reports that he does not currently use alcohol. He reports that he does not use drugs.    Family History  No family history on file.     Allergies  Patient has no known allergies.    Review of Systems  Too confuded to assess     Physical Exam  Limited given given nature of visit     Last Recorded Vitals  Blood pressure (!) 121/92, pulse 76, temperature 36.5 °C (97.7 °F), resp. rate 17, height 1.524 m (5'), weight 82.4 kg (181 lb 10.5 oz), SpO2 95%.    Relevant Results     Results from last 7 days   Lab Units  02/02/25  0349   WBC AUTO x10*3/uL 8.6   HEMOGLOBIN g/dL 13.5   HEMATOCRIT % 43.9   PLATELETS AUTO x10*3/uL 140*      Results from last 7 days   Lab Units 02/02/25  0349 02/01/25  0422   SODIUM mmol/L 135* 138   POTASSIUM mmol/L 3.9 4.0   CHLORIDE mmol/L 98 98   CO2 mmol/L 31 31   BUN mg/dL 20 18   CREATININE mg/dL 0.85 1.03   CALCIUM mg/dL 8.3* 9.0   PROTEIN TOTAL g/dL  --  7.3   BILIRUBIN TOTAL mg/dL  --  1.2   ALK PHOS U/L  --  137*   ALT U/L  --  39   AST U/L  --  56*   GLUCOSE mg/dL 115* 131*    CT angio chest for pulmonary embolism 08/08/2024      1. Moderate motion artifact.  No large pulmonary embolism.  2. Diffuse bronchial wall thickening suggestive of bronchitis. There  is some associated mucous plugging with small superimposed patchy  areas of airspace consolidation in the lower lobes left greater than  right.  Correlate clinically for pneumonia.  3. Mild to moderate emphysema.  4. Cardiomegaly with severe coronary artery calcifications.  Signed by Jus Valentine MD      Assessment/Plan     ACUTE RESPIRATORY FAILURE  FLU A INFECTION  ACUTE PNEUMONIA  WIDE-COMPLEX TACHYCARDIA  DEMENTIA    Upgrade to ICU status  IV Amio gtt  IV antibiotics  O2 at 6 LPM, wean as tolerated  Cardiology eval  Septic work up  Monitor lytes and correct    The entirety of this visit- history, physical exam, and diagnostic interpretation was done via telemedicine. This telemedicine visit format was a completed with a combination of audio and visual capability. This was done with on-site assistance of the bedside RN. Patient is located in [place], [place]  and I am located in [place], [place].    Bethanie Hernández MD [facility]        I spent 60 minutes in the professional and overall care of this patient.      Bethanie Hernández MD

## 2025-02-02 NOTE — CONSULTS
Consults  History Of Present Illness:    Blu Grigsby is a 84 y.o. male presenting with Generalized weakness, fatigue, cough, hypoxia, increased confusion, difficulty standing and ambulating.  Patient currently is not in acute respiratory distress.  He has been diagnosed with influenza A, acute pneumonia.  At 318 this morning a rapid response was called due to wide-complex tachycardia, electrocardiogram reviewed and shows atrial fibrillation with rapid ventricular rate.  He has been placed on intravenous amiodarone drip and is currently in sinus rhythm.    Past Medical History:  He has a past medical history of Abdominal aortic aneurysm (AAA) (CMS-[facility]), CAD (coronary artery disease), Cerebral ventriculomegaly, CHF (congestive heart failure), Chronic systolic heart failure, Dementia, Dementia, HLD (hyperlipidemia), HTN (hypertension), Hypercholesterolemia, Insomnia, Ischemic dilated cardiomyopathy (Multi), LBBB (left bundle branch block), Sleep apnea, and Vitamin D deficiency.    Past Surgical History:  He has a past surgical history that includes Cardiac surgery (07/07/2017); CT angio abdomen pelvis w and or wo IV IV contrast (06/10/2013); CT angio abdomen w and or wo IV IV contrast (06/23/2014); CT angio abdomen w and or wo IV IV contrast (09/06/2017); CT angio abdomen pelvis w and or wo IV IV contrast (12/17/2012); Abdominal aortic aneurysm repair; and Abdominal surgery.      Social History:  He reports that he quit smoking about 27 years ago. His smoking use included cigarettes. He started smoking about 62 years ago. He has a 35 pack-year smoking history. He has never used smokeless tobacco. He reports that he does not currently use alcohol. He reports that he does not use drugs.    Family History:  Family History   Problem Relation Name Age of Onset    Heart attack Other      Heart disease Other          Allergies:  Patient has no known allergies.    Outpatient Medications:  Current Outpatient Medications    Medication Instructions    aspirin 81 mg chewable tablet 1 tablet, Daily    atorvastatin (LIPITOR) 20 mg, oral, Daily    carvedilol (COREG) 3.125 mg, oral, 2 times daily    divalproex (DEPAKOTE) 125 mg, Daily    donepezil (ARICEPT) 10 mg, oral, Every evening    furosemide (LASIX) 40 mg, oral, 2 times daily (morning and late afternoon)    lisinopril 20 mg, oral, 2 times daily    memantine (NAMENDA) 10 mg, oral, 2 times daily    mirtazapine (REMERON) 15 mg, oral, Nightly    multivitamin with folic acid (One Daily Multivitamin) 400 mcg tablet 1 tablet, Daily    vit A/vit C/vit E/zinc/copper (PRESERVISION AREDS ORAL) 1 capsule, 2 times daily         Inpatient Medications:  PRN medications   Medication    acetaminophen    acetaminophen    benzocaine-menthol    dextromethorphan-guaifenesin    guaiFENesin    melatonin    ondansetron ODT    Or    ondansetron    oxygen     Continuous Medications   Medication Dose Last Rate    amiodarone  0.5 mg/min 0.5 mg/min (02/02/25 1043)       Last Labs:  Results for orders placed or performed during the hospital encounter of 02/01/25 (from the past 96 hours)   Blood Gas Arterial Full Panel   Result Value Ref Range    POCT pH, Arterial 7.38 7.38 - 7.42 pH    POCT pCO2, Arterial 55 (H) 38 - 42 mm Hg    POCT pO2, Arterial 80 (L) 85 - 95 mm Hg    POCT SO2, Arterial 96 94 - 100 %    POCT Oxy Hemoglobin, Arterial 93.4 (L) 94.0 - 98.0 %    POCT Hematocrit Calculated, Arterial 43.0 41.0 - 52.0 %    POCT Sodium, Arterial 134 (L) 136 - 145 mmol/L    POCT Potassium, Arterial 3.9 3.5 - 5.3 mmol/L    POCT Chloride, Arterial 100 98 - 107 mmol/L    POCT Ionized Calcium, Arterial 1.18 1.10 - 1.33 mmol/L    POCT Glucose, Arterial 134 (H) 74 - 99 mg/dL    POCT Lactate, Arterial 0.8 0.4 - 2.0 mmol/L    POCT Base Excess, Arterial 5.7 (H) -2.0 - 3.0 mmol/L    POCT HCO3 Calculated, Arterial 32.5 (H) 22.0 - 26.0 mmol/L    POCT Hemoglobin, Arterial 14.3 13.5 - 17.5 g/dL    POCT Anion Gap, Arterial 5 (L) 10 -  25 mmo/L    Patient Temperature      FiO2 44 %    Site of Arterial Puncture Radial Right     Imtiaz's Test Positive    CBC and Auto Differential   Result Value Ref Range    WBC 11.1 4.4 - 11.3 x10*3/uL    nRBC 0.0 0.0 - 0.0 /100 WBCs    RBC 5.27 4.50 - 5.90 x10*6/uL    Hemoglobin 14.9 13.5 - 17.5 g/dL    Hematocrit 48.0 41.0 - 52.0 %    MCV 91 80 - 100 fL    MCH 28.3 26.0 - 34.0 pg    MCHC 31.0 (L) 32.0 - 36.0 g/dL    RDW 16.7 (H) 11.5 - 14.5 %    Platelets 152 150 - 450 x10*3/uL    Neutrophils % 87.3 40.0 - 80.0 %    Immature Granulocytes %, Automated 0.5 0.0 - 0.9 %    Lymphocytes % 3.9 13.0 - 44.0 %    Monocytes % 7.6 2.0 - 10.0 %    Eosinophils % 0.2 0.0 - 6.0 %    Basophils % 0.5 0.0 - 2.0 %    Neutrophils Absolute 9.67 (H) 1.60 - 5.50 x10*3/uL    Immature Granulocytes Absolute, Automated 0.06 0.00 - 0.50 x10*3/uL    Lymphocytes Absolute 0.43 (L) 0.80 - 3.00 x10*3/uL    Monocytes Absolute 0.84 (H) 0.05 - 0.80 x10*3/uL    Eosinophils Absolute 0.02 0.00 - 0.40 x10*3/uL    Basophils Absolute 0.05 0.00 - 0.10 x10*3/uL   Comprehensive Metabolic Panel   Result Value Ref Range    Glucose 131 (H) 74 - 99 mg/dL    Sodium 138 136 - 145 mmol/L    Potassium 4.0 3.5 - 5.3 mmol/L    Chloride 98 98 - 107 mmol/L    Bicarbonate 31 21 - 32 mmol/L    Anion Gap 13 10 - 20 mmol/L    Urea Nitrogen 18 6 - 23 mg/dL    Creatinine 1.03 0.50 - 1.30 mg/dL    eGFR 72 >60 mL/min/1.73m*2    Calcium 9.0 8.6 - 10.3 mg/dL    Albumin 4.2 3.4 - 5.0 g/dL    Alkaline Phosphatase 137 (H) 33 - 136 U/L    Total Protein 7.3 6.4 - 8.2 g/dL    AST 56 (H) 9 - 39 U/L    Bilirubin, Total 1.2 0.0 - 1.2 mg/dL    ALT 39 10 - 52 U/L   Magnesium   Result Value Ref Range    Magnesium 1.95 1.60 - 2.40 mg/dL   Troponin I, High Sensitivity, Initial   Result Value Ref Range    Troponin I, High Sensitivity 54 (HH) 0 - 20 ng/L   Lactate   Result Value Ref Range    Lactate 0.8 0.4 - 2.0 mmol/L   Protime-INR   Result Value Ref Range    Protime 15.3 (H) 9.8 - 12.8 seconds     INR 1.4 (H) 0.9 - 1.1   aPTT   Result Value Ref Range    aPTT 33 27 - 38 seconds   B-Type Natriuretic Peptide   Result Value Ref Range    BNP 1,524 (H) 0 - 99 pg/mL   Blood Culture    Specimen: Peripheral Venipuncture; Blood culture   Result Value Ref Range    Blood Culture Loaded on Instrument - Culture in progress    Blood Culture    Specimen: Peripheral Venipuncture; Blood culture   Result Value Ref Range    Blood Culture Loaded on Instrument - Culture in progress    Influenza A, and B PCR   Result Value Ref Range    Flu A Result Detected (A) Not Detected    Flu B Result Not Detected Not Detected   Sars-CoV-2 PCR   Result Value Ref Range    Coronavirus 2019, PCR Not Detected Not Detected   Urinalysis with Reflex Culture and Microscopic   Result Value Ref Range    Color, Urine Yellow Light-Yellow, Yellow, Dark-Yellow    Appearance, Urine Turbid (N) Clear    Specific Gravity, Urine 1.027 1.005 - 1.035    pH, Urine 5.5 5.0, 5.5, 6.0, 6.5, 7.0, 7.5, 8.0    Protein, Urine 100 (2+) (A) NEGATIVE, 10 (TRACE), 20 (TRACE) mg/dL    Glucose, Urine Normal Normal mg/dL    Blood, Urine NEGATIVE NEGATIVE    Ketones, Urine NEGATIVE NEGATIVE mg/dL    Bilirubin, Urine NEGATIVE NEGATIVE    Urobilinogen, Urine Normal Normal mg/dL    Nitrite, Urine NEGATIVE NEGATIVE    Leukocyte Esterase, Urine NEGATIVE NEGATIVE   Extra Urine Gray Tube   Result Value Ref Range    Extra Tube Hold for add-ons.    Urinalysis Microscopic   Result Value Ref Range    WBC, Urine 1-5 1-5, NONE /HPF    RBC, Urine >20 (A) NONE, 1-2, 3-5 /HPF    Squamous Epithelial Cells, Urine 1-9 (SPARSE) Reference range not established. /HPF    Bacteria, Urine 2+ (A) NONE SEEN /HPF    Mucus, Urine 2+ Reference range not established. /LPF    Hyaline Casts, Urine 3+ (A) NONE /LPF   Troponin, High Sensitivity, 1 Hour   Result Value Ref Range    Troponin I, High Sensitivity 67 (HH) 0 - 20 ng/L   MRSA Surveillance for Vancomycin De-escalation, PCR    Specimen: Anterior Nares;  Swab   Result Value Ref Range    MRSA PCR Not Detected Not Detected   Procalcitonin   Result Value Ref Range    Procalcitonin 3.67 (H) <=0.07 ng/mL   Troponin I, High Sensitivity   Result Value Ref Range    Troponin I, High Sensitivity 67 (HH) 0 - 20 ng/L   CBC   Result Value Ref Range    WBC 8.6 4.4 - 11.3 x10*3/uL    nRBC 0.0 0.0 - 0.0 /100 WBCs    RBC 4.75 4.50 - 5.90 x10*6/uL    Hemoglobin 13.5 13.5 - 17.5 g/dL    Hematocrit 43.9 41.0 - 52.0 %    MCV 92 80 - 100 fL    MCH 28.4 26.0 - 34.0 pg    MCHC 30.8 (L) 32.0 - 36.0 g/dL    RDW 16.8 (H) 11.5 - 14.5 %    Platelets 140 (L) 150 - 450 x10*3/uL   Basic metabolic panel   Result Value Ref Range    Glucose 115 (H) 74 - 99 mg/dL    Sodium 135 (L) 136 - 145 mmol/L    Potassium 3.9 3.5 - 5.3 mmol/L    Chloride 98 98 - 107 mmol/L    Bicarbonate 31 21 - 32 mmol/L    Anion Gap 10 10 - 20 mmol/L    Urea Nitrogen 20 6 - 23 mg/dL    Creatinine 0.85 0.50 - 1.30 mg/dL    eGFR 86 >60 mL/min/1.73m*2    Calcium 8.3 (L) 8.6 - 10.3 mg/dL   Troponin I, High Sensitivity   Result Value Ref Range    Troponin I, High Sensitivity 48 (H) 0 - 20 ng/L   Magnesium   Result Value Ref Range    Magnesium 1.77 1.60 - 2.40 mg/dL   SST TOP   Result Value Ref Range    Extra Tube Hold for add-ons.           Last Recorded Vitals:  Vitals:    02/02/25 1000 02/02/25 1100 02/02/25 1200 02/02/25 1300   BP: 95/57 105/68 119/68 117/72   BP Location: Right arm Right arm Right arm Right arm   Patient Position: Lying Lying Lying Lying   Pulse: 72 71 73 77   Resp: 14 15 26 21   Temp:   36.2 °C (97.2 °F)    TempSrc:   Temporal    SpO2: 95% 100% 99% 91%   Weight:       Height:         I/O last 3 completed shifts:  In: 399.4 (4.8 mL/kg) [I.V.:149.4 (1.8 mL/kg); IV Piggyback:250]  Out: - (0 mL/kg)   Weight: 82.4 kg       Physical Exam:  Constitutional: Well developed, awake/alert/oriented x3, no distress, alert and cooperative  Eyes: PERRL, EOMI, clear sclera  ENMT: mucous membranes moist, no apparent injury, no  lesions seen  Head/Neck: Neck supple, no apparent injury, thyroid without mass or tenderness, No JVD, trachea midline, no bruits  Respiratory/Thorax: Patent airways, CTAB, normal breath sounds with good chest expansion, thorax symmetric  Cardiovascular: Regular, rate and rhythm, no murmurs, 2+ equal pulses of the extremities, normal S 1and S 2  Gastrointestinal: Nondistended, soft, non-tender, no rebound tenderness or guarding, no masses palpable, no organomegaly, +BS, no bruits  Musculoskeletal: ROM intact, no joint swelling, normal strength  Extremities: normal extremities, no cyanosis edema, contusions or wounds, no clubbing  Neurological: alert and oriented x3, intact senses, motor, response and reflexes, normal strength  Lymphatic: No significant lymphadenopathy  Psychological: Appropriate mood and behavior  Skin: Warm and dry, no lesions, no rashes     Assessment/Plan   Problem List Items Addressed This Visit          Cardiac and Vasculature    Elevated troponin    Heart failure with mid-range ejection fraction (HFmEF)    Relevant Medications    metoprolol tartrate (Lopressor) injection 5 mg (Completed)    carvedilol (Coreg) tablet 3.125 mg    Other Relevant Orders    Transthoracic Echo (TTE) Limited       Infectious Diseases    * (Principal) Influenza A - Primary       Pulmonary and Pneumonias    Hypoxemia    Pneumonia and influenza     Other Visit Diagnoses       Atrial fibrillation with rapid ventricular response (Multi)        Relevant Medications    metoprolol tartrate (Lopressor) injection 5 mg (Completed)    carvedilol (Coreg) tablet 3.125 mg    Other Relevant Orders    Transthoracic Echo (TTE) Limited        Patient has been admitted to the hospital with acute hypoxic respiratory failure due to pneumonia, influenza A infection.  Patient has been placed on broad-spectrum antibiotics, oxygen, bronchodilators, steroids, oseltamivir.    He has known coronary artery disease which is currently stable and he  has no active angina pectoris.  Elevated troponin likely due to subendocardial stress, demand injury in the setting of acute infection.    BNP level is elevated but there is no current evidence of volume overload.    He had rapid response due to atrial fibrillation with rapid ventricular rate, started on intravenous amiodarone and currently in sinus rhythm.  Continue IV amiodarone for additional 24 hours and then transition to oral amiodarone 200 mg daily.  Elevated risk for cardioembolic stroke, start apixaban 5 mg twice daily.  For his coronary artery disease he will continue on low-dose aspirin, beta-blockers, continue atorvastatin for hyperlipidemia continue ACE inhibitors and diuretics for congestive heart failure.  Closely monitor electrolytes, renal function.    He has chronic left bundle branch block no evidence of any symptomatic bradycardia.  Most recent echocardiogram was completed in September 2021 and reviewed by me, moderate septal hypertrophy, posterior mitral valve prolapse, moderate mitral regurgitation, normal ejection fraction.  Echocardiogram will be repeated tomorrow.    Case discussed with nursing staff and hospitalist team.  Peripheral IV 20 G Left Hand (Active)   Site Assessment Clean;Dry;Intact 02/02/25 0900   Dressing Status Clean;Dry 02/02/25 0900   Number of days: 1       Peripheral IV 02/01/25 20 G Right Antecubital (Active)   Site Assessment Clean;Dry;Intact 02/02/25 0900   Dressing Status Dry;Clean 02/02/25 0900   Number of days: 1       Code Status:  DNR and No Intubation    I spent 30 minutes in the professional and overall care of this patient.        Elliott Kaplan MD

## 2025-02-02 NOTE — NURSING NOTE
0730 Assumed nursing care for patient. Patient found at 5 liters NC, pulse ox at 92% or greater.

## 2025-02-02 NOTE — CARE PLAN
The patient's goals for the shift include      The clinical goals for the shift include Pateint will remain afebrile this shift      Problem: Pain - Adult  Goal: Verbalizes/displays adequate comfort level or baseline comfort level  Outcome: Progressing  Flowsheets (Taken 2/2/2025 0304)  Verbalizes/displays adequate comfort level or baseline comfort level:   Encourage patient to monitor pain and request assistance   Administer analgesics based on type and severity of pain and evaluate response   Implement non-pharmacological measures as appropriate and evaluate response   Consider cultural and social influences on pain and pain management   Notify Licensed Independent Practitioner if interventions unsuccessful or patient reports new pain   Assess pain using appropriate pain scale     Problem: Safety - Adult  Goal: Free from fall injury  Outcome: Progressing  Flowsheets (Taken 2/2/2025 0304)  Free from fall injury:   Instruct family/caregiver on patient safety   Based on caregiver fall risk screen, instruct family/caregiver to ask for assistance with transferring infant if caregiver noted to have fall risk factors     Problem: Discharge Planning  Goal: Discharge to home or other facility with appropriate resources  Outcome: Progressing  Flowsheets (Taken 2/2/2025 0304)  Discharge to home or other facility with appropriate resources:   Identify barriers to discharge with patient and caregiver   Arrange for needed discharge resources and transportation as appropriate   Identify discharge learning needs (meds, wound care, etc)   Arrange for interpreters to assist at discharge as needed   Refer to discharge planning if patient needs post-hospital services based on physician order or complex needs related to functional status, cognitive ability or social support system     Problem: Chronic Conditions and Co-morbidities  Goal: Patient's chronic conditions and co-morbidity symptoms are monitored and maintained or  improved  Outcome: Progressing  Flowsheets (Taken 2/2/2025 0304)  Care Plan - Patient's Chronic Conditions and Co-Morbidity Symptoms are Monitored and Maintained or Improved:   Monitor and assess patient's chronic conditions and comorbid symptoms for stability, deterioration, or improvement   Collaborate with multidisciplinary team to address chronic and comorbid conditions and prevent exacerbation or deterioration   Update acute care plan with appropriate goals if chronic or comorbid symptoms are exacerbated and prevent overall improvement and discharge     Problem: Skin  Goal: Decreased wound size/increased tissue granulation at next dressing change  Outcome: Progressing  Flowsheets (Taken 2/2/2025 0304)  Decreased wound size/increased tissue granulation at next dressing change:   Promote sleep for wound healing   Protective dressings over bony prominences  Goal: Participates in plan/prevention/treatment measures  Outcome: Progressing  Flowsheets (Taken 2/2/2025 0304)  Participates in plan/prevention/treatment measures:   Discuss with provider PT/OT consult   Elevate heels   Increase activity/out of bed for meals  Goal: Prevent/manage excess moisture  Outcome: Progressing  Flowsheets (Taken 2/2/2025 0304)  Prevent/manage excess moisture:   Cleanse incontinence/protect with barrier cream   Monitor for/manage infection if present   Moisturize dry skin   Follow provider orders for dressing changes   Use wicking fabric (obtain order)  Goal: Prevent/minimize sheer/friction injuries  Outcome: Progressing  Flowsheets (Taken 2/2/2025 0304)  Prevent/minimize sheer/friction injuries:   Complete micro-shifts as needed if patient unable. Adjust patient position to relieve pressure points, not a full turn   Increase activity/out of bed for meals   Use pull sheet   HOB 30 degrees or less   Turn/reposition every 2 hours/use positioning/transfer devices   Utilize specialty bed per algorithm  Goal: Promote/optimize  nutrition  Outcome: Progressing  Flowsheets (Taken 2/2/2025 0304)  Promote/optimize nutrition:   Assist with feeding   Monitor/record intake including meals   Consume > 50% meals/supplements   Offer water/supplements/favorite foods   Reassess MST if dietician not consulted   Discuss with provider if NPO > 2 days  Goal: Promote skin healing  Outcome: Progressing  Flowsheets (Taken 2/2/2025 0304)  Promote skin healing:   Assess skin/pad under line(s)/device(s)   Protective dressings over bony prominences   Turn/reposition every 2 hours/use positioning/transfer devices   Ensure correct size (line/device) and apply per  instructions   Rotate device position/do not position patient on device     Problem: Pain  Goal: Takes deep breaths with improved pain control throughout the shift  Outcome: Progressing  Goal: Turns in bed with improved pain control throughout the shift  Outcome: Progressing  Goal: Walks with improved pain control throughout the shift  Outcome: Progressing  Goal: Performs ADL's with improved pain control throughout shift  Outcome: Progressing  Goal: Participates in PT with improved pain control throughout the shift  Outcome: Progressing  Goal: Free from opioid side effects throughout the shift  Outcome: Progressing  Goal: Free from acute confusion related to pain meds throughout the shift  Outcome: Progressing     Problem: Fall/Injury  Goal: Not fall by end of shift  Outcome: Progressing  Goal: Be free from injury by end of the shift  Outcome: Progressing  Goal: Verbalize understanding of personal risk factors for fall in the hospital  Outcome: Progressing  Goal: Verbalize understanding of risk factor reduction measures to prevent injury from fall in the home  Outcome: Progressing  Goal: Use assistive devices by end of the shift  Outcome: Progressing  Goal: Pace activities to prevent fatigue by end of the shift  Outcome: Progressing

## 2025-02-02 NOTE — NURSING NOTE
Placed call at this time to patient DTR to update her on patient change in status as a Rapid response was called d/t patient change in NSR to sustained Vtach.

## 2025-02-02 NOTE — H&P
History Of Present Illness  Blu Grigsby is a 84 y.o. male with PMH of heart failure, dementia, HLD, HTN who presented to the ED yesterday with cough, congestion and fatigue x 1 day. He was hypoxic on arrival requiring 2 liters of oxygen. He was unable to give history due to his dementia. His workup showed Flu A with probably bilateral lower lobe pneumonia. His BNP and troponins were elevated as well with imaging showing congestion. He was started on vancomycin and zosyn, tamiflu and bronchodilators and admitted to Medicine for further treatment and evaluation and close monitoring of hemodynamic status.     VS: T 38, RR 18, /74, HR 93  EKG: SR with PACs, LBBB  Viral panel: Flu A  AB.38/55/80/32.5  Significant labs: BNP 1524, Troponin 54/67,   Diagnostics: CT c/a/p: bilateral LL pneumonia, small R pleural effusion. CTH remote infarcts, cerebral atrophy. CXR with pulmonary congestion, small b/l pleural effusion     Past Medical History  Past Medical History:   Diagnosis Date    Abdominal aortic aneurysm (AAA) (CMS-HCC)     CAD (coronary artery disease)     Cerebral ventriculomegaly     CHF (congestive heart failure)     Chronic systolic heart failure     Dementia     Dementia     HLD (hyperlipidemia)     HTN (hypertension)     Hypercholesterolemia     Insomnia     Ischemic dilated cardiomyopathy (Multi)     LBBB (left bundle branch block)     Sleep apnea     Vitamin D deficiency      Surgical History  Past Surgical History:   Procedure Laterality Date    ABDOMINAL AORTIC ANEURYSM REPAIR      Endovascular    ABDOMINAL SURGERY      CARDIAC SURGERY  2017    CT ABDOMEN ANGIOGRAM W AND/OR WO IV CONTRAST  2014    CT ABDOMEN ANGIOGRAM W AND/OR WO IV CONTRAST  2017    CT ABDOMEN PELVIS ANGIOGRAM W AND/OR WO IV CONTRAST  06/10/2013    CT ABDOMEN PELVIS ANGIOGRAM W AND/OR WO IV CONTRAST  2012     Social History  He reports that he quit smoking about 27 years ago. His smoking use included  cigarettes. He started smoking about 62 years ago. He has a 35 pack-year smoking history. He has never used smokeless tobacco. He reports that he does not currently use alcohol. He reports that he does not use drugs.    Family History  Family History   Problem Relation Name Age of Onset    Heart attack Other      Heart disease Other        Allergies  Patient has no known allergies.    Review of Systems   Unable to perform ROS: Dementia        Physical Exam  Constitutional:       General: He is not in acute distress.     Appearance: Normal appearance. He is not toxic-appearing.   HENT:      Head: Normocephalic and atraumatic.      Mouth/Throat:      Mouth: Mucous membranes are moist.   Eyes:      Extraocular Movements: Extraocular movements intact.      Pupils: Pupils are equal, round, and reactive to light.   Cardiovascular:      Rate and Rhythm: Normal rate and regular rhythm.      Pulses: Normal pulses.      Heart sounds: Normal heart sounds. No murmur heard.     No gallop.   Pulmonary:      Effort: Pulmonary effort is normal. No respiratory distress.      Breath sounds: Wheezing and rhonchi present. No rales.   Abdominal:      General: Bowel sounds are normal. There is no distension.      Palpations: Abdomen is soft.      Tenderness: There is no abdominal tenderness. There is no guarding or rebound.   Musculoskeletal:         General: No swelling, tenderness, deformity or signs of injury. Normal range of motion.      Cervical back: Normal range of motion and neck supple.   Skin:     General: Skin is warm and dry.      Capillary Refill: Capillary refill takes less than 2 seconds.      Coloration: Skin is not jaundiced.      Findings: No bruising or rash.   Neurological:      General: No focal deficit present.      Mental Status: He is alert. Mental status is at baseline. He is disoriented.      Cranial Nerves: No cranial nerve deficit.      Sensory: No sensory deficit.   Psychiatric:         Mood and Affect: Mood  normal.         Behavior: Behavior normal.       Last Recorded Vitals  Blood pressure (!) 121/92, pulse 76, temperature 36.5 °C (97.7 °F), resp. rate 17, height 1.524 m (5'), weight 82.4 kg (181 lb 10.5 oz), SpO2 95%.    Scheduled medications  aspirin, 81 mg, oral, Daily  atorvastatin, 20 mg, oral, Daily  carvedilol, 3.125 mg, oral, BID  cefTRIAXone, 1 g, intravenous, q24h  divalproex, 125 mg, oral, Daily  donepezil, 10 mg, oral, q PM  doxycycline, 100 mg, intravenous, q12h  enoxaparin, 40 mg, subcutaneous, q24h  furosemide, 40 mg, oral, BID  lisinopril, 20 mg, oral, BID  memantine, 10 mg, oral, BID  mirtazapine, 15 mg, oral, Nightly  oseltamivir, 75 mg, oral, BID  pantoprazole, 40 mg, oral, Daily before breakfast   Or  pantoprazole, 40 mg, intravenous, Daily before breakfast  polyethylene glycol, 17 g, oral, Daily    Continuous medications  amiodarone, 1 mg/min, Last Rate: 1 mg/min (02/02/25 0518)   Followed by  amiodarone, 0.5 mg/min    PRN medications  PRN medications: acetaminophen **OR** [DISCONTINUED] acetaminophen **OR** [DISCONTINUED] acetaminophen, acetaminophen **OR** [DISCONTINUED] acetaminophen **OR** [DISCONTINUED] acetaminophen, benzocaine-menthol, dextromethorphan-guaifenesin, guaiFENesin, melatonin, ondansetron ODT **OR** ondansetron, oxygen    Relevant Results  CT head wo IV contrast  Result Date: 2/1/2025  1. No acute intracranial abnormality 2. Advanced cerebral atrophy with extensive chronic microvascular changes of the white matter 3. Suspected remote right occipital lobe and right MCA territory infarcts with asymmetric volume loss in these areas similar to the prior exam   MACRO: None.   Signed by: Samira Antunez 2/1/2025 7:13 AM Dictation workstation:   [id number]    CT chest abdomen pelvis wo IV contrast  Result Date: 2/1/2025  Bilateral lower lobe clusters of inflammatory parenchymal nodules, suggestive of bacterial infection in the appropriate clinical setting. Small right pleural effusion.  Cardiomegaly. No findings of an acute process in the abdomen or pelvis. Cholelithiasis. Stent-graft repair of abdominal aortic aneurysm.  The aneurysm shows maximum diameter of 5.2 cm. Signed by Shaw Javier MD    XR chest 1 view  Result Date: 2/1/2025  Cardiomegaly with mild pulmonary congestion. Probable small bilateral pleural effusion with left linear atelectasis. Signed by Stan Garcia MD       Latest Reference Range & Units 02/01/25 04:22 02/01/25 05:49 02/01/25 18:11 02/02/25 03:49   GLUCOSE 74 - 99 mg/dL 131 (H)   115 (H)   SODIUM 136 - 145 mmol/L 138   135 (L)   POTASSIUM 3.5 - 5.3 mmol/L 4.0   3.9   CHLORIDE 98 - 107 mmol/L 98   98   Bicarbonate 21 - 32 mmol/L 31   31   Anion Gap 10 - 20 mmol/L 13   10   Blood Urea Nitrogen 6 - 23 mg/dL 18   20   Creatinine 0.50 - 1.30 mg/dL 1.03   0.85   EGFR >60 mL/min/1.73m*2 72   86   Calcium 8.6 - 10.3 mg/dL 9.0   8.3 (L)   Albumin 3.4 - 5.0 g/dL 4.2      Alkaline Phosphatase 33 - 136 U/L 137 (H)      ALT 10 - 52 U/L 39      AST 9 - 39 U/L 56 (H)      Bilirubin Total 0.0 - 1.2 mg/dL 1.2      Total Protein 6.4 - 8.2 g/dL 7.3      MAGNESIUM 1.60 - 2.40 mg/dL 1.95   1.77   Lactate 0.4 - 2.0 mmol/L 0.8      BNP 0 - 99 pg/mL 1,524 (H)      Troponin I, High Sensitivity 0 - 20 ng/L 54 (HH) 67 (HH) 67 (HH) 48 (H)      Latest Reference Range & Units 02/01/25 04:22 02/02/25 03:49   WBC 4.4 - 11.3 x10*3/uL 11.1 8.6   nRBC 0.0 - 0.0 /100 WBCs 0.0 0.0   RBC 4.50 - 5.90 x10*6/uL 5.27 4.75   HEMOGLOBIN 13.5 - 17.5 g/dL 14.9 13.5   HEMATOCRIT 41.0 - 52.0 % 48.0 43.9   MCV 80 - 100 fL 91 92   MCH 26.0 - 34.0 pg 28.3 28.4   MCHC 32.0 - 36.0 g/dL 31.0 (L) 30.8 (L)   RED CELL DISTRIBUTION WIDTH 11.5 - 14.5 % 16.7 (H) 16.8 (H)   Platelets 150 - 450 x10*3/uL 152 140 (L)     Assessment/Plan   Assessment & Plan  Influenza A    Elevated troponin    Hypoxemia    Community acquired bacterial pneumonia    CAD (coronary artery disease)    Pure hypercholesterolemia    Heart failure with mid-range  ejection fraction (HFmEF)    Hypertension, essential    Ischemic dilated cardiomyopathy (Multi)    LBBB (left bundle branch block)    Severe dementia, unspecified dementia type, unspecified whether behavioral, psychotic, or mood disturbance or anxiety    Sleep apnea    Acute hypoxic respiratory failure (Multi)      Influenza A  Hypoxemia  Community acquired bacterial pneumonia  Sleep apnea  Acute hypoxic respiratory failure (Multi)  - Flu A+  - CXR: Cardiomegaly with mild pulmonary congestion. Probable small bilateral pleural effusion with left linear atelectasis.  - CT c/a/p: Bilateral lower lobe clusters of inflammatory parenchymal nodules,  suggestive of bacterial infection in the appropriate clinical setting. Small right pleural effusion. Cardiomegaly. No findings of an acute process in the abdomen or pelvis.  Cholelithiasis. Stent-graft repair of abdominal aortic aneurysm.  The aneurysm shows  maximum diameter of 5.2 cm.  - hypoxic on arrival  - IV zosyn, vancomycin started, continue  - transitioned to ceftriaxone, doxycycline, day 1  - procal pending  - blood cultures in process  - sputum culture if able  - O2 6 liters required, baseline room air  - RT consult,  bronchial hygiene  - wean O2 as able  - pulse ox monitoring  - continue oseltamivir, duonebs, IV steroids  - follow WBC, blood cultures    Elevated troponin  CAD (coronary artery disease)  Pure hypercholesterolemia  Heart failure with mid-range ejection fraction (HFmEF)  Hypertension, essential  Ischemic dilated cardiomyopathy (Multi)  LBBB (left bundle branch block)  - Echo: 2021: 1. The left ventricular systolic function is low normal with a 45-50% estimated ejection fraction.  2. Spectral Doppler shows an abnormal pattern of left ventricular diastolic filling.  3. There is moderate mitral annular calcification.  4. There is posterior mitral leaflet prolapse with anteriorly directed mitral regurgitation jet.  5. Moderate mitral valve  regurgitation.  - EKG: SR with PAC, repeat afib RVR, wide complex  - Troponin 54 > 67 > 48  - BNP 1524  - Mag 1.77, K 3.9  - wide complex tachycardia overnight, moved to ICU  - IV amio gtt started  - telemetry  - DNR CCA, DNI  - Cardiology consulted, appreciate recs  - continue asa, atorvastatin, carvedilol, furosemide, lisinopril  - follow BMP    Severe dementia, unspecified dementia type, unspecified whether behavioral, psychotic, or mood disturbance or anxiety  - CTH: 1. No acute intracranial abnormality 2. Advanced cerebral atrophy with extensive chronic microvascular changes of the white matter 3. Suspected remote right occipital lobe and right MCA territory infarcts with asymmetric volume loss in these areas similar to the prior exam  - sitter (private pay) at bedside   - safety precautions  - continue donepezil, divalproex, memantine, mirtazapine    GI ppx: PPI  DVT ppx: enoxaparin  Fluids: prn  Electrolytes: replace as needed  Nutrition: cardiac with FR  Adjuncts: PIV  Code Status: DNR CCA, DNI  Pt requires inpatient stay at this time.    Ambreen Morgan, APRN-CNP    Attending Attestation:    I was present with the APRN-CNP who participated in the documentation of this note. I have personally seen and re-examined the patient and performed the medical decision-making components (assessment and plan of care). I have reviewed the documentation and verified the findings in the note as written with additions or exceptions as stated in the body of this note.    84 year old male with past medical history of dementia, HTN, HLD who presented to the ED for cough, congestion, fatigue x 1 day. He was hypoxic on arrival requiring 2 LPM. He was unable to provide history due to dementia. His work up showed positive for Influenza A with probably bilateral lower lobe pneumonia.  Vitals on arrival are stable  AB.38/55/80/32.5  Significant labs: BNP 1524, Troponin 54/67,   Disoriented on exam  Wheezing and rhonchi is  present.     Admitting diagnosis is  Acute hypoxic respiratory failure- On 6 LPM of oxygen, RT consult for bronchial hygiene. Pulse ox monitoring.  Influenza A- On Tamiflu  Community acquired bacterial pneumonia.- Patient is getting Doxycycline and Ceftriaxone.   Elevated troponin- Cardiology consulted.  Advanced dementia    CODE status- DNR/ DNI CCA.  Hector Rueda MD  Internal Medicine

## 2025-02-02 NOTE — CARE PLAN
The patient's goals for the shift include      The clinical goals for the shift include Patient will remain in SR this shift 2/2/25 1900.    Patient remains in SR, family and care givers at bedside. Patient pulse ox at 90% or greater on 4 liters NC at rest. Patient remains confused, bed alarm on.

## 2025-02-03 ENCOUNTER — APPOINTMENT (OUTPATIENT)
Dept: CARDIOLOGY | Facility: HOSPITAL | Age: 85
End: 2025-02-03
Payer: MEDICARE

## 2025-02-03 LAB
ANION GAP SERPL CALC-SCNC: 10 MMOL/L (ref 10–20)
ATRIAL RATE: 81 BPM
ATRIAL RATE: 97 BPM
BUN SERPL-MCNC: 27 MG/DL (ref 6–23)
CALCIUM SERPL-MCNC: 8.2 MG/DL (ref 8.6–10.3)
CHLORIDE SERPL-SCNC: 99 MMOL/L (ref 98–107)
CO2 SERPL-SCNC: 33 MMOL/L (ref 21–32)
CREAT SERPL-MCNC: 1.03 MG/DL (ref 0.5–1.3)
EGFRCR SERPLBLD CKD-EPI 2021: 72 ML/MIN/1.73M*2
EJECTION FRACTION APICAL 4 CHAMBER: 62
EJECTION FRACTION: 60 %
ERYTHROCYTE [DISTWIDTH] IN BLOOD BY AUTOMATED COUNT: 16.2 % (ref 11.5–14.5)
GLUCOSE SERPL-MCNC: 160 MG/DL (ref 74–99)
HCT VFR BLD AUTO: 43 % (ref 41–52)
HGB BLD-MCNC: 13 G/DL (ref 13.5–17.5)
LEFT VENTRICLE INTERNAL DIMENSION DIASTOLE: 4.96 CM (ref 3.5–6)
LEFT VENTRICULAR OUTFLOW TRACT DIAMETER: 2.2 CM
MCH RBC QN AUTO: 28 PG (ref 26–34)
MCHC RBC AUTO-ENTMCNC: 30.2 G/DL (ref 32–36)
MCV RBC AUTO: 93 FL (ref 80–100)
NRBC BLD-RTO: 0 /100 WBCS (ref 0–0)
P AXIS: 45 DEGREES
P AXIS: 73 DEGREES
P OFFSET: 185 MS
P OFFSET: 189 MS
P ONSET: 128 MS
P ONSET: 130 MS
PLATELET # BLD AUTO: 149 X10*3/UL (ref 150–450)
POTASSIUM SERPL-SCNC: 4.1 MMOL/L (ref 3.5–5.3)
PR INTERVAL: 160 MS
PR INTERVAL: 162 MS
PROCALCITONIN SERPL-MCNC: 2.23 NG/ML
Q ONSET: 209 MS
Q ONSET: 210 MS
Q ONSET: 213 MS
QRS COUNT: 13 BEATS
QRS COUNT: 16 BEATS
QRS COUNT: 22 BEATS
QRS DURATION: 148 MS
QT INTERVAL: 372 MS
QT INTERVAL: 444 MS
QT INTERVAL: 470 MS
QTC CALCULATION(BAZETT): 545 MS
QTC CALCULATION(BAZETT): 549 MS
QTC CALCULATION(BAZETT): 563 MS
QTC FREDERICIA: 482 MS
QTC FREDERICIA: 519 MS
QTC FREDERICIA: 520 MS
R AXIS: -50 DEGREES
R AXIS: -50 DEGREES
R AXIS: -55 DEGREES
RBC # BLD AUTO: 4.64 X10*6/UL (ref 4.5–5.9)
SODIUM SERPL-SCNC: 138 MMOL/L (ref 136–145)
T AXIS: 113 DEGREES
T AXIS: 138 DEGREES
T AXIS: 141 DEGREES
T OFFSET: 399 MS
T OFFSET: 432 MS
T OFFSET: 444 MS
TRICUSPID ANNULAR PLANE SYSTOLIC EXCURSION: 1.9 CM
VENTRICULAR RATE: 131 BPM
VENTRICULAR RATE: 81 BPM
VENTRICULAR RATE: 97 BPM
WBC # BLD AUTO: 6.4 X10*3/UL (ref 4.4–11.3)

## 2025-02-03 PROCEDURE — 94760 N-INVAS EAR/PLS OXIMETRY 1: CPT | Mod: IPSPLIT

## 2025-02-03 PROCEDURE — 2500000005 HC RX 250 GENERAL PHARMACY W/O HCPCS: Mod: IPSPLIT | Performed by: NURSE PRACTITIONER

## 2025-02-03 PROCEDURE — 93325 DOPPLER ECHO COLOR FLOW MAPG: CPT | Mod: IPSPLIT

## 2025-02-03 PROCEDURE — 51701 INSERT BLADDER CATHETER: CPT | Mod: IPSPLIT

## 2025-02-03 PROCEDURE — 94640 AIRWAY INHALATION TREATMENT: CPT | Mod: IPSPLIT

## 2025-02-03 PROCEDURE — 93308 TTE F-UP OR LMTD: CPT | Mod: IPSPLIT

## 2025-02-03 PROCEDURE — 2500000001 HC RX 250 WO HCPCS SELF ADMINISTERED DRUGS (ALT 637 FOR MEDICARE OP): Mod: IPSPLIT | Performed by: NURSE PRACTITIONER

## 2025-02-03 PROCEDURE — 85027 COMPLETE CBC AUTOMATED: CPT | Mod: IPSPLIT | Performed by: NURSE PRACTITIONER

## 2025-02-03 PROCEDURE — 2500000004 HC RX 250 GENERAL PHARMACY W/ HCPCS (ALT 636 FOR OP/ED): Mod: IPSPLIT | Performed by: EMERGENCY MEDICINE

## 2025-02-03 PROCEDURE — 2500000004 HC RX 250 GENERAL PHARMACY W/ HCPCS (ALT 636 FOR OP/ED): Mod: IPSPLIT | Performed by: NURSE PRACTITIONER

## 2025-02-03 PROCEDURE — 2500000002 HC RX 250 W HCPCS SELF ADMINISTERED DRUGS (ALT 637 FOR MEDICARE OP, ALT 636 FOR OP/ED): Mod: IPSPLIT | Performed by: NURSE PRACTITIONER

## 2025-02-03 PROCEDURE — 1200000002 HC GENERAL ROOM WITH TELEMETRY DAILY: Mod: IPSPLIT

## 2025-02-03 PROCEDURE — 36415 COLL VENOUS BLD VENIPUNCTURE: CPT | Mod: IPSPLIT | Performed by: NURSE PRACTITIONER

## 2025-02-03 PROCEDURE — 2500000004 HC RX 250 GENERAL PHARMACY W/ HCPCS (ALT 636 FOR OP/ED): Mod: IPSPLIT

## 2025-02-03 PROCEDURE — 84145 PROCALCITONIN (PCT): CPT | Mod: GENLAB | Performed by: NURSE PRACTITIONER

## 2025-02-03 PROCEDURE — 2500000001 HC RX 250 WO HCPCS SELF ADMINISTERED DRUGS (ALT 637 FOR MEDICARE OP): Mod: IPSPLIT | Performed by: INTERNAL MEDICINE

## 2025-02-03 PROCEDURE — 2500000002 HC RX 250 W HCPCS SELF ADMINISTERED DRUGS (ALT 637 FOR MEDICARE OP, ALT 636 FOR OP/ED): Mod: IPSPLIT

## 2025-02-03 PROCEDURE — 99233 SBSQ HOSP IP/OBS HIGH 50: CPT | Performed by: NURSE PRACTITIONER

## 2025-02-03 PROCEDURE — 80048 BASIC METABOLIC PNL TOTAL CA: CPT | Mod: IPSPLIT | Performed by: NURSE PRACTITIONER

## 2025-02-03 RX ORDER — CEFUROXIME AXETIL 250 MG/1
500 TABLET ORAL 2 TIMES DAILY
Status: DISCONTINUED | OUTPATIENT
Start: 2025-02-04 | End: 2025-02-04 | Stop reason: HOSPADM

## 2025-02-03 RX ORDER — PREDNISONE 20 MG/1
20 TABLET ORAL DAILY
Status: DISCONTINUED | OUTPATIENT
Start: 2025-02-04 | End: 2025-02-04 | Stop reason: HOSPADM

## 2025-02-03 RX ORDER — PREDNISONE 5 MG/1
5 TABLET ORAL DAILY
Status: DISCONTINUED | OUTPATIENT
Start: 2025-02-10 | End: 2025-02-04 | Stop reason: HOSPADM

## 2025-02-03 RX ORDER — PREDNISONE 5 MG/1
15 TABLET ORAL DAILY
Status: DISCONTINUED | OUTPATIENT
Start: 2025-02-06 | End: 2025-02-04 | Stop reason: HOSPADM

## 2025-02-03 RX ORDER — AMIODARONE HYDROCHLORIDE 200 MG/1
200 TABLET ORAL DAILY
Status: DISCONTINUED | OUTPATIENT
Start: 2025-02-03 | End: 2025-02-03 | Stop reason: SDUPTHER

## 2025-02-03 RX ORDER — AMIODARONE HYDROCHLORIDE 200 MG/1
200 TABLET ORAL DAILY
Status: DISCONTINUED | OUTPATIENT
Start: 2025-02-03 | End: 2025-02-04 | Stop reason: HOSPADM

## 2025-02-03 RX ORDER — DOXYCYCLINE HYCLATE 100 MG
100 TABLET ORAL EVERY 12 HOURS SCHEDULED
Status: DISCONTINUED | OUTPATIENT
Start: 2025-02-03 | End: 2025-02-04 | Stop reason: HOSPADM

## 2025-02-03 RX ORDER — PREDNISONE 5 MG/1
10 TABLET ORAL DAILY
Status: DISCONTINUED | OUTPATIENT
Start: 2025-02-08 | End: 2025-02-04 | Stop reason: HOSPADM

## 2025-02-03 RX ADMIN — GUAIFENESIN 600 MG: 600 TABLET, EXTENDED RELEASE ORAL at 09:42

## 2025-02-03 RX ADMIN — DOXYCYCLINE 100 MG: 100 INJECTION, POWDER, LYOPHILIZED, FOR SOLUTION INTRAVENOUS at 06:44

## 2025-02-03 RX ADMIN — DOXYCYCLINE HYCLATE 100 MG: 100 TABLET, COATED ORAL at 20:32

## 2025-02-03 RX ADMIN — ASPIRIN 81 MG CHEWABLE TABLET 81 MG: 81 TABLET CHEWABLE at 09:42

## 2025-02-03 RX ADMIN — DONEPEZIL HYDROCHLORIDE 10 MG: 5 TABLET, FILM COATED ORAL at 20:32

## 2025-02-03 RX ADMIN — Medication 4 L/MIN: at 08:49

## 2025-02-03 RX ADMIN — APIXABAN 5 MG: 5 TABLET, FILM COATED ORAL at 09:42

## 2025-02-03 RX ADMIN — LISINOPRIL 20 MG: 20 TABLET ORAL at 09:42

## 2025-02-03 RX ADMIN — DIVALPROEX SODIUM 125 MG: 125 TABLET, DELAYED RELEASE ORAL at 09:42

## 2025-02-03 RX ADMIN — OSELTAMIVIR PHOSPHATE 75 MG: 75 CAPSULE ORAL at 20:36

## 2025-02-03 RX ADMIN — IPRATROPIUM BROMIDE AND ALBUTEROL SULFATE 3 ML: .5; 3 SOLUTION RESPIRATORY (INHALATION) at 08:49

## 2025-02-03 RX ADMIN — METHYLPREDNISOLONE SODIUM SUCCINATE 40 MG: 40 INJECTION, POWDER, FOR SOLUTION INTRAMUSCULAR; INTRAVENOUS at 09:43

## 2025-02-03 RX ADMIN — METHYLPREDNISOLONE SODIUM SUCCINATE 40 MG: 40 INJECTION, POWDER, FOR SOLUTION INTRAMUSCULAR; INTRAVENOUS at 02:53

## 2025-02-03 RX ADMIN — MEMANTINE 10 MG: 5 TABLET ORAL at 20:32

## 2025-02-03 RX ADMIN — METHYLPREDNISOLONE SODIUM SUCCINATE 40 MG: 40 INJECTION, POWDER, FOR SOLUTION INTRAMUSCULAR; INTRAVENOUS at 17:12

## 2025-02-03 RX ADMIN — POLYETHYLENE GLYCOL 3350 17 G: 17 POWDER, FOR SOLUTION ORAL at 09:43

## 2025-02-03 RX ADMIN — AMIODARONE HYDROCHLORIDE 200 MG: 200 TABLET ORAL at 09:45

## 2025-02-03 RX ADMIN — IPRATROPIUM BROMIDE AND ALBUTEROL SULFATE 3 ML: .5; 3 SOLUTION RESPIRATORY (INHALATION) at 15:58

## 2025-02-03 RX ADMIN — MEMANTINE 10 MG: 5 TABLET ORAL at 09:43

## 2025-02-03 RX ADMIN — MIRTAZAPINE 15 MG: 15 TABLET, FILM COATED ORAL at 20:32

## 2025-02-03 RX ADMIN — CARVEDILOL 3.12 MG: 3.12 TABLET, FILM COATED ORAL at 09:42

## 2025-02-03 RX ADMIN — FUROSEMIDE 40 MG: 10 INJECTION, SOLUTION INTRAMUSCULAR; INTRAVENOUS at 06:43

## 2025-02-03 RX ADMIN — IPRATROPIUM BROMIDE AND ALBUTEROL SULFATE 3 ML: .5; 3 SOLUTION RESPIRATORY (INHALATION) at 20:43

## 2025-02-03 RX ADMIN — CEFTRIAXONE 1 G: 1 INJECTION, SOLUTION INTRAVENOUS at 14:35

## 2025-02-03 RX ADMIN — Medication 4 L/MIN: at 20:43

## 2025-02-03 RX ADMIN — PANTOPRAZOLE SODIUM 40 MG: 40 INJECTION, POWDER, LYOPHILIZED, FOR SOLUTION INTRAVENOUS at 06:44

## 2025-02-03 RX ADMIN — AMIODARONE HYDROCHLORIDE 0.5 MG/MIN: 1.8 INJECTION, SOLUTION INTRAVENOUS at 02:53

## 2025-02-03 RX ADMIN — APIXABAN 5 MG: 5 TABLET, FILM COATED ORAL at 20:32

## 2025-02-03 RX ADMIN — OSELTAMIVIR PHOSPHATE 75 MG: 75 CAPSULE ORAL at 09:43

## 2025-02-03 RX ADMIN — ATORVASTATIN CALCIUM 20 MG: 10 TABLET, FILM COATED ORAL at 09:42

## 2025-02-03 ASSESSMENT — PAIN SCALES - GENERAL
PAINLEVEL_OUTOF10: 0 - NO PAIN
PAINLEVEL_OUTOF10: 0 - NO PAIN

## 2025-02-03 ASSESSMENT — ACTIVITIES OF DAILY LIVING (ADL): LACK_OF_TRANSPORTATION: PATIENT UNABLE TO ANSWER

## 2025-02-03 NOTE — PROGRESS NOTES
Blu Grigsby is a 84 y.o. male on day 2 of admission presenting with Influenza A.    Subjective   He is resting in bed, caregiver at bedside. When he wakes he is alert, pleasantly confused. He has no complaints today. Caregiver states he slept OK. Transitioning to oral amiodarone today, still on 4 liters NC. Continue to monitor.      Objective     Physical Exam  Constitutional:       General: He is not in acute distress.     Appearance: Normal appearance. He is not toxic-appearing.   HENT:      Head: Normocephalic and atraumatic.      Mouth/Throat:      Mouth: Mucous membranes are moist.   Eyes:      Extraocular Movements: Extraocular movements intact.      Pupils: Pupils are equal, round, and reactive to light.   Cardiovascular:      Rate and Rhythm: Normal rate and regular rhythm.      Pulses: Normal pulses.      Heart sounds: Normal heart sounds. No murmur heard.     No gallop.   Pulmonary:      Effort: Pulmonary effort is normal. No respiratory distress.      Breath sounds: Wheezing and rhonchi present. No rales.   Abdominal:      General: Bowel sounds are normal. There is no distension.      Palpations: Abdomen is soft.      Tenderness: There is no abdominal tenderness. There is no guarding or rebound.   Musculoskeletal:         General: No swelling, tenderness, deformity or signs of injury. Normal range of motion.      Cervical back: Normal range of motion and neck supple.   Skin:     General: Skin is warm and dry.      Capillary Refill: Capillary refill takes less than 2 seconds.      Coloration: Skin is not jaundiced.      Findings: No bruising or rash.   Neurological:      General: No focal deficit present.      Mental Status: He is alert. Mental status is at baseline. He is disoriented.      Cranial Nerves: No cranial nerve deficit.      Sensory: No sensory deficit.   Psychiatric:         Mood and Affect: Mood normal.         Behavior: Behavior normal.      Last Recorded Vitals  Blood pressure 101/60,  pulse 59, temperature 36.4 °C (97.5 °F), temperature source Temporal, resp. rate 11, height 1.524 m (5'), weight 82.4 kg (181 lb 10.5 oz), SpO2 93%.  Intake/Output last 3 Shifts:  I/O last 3 completed shifts:  In: 399.4 (4.8 mL/kg) [I.V.:149.4 (1.8 mL/kg); IV Piggyback:250]  Out: 300 (3.6 mL/kg) [Urine:300 (0.1 mL/kg/hr)]  Weight: 82.4 kg     Scheduled medications  apixaban, 5 mg, oral, BID  aspirin, 81 mg, oral, Daily  atorvastatin, 20 mg, oral, Daily  carvedilol, 3.125 mg, oral, BID  cefTRIAXone, 1 g, intravenous, q24h  divalproex, 125 mg, oral, Daily  donepezil, 10 mg, oral, q PM  doxycycline, 100 mg, intravenous, q12h  furosemide, 40 mg, intravenous, q8h  [Held by provider] furosemide, 40 mg, oral, BID  ipratropium-albuteroL, 3 mL, nebulization, TID  lisinopril, 20 mg, oral, BID  memantine, 10 mg, oral, BID  methylPREDNISolone sodium succinate (PF), 40 mg, intravenous, q8h  mirtazapine, 15 mg, oral, Nightly  oseltamivir, 75 mg, oral, BID  pantoprazole, 40 mg, oral, Daily before breakfast   Or  pantoprazole, 40 mg, intravenous, Daily before breakfast  polyethylene glycol, 17 g, oral, Daily    Continuous medications  amiodarone, 0.5 mg/min, Last Rate: 0.5 mg/min (02/03/25 0526)    PRN medications  PRN medications: acetaminophen **OR** [DISCONTINUED] acetaminophen **OR** [DISCONTINUED] acetaminophen, acetaminophen **OR** [DISCONTINUED] acetaminophen **OR** [DISCONTINUED] acetaminophen, albuterol, benzocaine-menthol, dextromethorphan-guaifenesin, guaiFENesin, melatonin, ondansetron ODT **OR** ondansetron, oxygen    Relevant Results  CT head wo IV contrast  Result Date: 2/1/2025  1. No acute intracranial abnormality 2. Advanced cerebral atrophy with extensive chronic microvascular changes of the white matter 3. Suspected remote right occipital lobe and right MCA territory infarcts with asymmetric volume loss in these areas similar to the prior exam   MACRO: None.   Signed by: Samira Antunez 2/1/2025 7:13 AM Dictation  workstation:   [id number]    CT chest abdomen pelvis wo IV contrast  Result Date: 2/1/2025  Bilateral lower lobe clusters of inflammatory parenchymal nodules, suggestive of bacterial infection in the appropriate clinical setting. Small right pleural effusion. Cardiomegaly. No findings of an acute process in the abdomen or pelvis. Cholelithiasis. Stent-graft repair of abdominal aortic aneurysm.  The aneurysm shows maximum diameter of 5.2 cm. Signed by Shaw Javier MD    XR chest 1 view  Result Date: 2/1/2025  Cardiomegaly with mild pulmonary congestion. Probable small bilateral pleural effusion with left linear atelectasis. Signed by Stan Garcia MD      Latest Reference Range & Units 02/02/25 03:49 02/03/25 05:27   GLUCOSE 74 - 99 mg/dL 115 (H) 160 (H)   SODIUM 136 - 145 mmol/L 135 (L) 138   POTASSIUM 3.5 - 5.3 mmol/L 3.9 4.1   CHLORIDE 98 - 107 mmol/L 98 99   Bicarbonate 21 - 32 mmol/L 31 33 (H)   Anion Gap 10 - 20 mmol/L 10 10   Blood Urea Nitrogen 6 - 23 mg/dL 20 27 (H)   Creatinine 0.50 - 1.30 mg/dL 0.85 1.03   EGFR >60 mL/min/1.73m*2 86 72   Calcium 8.6 - 10.3 mg/dL 8.3 (L) 8.2 (L)   MAGNESIUM 1.60 - 2.40 mg/dL 1.77    Troponin I, High Sensitivity 0 - 20 ng/L 48 (H)    WBC 4.4 - 11.3 x10*3/uL 8.6 6.4   nRBC 0.0 - 0.0 /100 WBCs 0.0 0.0   RBC 4.50 - 5.90 x10*6/uL 4.75 4.64   HEMOGLOBIN 13.5 - 17.5 g/dL 13.5 13.0 (L)   HEMATOCRIT 41.0 - 52.0 % 43.9 43.0   MCV 80 - 100 fL 92 93   MCH 26.0 - 34.0 pg 28.4 28.0   MCHC 32.0 - 36.0 g/dL 30.8 (L) 30.2 (L)   RED CELL DISTRIBUTION WIDTH 11.5 - 14.5 % 16.8 (H) 16.2 (H)   Platelets 150 - 450 x10*3/uL 140 (L) 149 (L)     Assessment/Plan   Assessment & Plan  Influenza A    Elevated troponin    Hypoxemia    Community acquired bacterial pneumonia    CAD (coronary artery disease)    Pure hypercholesterolemia    Heart failure with mid-range ejection fraction (HFmEF)    Hypertension, essential    Ischemic dilated cardiomyopathy (Multi)    LBBB (left bundle branch block)    Severe  dementia, unspecified dementia type, unspecified whether behavioral, psychotic, or mood disturbance or anxiety    Sleep apnea    Acute hypoxic respiratory failure (Multi)      Influenza A  Hypoxemia  Community acquired bacterial pneumonia  Sleep apnea  Acute hypoxic respiratory failure (Multi)  - Flu A+  - CXR: Cardiomegaly with mild pulmonary congestion. Probable small bilateral pleural effusion with left linear atelectasis.  - CT c/a/p: Bilateral lower lobe clusters of inflammatory parenchymal nodules, suggestive of bacterial infection in the appropriate clinical setting. Small right pleural effusion. Cardiomegaly. No findings of an acute process in the abdomen or pelvis. Cholelithiasis. Stent-graft repair of abdominal aortic aneurysm.  The aneurysm shows maximum diameter of 5.2 cm.  - hypoxic on arrival  - IV zosyn, vancomycin started, continue  - transitioned to ceftriaxone, doxycycline, day 2  - procal 3.67  - blood cultures NGTD  - sputum culture if able  - O2 4 liters required, baseline room air/2 liters PRN?  - RT consult,  bronchial hygiene  - wean O2 as able  - pulse ox monitoring  - continue oseltamivir, duonebs, IV steroids  - follow WBC, blood cultures     Elevated troponin  CAD (coronary artery disease)  Pure hypercholesterolemia  Heart failure with mid-range ejection fraction (HFmEF)  Hypertension, essential  Ischemic dilated cardiomyopathy (Multi)  LBBB (left bundle branch block)  - Echo: 2021: 1. The left ventricular systolic function is low normal with a 45-50% estimated ejection fraction.  2. Spectral Doppler shows an abnormal pattern of left ventricular diastolic filling.  3. There is moderate mitral annular calcification.  4. There is posterior mitral leaflet prolapse with anteriorly directed mitral regurgitation jet.  5. Moderate mitral valve regurgitation.  - EKG: SR with PAC, repeat afib RVR, wide complex  - Troponin 54 > 67 > 48  - BNP 1524  - Mag 1.77, K 4.1  - wide complex tachycardia  overnight, moved to ICU  - IV amio gtt started > transitioning to oral amiodarone today  - telemetry  - DNR CCA, DNI  - Cardiology consulted, appreciate recs  - continue asa, atorvastatin, carvedilol, furosemide, lisinopril  - follow BMP     Severe dementia, unspecified dementia type, unspecified whether behavioral, psychotic, or mood disturbance or anxiety  - CTH: 1. No acute intracranial abnormality 2. Advanced cerebral atrophy with extensive chronic microvascular changes of the white matter 3. Suspected remote right occipital lobe and right MCA territory infarcts with asymmetric volume loss in these areas similar to the prior exam  - sitter (private pay) at bedside   - safety precautions  - continue donepezil, divalproex, memantine, mirtazapine     GI ppx: PPI  DVT ppx: enoxaparin  Fluids: prn  Electrolytes: replace as needed  Nutrition: cardiac with FR  Adjuncts: PIV  Code Status: DNR CCA, DNI  Pt requires inpatient stay at this time.    Ambreen Morgan, APRN-CNP

## 2025-02-03 NOTE — PROGRESS NOTES
Blu Grigsby is a 84 y.o. male on day 2 of admission presenting with Influenza A.      Subjective   He is resting in the bed, drowsy, opens his eyes briefly to answer questions. Currently on O2 at 4L per NC. Still with slight shortness of breath, no chest pain.     Telemetry SR           Objective   Constitutional: Well developed, drowsy, no distress, cooperative  Eyes: PERRL, EOMI, clear sclera  ENMT: mucous membranes moist, no apparent injury, no lesions seen  Head/Neck: Neck supple, no apparent injury, thyroid without mass or tenderness, No JVD, trachea midline, no bruits  Respiratory/Thorax: Patent airways, rales throughout with good chest expansion, thorax symmetric  Cardiovascular: Regular, rate and rhythm, 2/6 systolic murmur LSB, 2+ equal pulses of the extremities, normal S 1and S 2  Gastrointestinal: Nondistended, soft, non-tender, no rebound tenderness or guarding, no masses palpable, no organomegaly, +BS, no bruits  Musculoskeletal: ROM intact, no joint swelling, normal strength  Extremities: normal extremities, no cyanosis edema, contusions or wounds, no clubbing  Neurological: drowsy, awakens briefly to answer questions  Lymphatic: No significant lymphadenopathy  Psychological: Appropriate mood and behavior  Skin: Warm and dry, no lesions, no rashes      Last Recorded Vitals  BP 90/51   Pulse 59   Temp 35.8 °C (96.4 °F) (Temporal)   Resp 12   Ht 1.524 m (5')   Wt 80.4 kg (177 lb 4 oz)   SpO2 90%   BMI 34.62 kg/m²     Intake/Output last 3 Shifts:  I/O last 3 completed shifts:  In: 1035.5 (12.6 mL/kg) [I.V.:548.9 (6.7 mL/kg); IV Piggyback:486.6]  Out: 1150 (14 mL/kg) [Urine:1150 (0.4 mL/kg/hr)]  Weight: 82.4 kg   I/O this shift:  In: 66.7 [I.V.:66.7]  Out: 700 [Urine:700]    Relevant Results  Scheduled medications  amiodarone, 200 mg, oral, Daily  apixaban, 5 mg, oral, BID  aspirin, 81 mg, oral, Daily  atorvastatin, 20 mg, oral, Daily  carvedilol, 3.125 mg, oral, BID  cefTRIAXone, 1 g,  intravenous, q24h  divalproex, 125 mg, oral, Daily  donepezil, 10 mg, oral, q PM  doxycylcine, 100 mg, oral, q12h ANDREY  [Held by provider] furosemide, 40 mg, oral, BID  ipratropium-albuteroL, 3 mL, nebulization, TID  lisinopril, 20 mg, oral, BID  memantine, 10 mg, oral, BID  methylPREDNISolone sodium succinate (PF), 40 mg, intravenous, q8h  mirtazapine, 15 mg, oral, Nightly  oseltamivir, 75 mg, oral, BID  pantoprazole, 40 mg, oral, Daily before breakfast  polyethylene glycol, 17 g, oral, Daily      Continuous medications     PRN medications  PRN medications: acetaminophen **OR** [DISCONTINUED] acetaminophen **OR** [DISCONTINUED] acetaminophen, acetaminophen **OR** [DISCONTINUED] acetaminophen **OR** [DISCONTINUED] acetaminophen, albuterol, benzocaine-menthol, dextromethorphan-guaifenesin, guaiFENesin, melatonin, ondansetron ODT **OR** ondansetron, oxygen    Results for orders placed or performed during the hospital encounter of 02/01/25 (from the past 24 hours)   CBC   Result Value Ref Range    WBC 6.4 4.4 - 11.3 x10*3/uL    nRBC 0.0 0.0 - 0.0 /100 WBCs    RBC 4.64 4.50 - 5.90 x10*6/uL    Hemoglobin 13.0 (L) 13.5 - 17.5 g/dL    Hematocrit 43.0 41.0 - 52.0 %    MCV 93 80 - 100 fL    MCH 28.0 26.0 - 34.0 pg    MCHC 30.2 (L) 32.0 - 36.0 g/dL    RDW 16.2 (H) 11.5 - 14.5 %    Platelets 149 (L) 150 - 450 x10*3/uL   Basic Metabolic Panel   Result Value Ref Range    Glucose 160 (H) 74 - 99 mg/dL    Sodium 138 136 - 145 mmol/L    Potassium 4.1 3.5 - 5.3 mmol/L    Chloride 99 98 - 107 mmol/L    Bicarbonate 33 (H) 21 - 32 mmol/L    Anion Gap 10 10 - 20 mmol/L    Urea Nitrogen 27 (H) 6 - 23 mg/dL    Creatinine 1.03 0.50 - 1.30 mg/dL    eGFR 72 >60 mL/min/1.73m*2    Calcium 8.2 (L) 8.6 - 10.3 mg/dL   Transthoracic Echo (TTE) Limited   Result Value Ref Range    LVOT diam 2.20 cm    Tricuspid annular plane systolic excursion 1.9 cm    LV EF 60 %    LVIDd 4.96 cm    LV A4C EF 62.0        Transthoracic Echo (TTE) Limited   Final  Result      CT chest abdomen pelvis wo IV contrast   Final Result   Bilateral lower lobe clusters of inflammatory parenchymal nodules,   suggestive of bacterial infection in the appropriate clinical setting.   Small right pleural effusion.   Cardiomegaly.   No findings of an acute process in the abdomen or pelvis.   Cholelithiasis.   Stent-graft repair of abdominal aortic aneurysm.  The aneurysm shows   maximum diameter of 5.2 cm.   Signed by Shaw Javier MD      CT head wo IV contrast   Final Result   1. No acute intracranial abnormality   2. Advanced cerebral atrophy with extensive chronic microvascular   changes of the white matter   3. Suspected remote right occipital lobe and right MCA territory   infarcts with asymmetric volume loss in these areas similar to the   prior exam        MACRO:   None.        Signed by: Samira Antunez 2/1/2025 7:13 AM   Dictation workstation:   [id number]      XR chest 1 view   Final Result   Cardiomegaly with mild pulmonary congestion.   Probable small bilateral pleural effusion with left linear   atelectasis.   Signed by Stan Garcia MD          Transthoracic Echo (TTE) Limited    Result Date: 2/3/2025   [facility], [address] [place] [address]              Tel [phone] and Fax [phone] TRANSTHORACIC ECHOCARDIOGRAM REPORT  Patient Name:       GEOVANI KULKARNI      Reading Physician:    Sherin Kaplan MD Study Date:         2/3/2025            Ordering Provider:    Sherin KAPLAN MRN/PID:            [id number]            Fellow: Accession#:         [id number]        Nurse: Date of Birth/Age:  1940 / 84 years Sonographer:          Doris Verdin RDCS Gender assigned at  M                   Additional Staff: Birth: Height:             152.40  cm           Admit Date: Weight:             82.10 kg            Admission Status:     Inpatient -                                                               Routine BSA / BMI:          1.79 m2 / 35.35     Encounter#:           [id number]                     kg/m2 Blood Pressure:     101/60 mmHg         Department Location:  [facility] Study Type:    TRANSTHORACIC ECHO (TTE) LIMITED Diagnosis/ICD: Unspecified atrial fibrillation-I48.91 Indication:    HFmEF CPT Code:      Echo Limited-21841; Color Doppler-38182 Patient History: Valve Disorders:   Mitral Regurgitation. Pertinent History: CAD, HTN, Cardiomyopathy, CHF, A-Fib and Dyspnea. LBBB. Study Detail: The following Echo studies were performed: 2D, M-Mode, Doppler and               color flow. The patient was awake.  PHYSICIAN INTERPRETATION: Left Ventricle: The left ventricular systolic function is normal, with a visually estimated ejection fraction of 60%. There is moderate concentric left ventricular hypertrophy. There are no regional left ventricular wall motion abnormalities. The left ventricular cavity size is normal. There is severely increased septal and severely increased posterior left ventricular wall thickness. Spectral Doppler shows a Grade I (impaired relaxation pattern) of left ventricular diastolic filling with normal left atrial filling pressure. Left Atrium: The left atrial size is moderate to severely dilated. Right Ventricle: The right ventricle is mildly enlarged. There is mildly reduced right ventricular systolic function. Right Atrium: The right atrium is moderately dilated. Aortic Valve: The aortic valve is trileaflet. There is mild aortic valve cusp calcification. There is no evidence of aortic valve regurgitation. Mitral Valve: The mitral valve is mildly thickened with a myxomatous appearance. There is mitral valve prolapse. There is moderate holosystolic mitral valve prolapse of the posterior leaflet. There is moderate mitral valve  regurgitation which is anteriorly directed. Tricuspid Valve: The tricuspid valve is structurally normal. There is mild tricuspid regurgitation. Pulmonic Valve: The pulmonic valve is structurally normal. There is physiologic pulmonic valve regurgitation. Pericardium: Trivial to small pericardial effusion. Aorta: The aortic root is normal. Pulmonary Veins: The pulmonary veins appear normal and return normally to the left atrium. Systemic Veins: The inferior vena cava appears normal in size, with IVC inspiratory collapse less than 50%.  CONCLUSIONS:  1. The left ventricular systolic function is normal, with a visually estimated ejection fraction of 60%.  2. Spectral Doppler shows a Grade I (impaired relaxation pattern) of left ventricular diastolic filling with normal left atrial filling pressure.  3. There is severely increased septal and severely increased posterior left ventricular wall thickness.  4. There is moderate concentric left ventricular hypertrophy.  5. There is mildly reduced right ventricular systolic function.  6. Mildly enlarged right ventricle.  7. The left atrial size is moderate to severely dilated.  8. The right atrium is moderately dilated.  9. Moderate mitral valve regurgitation. Eccentric anteriorly directed jet. 10. There is moderate posterior mitral valve leaflet prolapse. QUANTITATIVE DATA SUMMARY:  2D MEASUREMENTS:          Normal Ranges: Ao Root d:       2.80 cm  (2.0-3.7cm) LAs:             5.90 cm  (2.7-4.0cm) IVSd:            1.78 cm  (0.6-1.1cm) LVPWd:           1.65 cm  (0.6-1.1cm) LVIDd:           4.96 cm  (3.9-5.9cm) LVIDs:           3.45 cm LV Mass Index:   218 g/m2 LVEDV Index:     84 ml/m2 LV % FS          30.4 %  AORTA MEASUREMENTS:         Normal Ranges: Asc Ao, d:          3.70 cm (2.1-3.4cm)  LV SYSTOLIC FUNCTION:                      Normal Ranges: EF-A4C View:    62 % (>=55%) EF-A2C View:    59 % EF-Biplane:     62 % EF-Visual:      60 % LV EF Reported: 60 %  AORTIC VALVE:           Normal Ranges: LVOT Diameter: 2.20 cm (1.8-2.4cm)  RIGHT VENTRICLE: TAPSE: 18.9 mm  TRICUSPID VALVE/RVSP:         Normal Ranges: IVC Diam:             1.70 cm  [id number] Elliott Kaplan MD Electronically signed on 2/3/2025 at 12:31:34 PM  ** Final **           Assessment/Plan   Assessment & Plan  Influenza A    CAD (coronary artery disease)    Severe dementia, unspecified dementia type, unspecified whether behavioral, psychotic, or mood disturbance or anxiety    Hypertension, essential    Ischemic dilated cardiomyopathy (Multi)    LBBB (left bundle branch block)    Elevated troponin    Pure hypercholesterolemia    Sleep apnea    Community acquired bacterial pneumonia    Hypoxemia    Heart failure with mid-range ejection fraction (HFmEF)    Acute hypoxic respiratory failure (Multi)      Patient has been admitted to the hospital with acute hypoxic respiratory failure due to pneumonia, influenza A infection.  Patient has been placed on broad-spectrum antibiotics, oxygen, bronchodilators, steroids, oseltamivir.     He has known coronary artery disease which is currently stable and he has no active angina pectoris.  Elevated troponin likely due to subendocardial stress, demand injury in the setting of acute infection.     BNP level is elevated but there is no current evidence of volume overload.     He had rapid response due to atrial fibrillation with rapid ventricular rate, started on intravenous amiodarone and currently in sinus rhythm.  Change IV amio to amiodarone 200 mg PO daily.  Elevated risk for cardioembolic stroke, cont apixaban 5 mg twice daily.  For his coronary artery disease he will continue on low-dose aspirin, beta-blockers, continue atorvastatin for hyperlipidemia continue ACE inhibitors and diuretics for congestive heart failure.  Closely monitor electrolytes, renal function.     He has chronic left bundle branch block no evidence of any symptomatic bradycardia.  Most recent echocardiogram was  completed in September 2021 showed moderate septal hypertrophy, posterior mitral valve prolapse, moderate mitral regurgitation, normal ejection fraction.  Echocardiogram will be repeated today.      Rose Alfaro, APRN-CNP

## 2025-02-03 NOTE — CARE PLAN
The patient's goals for the shift include      The clinical goals for the shift include Pt will have adaquate urine output of >30 mL per hour.      Problem: Pain - Adult  Goal: Verbalizes/displays adequate comfort level or baseline comfort level  Outcome: Progressing  Flowsheets (Taken 2/2/2025 2106)  Verbalizes/displays adequate comfort level or baseline comfort level:   Encourage patient to monitor pain and request assistance   Assess pain using appropriate pain scale   Administer analgesics based on type and severity of pain and evaluate response   Implement non-pharmacological measures as appropriate and evaluate response   Consider cultural and social influences on pain and pain management   Notify Licensed Independent Practitioner if interventions unsuccessful or patient reports new pain     Problem: Safety - Adult  Goal: Free from fall injury  Outcome: Progressing  Flowsheets (Taken 2/2/2025 2106)  Free from fall injury:   Instruct family/caregiver on patient safety   Based on caregiver fall risk screen, instruct family/caregiver to ask for assistance with transferring infant if caregiver noted to have fall risk factors     Problem: Discharge Planning  Goal: Discharge to home or other facility with appropriate resources  Outcome: Progressing  Flowsheets (Taken 2/2/2025 2106)  Discharge to home or other facility with appropriate resources:   Identify barriers to discharge with patient and caregiver   Identify discharge learning needs (meds, wound care, etc)   Arrange for needed discharge resources and transportation as appropriate   Arrange for interpreters to assist at discharge as needed   Refer to discharge planning if patient needs post-hospital services based on physician order or complex needs related to functional status, cognitive ability or social support system     Problem: Chronic Conditions and Co-morbidities  Goal: Patient's chronic conditions and co-morbidity symptoms are monitored and maintained  or improved  Outcome: Progressing  Flowsheets (Taken 2/2/2025 2106)  Care Plan - Patient's Chronic Conditions and Co-Morbidity Symptoms are Monitored and Maintained or Improved:   Collaborate with multidisciplinary team to address chronic and comorbid conditions and prevent exacerbation or deterioration   Monitor and assess patient's chronic conditions and comorbid symptoms for stability, deterioration, or improvement   Update acute care plan with appropriate goals if chronic or comorbid symptoms are exacerbated and prevent overall improvement and discharge     Problem: Nutrition  Goal: Nutrient intake appropriate for maintaining nutritional needs  Outcome: Progressing     Problem: Skin  Goal: Decreased wound size/increased tissue granulation at next dressing change  Outcome: Progressing  Flowsheets (Taken 2/2/2025 2106)  Decreased wound size/increased tissue granulation at next dressing change:   Promote sleep for wound healing   Protective dressings over bony prominences   Utilize specialty bed per algorithm  Goal: Participates in plan/prevention/treatment measures  Outcome: Progressing  Flowsheets (Taken 2/2/2025 2106)  Participates in plan/prevention/treatment measures:   Discuss with provider PT/OT consult   Elevate heels   Increase activity/out of bed for meals  Goal: Prevent/manage excess moisture  Outcome: Progressing  Flowsheets (Taken 2/2/2025 2106)  Prevent/manage excess moisture:   Cleanse incontinence/protect with barrier cream   Monitor for/manage infection if present   Follow provider orders for dressing changes   Use wicking fabric (obtain order)   Moisturize dry skin  Goal: Prevent/minimize sheer/friction injuries  Outcome: Progressing  Flowsheets (Taken 2/2/2025 2106)  Prevent/minimize sheer/friction injuries:   Complete micro-shifts as needed if patient unable. Adjust patient position to relieve pressure points, not a full turn   Increase activity/out of bed for meals   Use pull sheet   HOB 30  degrees or less   Utilize specialty bed per algorithm   Turn/reposition every 2 hours/use positioning/transfer devices  Goal: Promote/optimize nutrition  Outcome: Progressing  Flowsheets (Taken 2/2/2025 2106)  Promote/optimize nutrition:   Assist with feeding   Monitor/record intake including meals   Consume > 50% meals/supplements   Offer water/supplements/favorite foods   Discuss with provider if NPO > 2 days   Reassess MST if dietician not consulted  Goal: Promote skin healing  Outcome: Progressing  Flowsheets (Taken 2/2/2025 2106)  Promote skin healing:   Protective dressings over bony prominences   Assess skin/pad under line(s)/device(s)   Turn/reposition every 2 hours/use positioning/transfer devices   Ensure correct size (line/device) and apply per  instructions     Problem: Pain  Goal: Takes deep breaths with improved pain control throughout the shift  Outcome: Progressing  Goal: Turns in bed with improved pain control throughout the shift  Outcome: Progressing  Goal: Walks with improved pain control throughout the shift  Outcome: Progressing  Goal: Performs ADL's with improved pain control throughout shift  Outcome: Progressing  Goal: Participates in PT with improved pain control throughout the shift  Outcome: Progressing  Goal: Free from opioid side effects throughout the shift  Outcome: Progressing  Goal: Free from acute confusion related to pain meds throughout the shift  Outcome: Progressing     Problem: Fall/Injury  Goal: Not fall by end of shift  Outcome: Progressing  Goal: Be free from injury by end of the shift  Outcome: Progressing  Goal: Verbalize understanding of personal risk factors for fall in the hospital  Outcome: Progressing  Goal: Verbalize understanding of risk factor reduction measures to prevent injury from fall in the home  Outcome: Progressing  Goal: Use assistive devices by end of the shift  Outcome: Progressing  Goal: Pace activities to prevent fatigue by end of the  shift  Outcome: Progressing

## 2025-02-03 NOTE — CARE PLAN
The patient's goals for the shift include  unable to assess     The clinical goals for the shift include Patient pulse ox will be maintained at 90% or greater on 2 liters NC this shift 2/3/25 1900.    Over the shift, the patient did make progress toward the following goals. Assumed care of patient at 1500. Personal caregiver at bedside. Patient continues on antibiotics and steroids. Patient drowsy and dependent with ADLs. Purewick in place. No s/s of pain noted.

## 2025-02-03 NOTE — PROGRESS NOTES
02/03/25 1441   Discharge Planning   Living Arrangements Alone   Support Systems Children;Home care staff   Assistance Needed Per records, patient lives alone but has family and caregivers.   DME:  Cane, walker, raised toilet, walkin shower with chair, grab bars, and home oxygen (2L) Patient lives in a 2 story house but utilizes the main floor.   Type of Residence Private residence   Number of Stairs to Enter Residence 1   Home or Post Acute Services In home services   Type of Home Care Services Home PT;Home OT;DME or oxygen   Expected Discharge Disposition Home Heal   Financial Resource Strain   How hard is it for you to pay for the very basics like food, housing, medical care, and heating? Pt Unable   Housing Stability   In the last 12 months, was there a time when you were not able to pay the mortgage or rent on time? Pt Unable   In the past 12 months, how many times have you moved where you were living? 0   At any time in the past 12 months, were you homeless or living in a shelter (including now)? Pt Unable   Transportation Needs   In the past 12 months, has lack of transportation kept you from meetings, work, or from getting things needed for daily living? Pt Unable   Stroke Family Assessment   Stroke Family Assessment Needed No   Intensity of Service   Intensity of Service >30 min     2:30 pm  Spoke with caregiver at bedside and she will contact daughter Romana and have her call or stop by to see TCC. TCC following.      2:55 pm  Confirmed with daughter that patient has 24/7 care.  Either her or her brother are at the home.  The caregiver works  6 AM - 5 pm on days they can't be there.  She would prefer patient to go home 2/2  his dementia.  TCC following.

## 2025-02-03 NOTE — NURSING NOTE
"0110 Patient has caretaker at bedside and this author had spoken to caregiver multiple times about keeping curtain open for staff to see patient. This author noted curtain closed again at 0105 and heard tele alarm sound for 8 run of vtach when this author entered patient room found amio pump to be turned off. This author immediately restarted amio drip per order and verified pump was in fact plugged in and could not find any error r/t pump function. I educated caregiver again to keep curtain open and that the iv pump needs to remain on and pumping medication for patient cardiac rate and rhythm. Caregiver stated \"ok\" and the stated \"I did not turn it off\". Pump monitored closely and this author sat outside patient room for 1 hour and no further episodes noted.   "

## 2025-02-03 NOTE — NURSING NOTE
0764 Assumed nursing care for patient. Patient on 4 liters NC. Care taker at bedside.    1200 NP Ambreen Morgan notified of patient low BP and HR.    1308 Medication changes made due to low BP and HR By CLAUDIA Morgan    5043 Report given to Nidhi SOLIZ

## 2025-02-03 NOTE — DISCHARGE INSTR - OTHER ORDERS
Thank you for choosing [facility] for your Health Care needs.  Also, thank you for allowing us to take you and your families preferences into account when determining your discharge plan.  Stay well!     You may receive a survey in the mail within the next couple weeks. Please take the time to complete it and return it. Your input is ALWAYS important to us. Thank you!  Your Care Transition Team - Katherine Forrester  & Tanja      For questions about your medications listed on your discharge instructions, please call the Nurses Station at [phone].      The Home Care Agency you selected will contact you within 72 hours to schedule a Home Care Visit. If you have any questions prior to the call, please feel free to contact  Home care contact number [phone] (HOME)

## 2025-02-04 ENCOUNTER — HOME HEALTH ADMISSION (OUTPATIENT)
Dept: HOME HEALTH SERVICES | Facility: HOME HEALTH | Age: 85
End: 2025-02-04
Payer: MEDICARE

## 2025-02-04 ENCOUNTER — PHARMACY VISIT (OUTPATIENT)
Dept: PHARMACY | Facility: CLINIC | Age: 85
End: 2025-02-04
Payer: COMMERCIAL

## 2025-02-04 ENCOUNTER — DOCUMENTATION (OUTPATIENT)
Dept: HOME HEALTH SERVICES | Facility: HOME HEALTH | Age: 85
End: 2025-02-04
Payer: MEDICARE

## 2025-02-04 VITALS
BODY MASS INDEX: 35.97 KG/M2 | OXYGEN SATURATION: 98 % | RESPIRATION RATE: 17 BRPM | HEIGHT: 60 IN | TEMPERATURE: 96.6 F | HEART RATE: 72 BPM | SYSTOLIC BLOOD PRESSURE: 123 MMHG | DIASTOLIC BLOOD PRESSURE: 65 MMHG | WEIGHT: 183.2 LBS

## 2025-02-04 LAB
ANION GAP SERPL CALC-SCNC: 8 MMOL/L (ref 10–20)
BUN SERPL-MCNC: 36 MG/DL (ref 6–23)
CALCIUM SERPL-MCNC: 9 MG/DL (ref 8.6–10.3)
CHLORIDE SERPL-SCNC: 96 MMOL/L (ref 98–107)
CO2 SERPL-SCNC: 37 MMOL/L (ref 21–32)
CREAT SERPL-MCNC: 1.05 MG/DL (ref 0.5–1.3)
EGFRCR SERPLBLD CKD-EPI 2021: 70 ML/MIN/1.73M*2
ERYTHROCYTE [DISTWIDTH] IN BLOOD BY AUTOMATED COUNT: 15.8 % (ref 11.5–14.5)
GLUCOSE SERPL-MCNC: 155 MG/DL (ref 74–99)
HCT VFR BLD AUTO: 44.6 % (ref 41–52)
HGB BLD-MCNC: 13.4 G/DL (ref 13.5–17.5)
HOLD SPECIMEN: NORMAL
MCH RBC QN AUTO: 27.9 PG (ref 26–34)
MCHC RBC AUTO-ENTMCNC: 30 G/DL (ref 32–36)
MCV RBC AUTO: 93 FL (ref 80–100)
NRBC BLD-RTO: 0 /100 WBCS (ref 0–0)
PLATELET # BLD AUTO: 170 X10*3/UL (ref 150–450)
POTASSIUM SERPL-SCNC: 4.2 MMOL/L (ref 3.5–5.3)
RBC # BLD AUTO: 4.81 X10*6/UL (ref 4.5–5.9)
SODIUM SERPL-SCNC: 137 MMOL/L (ref 136–145)
WBC # BLD AUTO: 11 X10*3/UL (ref 4.4–11.3)

## 2025-02-04 PROCEDURE — 2500000001 HC RX 250 WO HCPCS SELF ADMINISTERED DRUGS (ALT 637 FOR MEDICARE OP): Mod: IPSPLIT | Performed by: INTERNAL MEDICINE

## 2025-02-04 PROCEDURE — 94760 N-INVAS EAR/PLS OXIMETRY 1: CPT | Mod: IPSPLIT

## 2025-02-04 PROCEDURE — 80048 BASIC METABOLIC PNL TOTAL CA: CPT | Mod: IPSPLIT | Performed by: NURSE PRACTITIONER

## 2025-02-04 PROCEDURE — 2500000004 HC RX 250 GENERAL PHARMACY W/ HCPCS (ALT 636 FOR OP/ED): Mod: IPSPLIT

## 2025-02-04 PROCEDURE — 2500000001 HC RX 250 WO HCPCS SELF ADMINISTERED DRUGS (ALT 637 FOR MEDICARE OP): Mod: IPSPLIT | Performed by: NURSE PRACTITIONER

## 2025-02-04 PROCEDURE — 97166 OT EVAL MOD COMPLEX 45 MIN: CPT | Mod: GO,IPSPLIT

## 2025-02-04 PROCEDURE — 2500000002 HC RX 250 W HCPCS SELF ADMINISTERED DRUGS (ALT 637 FOR MEDICARE OP, ALT 636 FOR OP/ED): Mod: IPSPLIT

## 2025-02-04 PROCEDURE — 2500000001 HC RX 250 WO HCPCS SELF ADMINISTERED DRUGS (ALT 637 FOR MEDICARE OP): Mod: IPSPLIT

## 2025-02-04 PROCEDURE — 36415 COLL VENOUS BLD VENIPUNCTURE: CPT | Mod: IPSPLIT | Performed by: NURSE PRACTITIONER

## 2025-02-04 PROCEDURE — 2500000005 HC RX 250 GENERAL PHARMACY W/O HCPCS: Mod: IPSPLIT | Performed by: NURSE PRACTITIONER

## 2025-02-04 PROCEDURE — 97161 PT EVAL LOW COMPLEX 20 MIN: CPT | Mod: GP,IPSPLIT | Performed by: PHYSICAL THERAPIST

## 2025-02-04 PROCEDURE — 97535 SELF CARE MNGMENT TRAINING: CPT | Mod: GO,IPSPLIT

## 2025-02-04 PROCEDURE — 85027 COMPLETE CBC AUTOMATED: CPT | Mod: IPSPLIT | Performed by: NURSE PRACTITIONER

## 2025-02-04 PROCEDURE — 2500000002 HC RX 250 W HCPCS SELF ADMINISTERED DRUGS (ALT 637 FOR MEDICARE OP, ALT 636 FOR OP/ED): Mod: IPSPLIT | Performed by: NURSE PRACTITIONER

## 2025-02-04 PROCEDURE — 99239 HOSP IP/OBS DSCHRG MGMT >30: CPT | Performed by: NURSE PRACTITIONER

## 2025-02-04 PROCEDURE — RXMED WILLOW AMBULATORY MEDICATION CHARGE

## 2025-02-04 PROCEDURE — 94640 AIRWAY INHALATION TREATMENT: CPT | Mod: IPSPLIT

## 2025-02-04 PROCEDURE — 2500000004 HC RX 250 GENERAL PHARMACY W/ HCPCS (ALT 636 FOR OP/ED): Mod: IPSPLIT | Performed by: NURSE PRACTITIONER

## 2025-02-04 RX ORDER — CEFUROXIME AXETIL 500 MG/1
500 TABLET ORAL 2 TIMES DAILY
Qty: 7 TABLET | Refills: 0 | Status: SHIPPED | OUTPATIENT
Start: 2025-02-04 | End: 2025-02-08

## 2025-02-04 RX ORDER — DOXYCYCLINE 100 MG/1
100 CAPSULE ORAL EVERY 12 HOURS SCHEDULED
Qty: 8 CAPSULE | Refills: 0 | Status: SHIPPED | OUTPATIENT
Start: 2025-02-04 | End: 2025-02-08

## 2025-02-04 RX ORDER — AMIODARONE HYDROCHLORIDE 200 MG/1
200 TABLET ORAL DAILY
Qty: 30 TABLET | Refills: 1 | Status: SHIPPED | OUTPATIENT
Start: 2025-02-05 | End: 2025-04-06

## 2025-02-04 RX ORDER — IPRATROPIUM BROMIDE AND ALBUTEROL SULFATE 2.5; .5 MG/3ML; MG/3ML
3 SOLUTION RESPIRATORY (INHALATION) 4 TIMES DAILY PRN
Qty: 84 ML | Refills: 0 | Status: SHIPPED | OUTPATIENT
Start: 2025-02-04 | End: 2025-02-11

## 2025-02-04 RX ORDER — PREDNISONE 5 MG/1
TABLET ORAL
Qty: 20 TABLET | Refills: 0 | Status: SHIPPED | OUTPATIENT
Start: 2025-02-04 | End: 2025-02-14

## 2025-02-04 RX ADMIN — PANTOPRAZOLE SODIUM 40 MG: 40 TABLET, DELAYED RELEASE ORAL at 05:34

## 2025-02-04 RX ADMIN — AMIODARONE HYDROCHLORIDE 200 MG: 200 TABLET ORAL at 08:12

## 2025-02-04 RX ADMIN — APIXABAN 5 MG: 5 TABLET, FILM COATED ORAL at 08:12

## 2025-02-04 RX ADMIN — ATORVASTATIN CALCIUM 20 MG: 10 TABLET, FILM COATED ORAL at 08:12

## 2025-02-04 RX ADMIN — POLYETHYLENE GLYCOL 3350 17 G: 17 POWDER, FOR SOLUTION ORAL at 08:15

## 2025-02-04 RX ADMIN — PREDNISONE 20 MG: 20 TABLET ORAL at 11:17

## 2025-02-04 RX ADMIN — ASPIRIN 81 MG CHEWABLE TABLET 81 MG: 81 TABLET CHEWABLE at 08:12

## 2025-02-04 RX ADMIN — DOXYCYCLINE HYCLATE 100 MG: 100 TABLET, COATED ORAL at 08:12

## 2025-02-04 RX ADMIN — IPRATROPIUM BROMIDE AND ALBUTEROL SULFATE 3 ML: .5; 3 SOLUTION RESPIRATORY (INHALATION) at 10:21

## 2025-02-04 RX ADMIN — Medication 4 L/MIN: at 10:21

## 2025-02-04 RX ADMIN — CEFUROXIME AXETIL 500 MG: 250 TABLET ORAL at 08:12

## 2025-02-04 RX ADMIN — OSELTAMIVIR PHOSPHATE 75 MG: 75 CAPSULE ORAL at 08:16

## 2025-02-04 RX ADMIN — DIVALPROEX SODIUM 125 MG: 125 TABLET, DELAYED RELEASE ORAL at 08:12

## 2025-02-04 RX ADMIN — MEMANTINE 10 MG: 5 TABLET ORAL at 08:15

## 2025-02-04 ASSESSMENT — COGNITIVE AND FUNCTIONAL STATUS - GENERAL
CLIMB 3 TO 5 STEPS WITH RAILING: TOTAL
WALKING IN HOSPITAL ROOM: A LOT
HELP NEEDED FOR BATHING: A LOT
MOVING FROM LYING ON BACK TO SITTING ON SIDE OF FLAT BED WITH BEDRAILS: A LOT
PERSONAL GROOMING: A LITTLE
MOVING TO AND FROM BED TO CHAIR: A LOT
TURNING FROM BACK TO SIDE WHILE IN FLAT BAD: A LOT
STANDING UP FROM CHAIR USING ARMS: A LOT
MOBILITY SCORE: 11
TOILETING: A LOT
DRESSING REGULAR LOWER BODY CLOTHING: A LOT
DAILY ACTIVITIY SCORE: 14
DRESSING REGULAR UPPER BODY CLOTHING: A LOT
EATING MEALS: A LITTLE

## 2025-02-04 ASSESSMENT — ACTIVITIES OF DAILY LIVING (ADL)
BATHING_ASSISTANCE: MAXIMAL
ADL_ASSISTANCE: NEEDS ASSISTANCE
HOME_MANAGEMENT_TIME_ENTRY: 27
BATHING_LEVEL_OF_ASSISTANCE: MAXIMUM ASSISTANCE
ADL_ASSISTANCE: NEEDS ASSISTANCE
GROOMING_ASSISTANCE: STAND BY

## 2025-02-04 ASSESSMENT — PAIN - FUNCTIONAL ASSESSMENT
PAIN_FUNCTIONAL_ASSESSMENT: 0-10

## 2025-02-04 ASSESSMENT — PAIN SCALES - GENERAL
PAINLEVEL_OUTOF10: 0 - NO PAIN

## 2025-02-04 NOTE — NURSING NOTE
0730 Assumed nursing care for patient. Patient on 4 liters NC, resting in bed. Caregiver at bedside.     0830 Patient up to chair with OT

## 2025-02-04 NOTE — PROGRESS NOTES
Physical Therapy    Physical Therapy Evaluation    Patient Name: Blu Grigsby  MRN: [id number]  Department: GEN CR NON  Room: 01/01-A  Today's Date: 2/4/2025   Time Calculation  Start Time: 1000  Stop Time: 1018  Time Calculation (min): 18 min    Assessment/Plan   PT Assessment  PT Assessment Results: Decreased strength, Impaired balance, Decreased mobility, Decreased cognition, Impaired judgement, Decreased safety awareness  Rehab Prognosis: Good  Barriers to Discharge Home: No anticipated barriers  Evaluation/Treatment Tolerance: Patient tolerated treatment well  Medical Staff Made Aware: Yes  Strengths: Support of Caregivers  Barriers to Participation:  (n/a)  End of Session Communication: Bedside nurse  Assessment Comment: Pleasantly confused 84 y.o presents with weakness and impaired mobility. Pt. has 24hr care and can amb. with SC at SUP. Pt. currently requires modA for transfers and min/modA for amb. and would benefit from additional PT to address above noted limitations and prevent further decline.  End of Session Patient Position: Up in chair, Alarm on  IP OR SWING BED PT PLAN  Inpatient or Swing Bed: Inpatient  PT Plan  Treatment/Interventions: Transfer training, Bed mobility, Gait training, Stair training, Balance training, Strengthening, Endurance training, Therapeutic exercise, Therapeutic activity, Home exercise program  PT Plan: Ongoing PT  PT Frequency: 3 times per week  PT Discharge Recommendations: Moderate intensity level of continued care  Equipment Recommended upon Discharge: Bedside commode  PT Recommended Transfer Status: Assist x1  PT - OK to Discharge: Yes Based on completed evaluation and care plan recommendations, no barriers to discharge to next site of care      Subjective   General Visit Information:  General  Reason for Referral: impaired mobility, influenza A  Referred By: MATHEUS Simmons-CNP  Past Medical History Relevant to Rehab: Abdominal aortic aneurysm (AAA)  (CMS-HCC), CAD (coronary artery disease), Cerebral ventriculomegaly, CHF (congestive heart failure), Chronic systolic heart failure, Dementia, Dementia, HLD (hyperlipidemia), HTN (hypertension), Hypercholesterolemia, Insomnia, Ischemic dilated cardiomyopathy (Multi), LBBB (left bundle branch block), Sleep apnea, and Vitamin D deficiency.  Family/Caregiver Present: Yes  Prior to Session Communication: Bedside nurse  Patient Position Received: Up in chair, Alarm on  General Comment: laura richmond purewick, 02  Home Living:  Home Living  Type of Home: House  Lives With:  (has 24 hr care)  Home Adaptive Equipment: Walker rolling or standard, Cane  Home Layout: One level  Home Access: Stairs to enter without rails (1 + 1 (holds onto door frame))  Bathroom Shower/Tub: Tub/shower unit  Bathroom Equipment: Grab bars in shower (shower chair)  Prior Level of Function:  Prior Function Per Pt/Caregiver Report  Level of [place]: Needs assistance with homemaking, Needs assistance with ADLs, Needs assistance with functional transfers (per caregiver: level of assist for transfers fluctuates depending on the need)  Receives Help From:  (caregiver)  ADL Assistance: Needs assistance  Homemaking Assistance: Needs assistance  Ambulatory Assistance:  (at times pt. only needs supervision with SC)  Precautions:  Precautions  Medical Precautions: Fall precautions, Oxygen therapy device and L/min, Infection precautions      Date/Time Vitals Session Patient Position Pulse Resp SpO2 BP MAP (mmHg)    02/04/25 1000 --  --  74  --  96 %  125/67  --     02/04/25 1021 --  --  --  --  97 %  --  --                 Objective   Pain:  Pain Assessment  Pain Assessment: 0-10  0-10 (Numeric) Pain Score: 0 - No pain  Cognition:  Cognition  Overall Cognitive Status: Impaired at baseline, Impaired  Arousal/Alertness: Appropriate responses to stimuli  Orientation Level: Disoriented to time, Disoriented to situation, Disoriented to place  Following  Commands: Follows one step commands with increased time    General Assessments:     Activity Tolerance  Endurance: Decreased tolerance for upright activites (+ weakness)       Strength  Strength Comments: BLE: grossly 4-/5     Coordination  Movements are Fluid and Coordinated: Yes       Static Standing Balance  Static Standing-Comment/Number of Minutes: F; with BUE support and Robert (+retrolean)  Dynamic Standing Balance  Dynamic Standing-Comments: F-; with BUE support and modA when amb. backwards  Functional Assessments:       Transfers  Transfer: Yes  Transfer 1  Technique 1: Sit to stand, Stand to sit  Transfer Device 1: Walker  Transfer Level of Assistance 1: Moderate assistance    Ambulation/Gait Training  Ambulation/Gait Training Performed: Yes  Ambulation/Gait Training 1  Surface 1: Level tile  Device 1: Rolling walker  Assistance 1: Moderate assistance, Minimum assistance  Quality of Gait 1: Decreased step length, Forward flexed posture, Soft knee(s) (+retropulsive (exp. when amb. backwards))  Comments/Distance (ft) 1: 5'  Extremity/Trunk Assessments:  RLE   RLE : Within Functional Limits  LLE   LLE : Within Functional Limits  Outcome Measures:  [facility] Basic Mobility  Turning from your back to your side while in a flat bed without using bedrails: A lot  Moving from lying on your back to sitting on the side of a flat bed without using bedrails: A lot  Moving to and from bed to chair (including a wheelchair): A lot  Standing up from a chair using your arms (e.g. wheelchair or bedside chair): A lot  To walk in hospital room: A lot  Climbing 3-5 steps with railing: Total  Basic Mobility - Total Score: 11    Encounter Problems       Encounter Problems (Active)       Balance       STG - Maintains dynamic standing balance with upper extremity support with >=fair+ balance        Start:  02/04/25    Expected End:  02/18/25               Mobility       LTG - Patient will ambulate household distance with CGA and WW        Start:  02/04/25    Expected End:  02/18/25            STG - Patient will ambulate up and down a curb/step with CGA        Start:  02/04/25    Expected End:  02/18/25               PT Transfers       STG - Patient will perform bed mobility with SBA        Start:  02/04/25    Expected End:  02/18/25            STG - Patient will transfer sit to and from stand with CGA and WW       Start:  02/04/25    Expected End:  02/18/25               Pain - Adult              Education Documentation  Mobility Training, taught by Kimberly Conley, PT at 2/4/2025 11:18 AM.  Learner: Patient  Readiness: Acceptance  Method: Explanation  Response: Verbalizes Understanding  Comment: Educated pt. on PT POC    Education Comments  No comments found.

## 2025-02-04 NOTE — PROGRESS NOTES
02/04/25 1052   Discharge Planning   Living Arrangements Alone   Support Systems Family members;Other (Comment)   Assistance Needed PT/OT recommend MOD intensity-family wants patient to return home.  Discussed therapy in the home with daughter at bedside.  Patient has had [facility] in the past.  Notifying [facility].   Type of Residence Private residence   Home or Post Acute Services In home services   Type of Home Care Services Home PT;Home OT;DME or oxygen   Expected Discharge Disposition Home H     Notified [facility] Carmen CHARLES Via secure chat that patient will be discharged and requesting [facility]-PT/OT.      IMM letter given and explained to patient's daughter Je    1:30 pm  [facility] confirmed acceptance w/SOC 24-48 hours.      DC PLAN:  Home with [facility]-PT/OT and resume private caretaker

## 2025-02-04 NOTE — PROGRESS NOTES
Occupational Therapy    Evaluation/Treatment    Patient Name: Blu Grigsby  MRN: [id number]  Department: GEN CR NONV1  Room: 01/01-A  Today's Date: 02/04/25  Time Calculation  Start Time: 0803  Stop Time: 0850  Time Calculation (min): 47 min       Assessment:  OT Assessment: The pt. displays decondtioning with a decline from his prior level of functioning for self care skills & functional transfers due to his hospitalization for pneumonia, influenza, & acute hypoxic respiratory failure. OT intervention is indicated to address the pt.'s deficit areas with balance, self care skills & functional transfers to restore the pt. towards his previous level of functioning.  Prognosis: Good  Barriers to Discharge Home: No anticipated barriers  Evaluation/Treatment Tolerance: Patient tolerated treatment well  Medical Staff Made Aware: Yes  End of Session Communication: Bedside nurse  End of Session Patient Position: Up in chair, Alarm on. Pt.'s caregiver was also present at this time.  OT Assessment Results: Decreased ADL status, Decreased upper extremity strength, Decreased safe judgment during ADL, Decreased cognition, Decreased trunk control for functional activities  Prognosis: Good  Barriers to Discharge: None  Evaluation/Treatment Tolerance: Patient tolerated treatment well  Medical Staff Made Aware: Yes  Strengths: Living arrangement secure, Premorbid level of function, Support of Caregivers, Support of extended family/friends  Barriers to Participation: Comorbidities  Plan:  Treatment Interventions: ADL retraining, Functional transfer training, UE strengthening/ROM, Cognitive reorientation, Patient/family training, Equipment evaluation/education, Neuromuscular reeducation, Compensatory technique education  OT Frequency: 3 times per week  OT Discharge Recommendations: Moderate intensity level of continued care  OT Recommended Transfer Status: Moderate assist  OT - OK to Discharge: Yes Based on completed evaluation and  care plan recommendations, no barriers to discharge to next site of care    Treatment Interventions: ADL retraining, Functional transfer training, UE strengthening/ROM, Cognitive reorientation, Patient/family training, Equipment evaluation/education, Neuromuscular reeducation, Compensatory technique education    Subjective   Current Problem:  1. Influenza A        2. Elevated troponin        3. Pneumonia and influenza  cefuroxime (Ceftin) 500 mg tablet    doxycycline (Vibramycin) 100 mg capsule    oxygen (O2) gas therapy    predniSONE (Deltasone) 5 mg tablet    Oxygen therapy for home      4. Hypoxemia  oxygen (O2) gas therapy    predniSONE (Deltasone) 5 mg tablet    Oxygen therapy for home      5. Heart failure with mid-range ejection fraction (HFmEF)  Transthoracic Echo (TTE) Limited    Transthoracic Echo (TTE) Limited      6. Atrial fibrillation with rapid ventricular response (Multi)  Transthoracic Echo (TTE) Limited    Transthoracic Echo (TTE) Limited    amiodarone (Pacerone) 200 mg tablet    apixaban (Eliquis) 5 mg tablet        General:   OT Received On: 02/04/25  General  Reason for Referral: Imapaired self care skills & functional transfers due to hospitalization for influenza, pneumonia, & acute hypoxic respiratory failure  Referred By: LISSETT Simmons  Family/Caregiver Present:  (Pt.'s caregiver was present at this time.)  Caregiver Feedback: Pt.'s caregiver provided information on the pt.'s home set up & his prior level of functioning.  Prior to Session Communication: Bedside nurse  Patient Position Received: Bed, 2 rail up, Alarm on  Preferred Learning Style: verbal, visual  General Comment: telemetry, O2 4l   Precautions:  Medical Precautions: Fall precautions  Precautions Comment: safety precautions, islolation precautions, droplet precautions    Vital signs: Pre session: BP was 148/86, HR was 74 BPM & his O2 saturation level was 96%    Pain:  Pain Assessment  Pain Assessment: 0-10  0-10  (Numeric) Pain Score: 0 - No pain    Objective   Cognition:  Overall Cognitive Status: Impaired at baseline  Arousal/Alertness: Appropriate responses to stimuli  Orientation Level: Oriented only to self at this time  Following Commands: WFL for 1 level commands  Safety Judgment: Max cues for safety & sequencing  Memory: Impaired.  Pt. has dementia       Home Living:  Type of Home: House  Lives With: Alone with 24 hour caregiver  Home Adaptive Equipment: standard wheelchair, cane, FWW  Home Layout: One level  Home Access: Stairs to enter without rails  Entrance Stairs-Rails: None  Entrance Stairs-Number of Steps: 1+1  Bathroom Shower/Tub: Walk-in shower  Bathroom Equipment: shower chair, grab bars in the shower  Home Living Comments: Pt. has caregiversor family with him 24/7 at home.  Prior Function:  Level of [place]: Supervision ambulation & transfers with a cane, however at times uses a FWW for ambulation.  S toileting.  Close SBA & cues to Robert for bathing & dressing.  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  Leisure: Pt. watches tv & likes to play cards.  Hand Dominance: Right  IADL History:  Current License: No  ADL:  Eating Assistance:  Close SBAwith set up & intermittent cues for task engagement  Grooming Assistance: Minimal & cues with set up from a sitting position  Bathing Assistance: Maximal  UE Dressing Assistance: Moderate & cues  LE Dressing Assistance: Maximal  Toileting Assistance with Device: Maximal  Activities of Daily Living:     Grooming  Grooming Level of Assistance: Minimum assistance & cues with set up from a sitting position.  Pt. washed his face, combed his hair & performed oral hygiene care with Close SBA & cues after extensive set up was provided. Anticipate Robert needed for shaving.  Grooming Where Assessed: Chair    UE Bathing  UE Bathing Level of Assistance: Contact guard. Tactile cues & vc's needed for task initiation.  UE Bathing Where Assessed: Bedside chair    LE Bathing  LE Bathing Level of Assistance: Maximum assistance  LE Bathing Where Assessed:  Pt. required Robert & cues to bathe his distal LE's.  ModA & cues for standing balance while pt. performed front perineal hygiene care.  Pt. required assistance for back perineal hygiene care.    UE Dressing  UE Dressing Level of Assistance: ModA & cues to assist with donning a hospital gown  UE Dressing Where Assessed: Chair    LE Dressing  LE Dressing: Yes  Sock Level of Assistance:  Pt. required Robert & cues to don his R slipper sock.  Close SBA & cues to don his L slipper sock.  Adult Briefs Level of Assistance:  Robert & cues to begin a pull up brief over his distal LE's.  ModA & cues for standing balance while donning the pull up brief as well as Robert & cues provided for task completion(Donning the brief in the back)  LE Dressing Where Assessed: Chair    Bed Mobility/Transfers: Bed Mobility  Bed Mobility: Yes  Bed Mobility 1  Bed Mobility 1: Supine to sitting  Level of Assistance 1: Moderate assistance & cues with bed rail use    Transfers  Transfer: Yes  Transfer 1  Technique 1: Sit to stand, Stand to sit  Transfer Device 1: Gait belt, Walker  Transfer Level of Assistance 1: Moderate assistance & cues  Trials/Comments 1: Sit to stand performed several trials  Transfers 2  Technique 2: Stand pivot  Transfer Device 2: Gait belt, Walker  Transfer Level of Assistance 2: Moderate assistance & cues)  Trials/Comments 2: Max vc's for safety & sequencing.    Sitting Balance:  Static  Sitting Balance  Static Sitting-Level of Assistance: Close SBA on the side of the bed  Standing Balance:  Static Standing Balance  Static Standing-Level of Assistance: Static standing balance:Robert & cues with a FWW & B UE support.     Vision:Vision - Basic Assessment  Current Vision: Wears glasses only for reading    Strength:  Strength Comments: B UE strength is grossly G- throughout    Hand Function:  Hand Function  Gross Grasp: Functional  Coordination: Functional  Extremities: RUE   RUE : Within Functional Limits and LUE   LUE: Within Functional Limits      Outcome Measures: [facility] Daily Activity  Putting on and taking off regular lower body clothing: A lot  Bathing (including washing, rinsing, drying): A lot  Putting on and taking off regular upper body clothing: A lot  Toileting, which includes using toilet, bedpan or urinal: A lot  Taking care of personal grooming such as brushing teeth: A little  Eating Meals: A little  Daily Activity - Total Score: 14      Education Documentation  Precautions, taught by Martha Payne OT at 2/4/2025 11:22 AM.  Learner: Caregiver, Patient  Readiness: Acceptance  Method: Explanation  Response: Verbalizes Understanding  Comment: OT POC.  Education on safety with transfers & safety with balance.    ADL Training, taught by Martha Payne OT at 2/4/2025 11:22 AM.  Learner: Caregiver, Patient  Readiness: Acceptance  Method: Explanation  Response: Verbalizes Understanding  Comment: OT POC.  Education on safety with transfers & safety with balance.      Goals:  Encounter Problems       Encounter Problems (Active)       ADLs       Patient with complete upper body dressing with Close SBA & cues to don a pull over shirt.       Start:  02/04/25    Expected End:  02/18/25            Patient with complete lower body dressing with Robert & cues        Start:  02/04/25    Expected End:  02/18/25            Patient will complete daily grooming tasks Close S set up & cues       Start:   02/04/25    Expected End:  02/18/25            Patient will complete toileting including hygiene clothing management/hygiene with Robert & cues with A/safety devices       Start:  02/04/25    Expected End:  02/18/25            Robert bathing with A/safety devices       Start:  02/04/25    Expected End:  02/18/25               BALANCE       Close SBA & cues for standing balance as needed for self care performance with A device.       Start:  02/04/25    Expected End:  02/18/25               MOBILITY       Close S & cues for bed mobility as needed to engage in out of bed activity & ADL's.       Start:  02/04/25    Expected End:  02/18/25               TRANSFERS       Close SBA transfers to the bed, chair & toilet with A/safety devices       Start:  02/04/25    Expected End:  02/18/25

## 2025-02-04 NOTE — CARE PLAN
The patient's goals for the shift include      The clinical goals for the shift include Patient pulse ox will be maintianed at 90% or greater on 2 liters NC this shift 2/4/25 1900.    Patient pulse ox at 90% or greater on 4 liters NC. Staff was unable to titrate O2 down to two liters. Family and caregiver at bedside. Discharge instructions reviewed with daughter. Bilateral IV removed. Patient sent home with O2 tank. Patient is a two person assist.

## 2025-02-04 NOTE — CARE PLAN
The patient's goals for the shift include      The clinical goals for the shift include Patient pulse ox will be maintained at 90% or greater on 2 liters NC this shift 2/3/25 1900.    2100 Pt has caregiver at bedside, pt aox1, med's given in apple sauce and tolerated well, Pt is calm and resting, all comfort and safety maintained

## 2025-02-04 NOTE — HH CARE COORDINATION
Home Care received a Referral for Physical Therapy and Occupational Therapy. We have processed the referral for a Start of Care on 02/05-02/06.     If you have any questions or concerns, please feel free to contact us at [phone]. Follow the prompts, enter your five digit zip code, and you will be directed to your care team on [facility].

## 2025-02-04 NOTE — CONSULTS
Nutrition Initial Assessment:   Nutrition Assessment    Reason for Assessment: Admission nursing screening    Patient is a 84 y.o. male presenting with influenza A. Consulted by MST for wt loss and decreased appetite. Pt with stable wts (184#s 8/21/24, currently 183#s). PO around 50%, appetite poor due to flu/decreased appetite. Pt leaving today per care transitions team. Rec; ensure sure plus HP 1x daily if does not discharge today.    Nutrition History:  Energy Intake: Fair 50-75 %  Food and Nutrient History: Pt with decreased appetite due to flu. No changes in weight or appetite prior to admission.    Anthropometrics:  Height: 152.4 cm (5')   Weight: 83.1 kg (183 lb 3.2 oz)   BMI (Calculated): 35.78    Weight Change %:  Weight History / % Weight Change: wts stable.    Nutrition Focused Physical Exam Findings:  Subcutaneous Fat Loss:   Orbital Fat Pads: Mild-Moderate (slight dark circles and slight hollowing)  Buccal Fat Pads: Well nourished (full, rounded cheeks)  Muscle Wasting:  Temporalis: Well nourished (well-defined muscle)  Pectoralis (Clavicular Region): Well nourished (clavicle not visible)  Edema:  Edema: none  Physical Findings:  Hair: Negative  Eyes: Negative  Nails: Negative    Nutrition Significant Labs:  CBC Trend:   Results from last 7 days   Lab Units 02/04/25 0526 02/03/25 0527 02/02/25 0349 02/01/25  0422   WBC AUTO x10*3/uL 11.0 6.4 8.6 11.1   RBC AUTO x10*6/uL 4.81 4.64 4.75 5.27   HEMOGLOBIN g/dL 13.4* 13.0* 13.5 14.9   HEMATOCRIT % 44.6 43.0 43.9 48.0   MCV fL 93 93 92 91   PLATELETS AUTO x10*3/uL 170 149* 140* 152    , BMP Trend:   Results from last 7 days   Lab Units 02/04/25 0526 02/03/25 0527 02/02/25 0349 02/01/25  0422   GLUCOSE mg/dL 155* 160* 115* 131*   CALCIUM mg/dL 9.0 8.2* 8.3* 9.0   SODIUM mmol/L 137 138 135* 138   POTASSIUM mmol/L 4.2 4.1 3.9 4.0   CO2 mmol/L 37* 33* 31 31   CHLORIDE mmol/L 96* 99 98 98   BUN mg/dL 36* 27* 20 18   CREATININE mg/dL 1.05 1.03 0.85 1.03       Nutrition Specific Medications:  amiodarone, 200 mg, oral, Daily  apixaban, 5 mg, oral, BID  aspirin, 81 mg, oral, Daily  atorvastatin, 20 mg, oral, Daily  [Held by provider] carvedilol, 3.125 mg, oral, BID  cefuroxime, 500 mg, oral, BID  divalproex, 125 mg, oral, Daily  donepezil, 10 mg, oral, q PM  doxycylcine, 100 mg, oral, q12h ANDREY  [Held by provider] furosemide, 40 mg, oral, BID  ipratropium-albuteroL, 3 mL, nebulization, TID  [Held by provider] lisinopril, 20 mg, oral, BID  memantine, 10 mg, oral, BID  mirtazapine, 15 mg, oral, Nightly  oseltamivir, 75 mg, oral, BID  pantoprazole, 40 mg, oral, Daily before breakfast  polyethylene glycol, 17 g, oral, Daily  predniSONE, 20 mg, oral, Daily   Followed by  [START ON 2/6/2025] predniSONE, 15 mg, oral, Daily   Followed by  [START ON 2/8/2025] predniSONE, 10 mg, oral, Daily   Followed by  [START ON 2/10/2025] predniSONE, 5 mg, oral, Daily         Dietary Orders (From admission, onward)       Start     Ordered    02/01/25 1750  Adult diet 2-3 grams sodium; 1800 mL fluid  Diet effective now        Question Answer Comment   Diet type 2-3 grams sodium    Dietary fluid restriction / 24h: 1800 mL fluid        02/01/25 1749    02/01/25 1721  May Participate in Room Service  ( ROOM SERVICE MAY PARTICIPATE)  Once        Question:  .  Answer:  Yes    02/01/25 1720                     Estimated Needs:   Total Energy Estimated Needs in 24 hours (kCal): 2075 kCal  Method for Estimating Needs: 25 kcals/kg BW  Total Protein Estimated Needs in 24 Hours (g): 83 g  Method for Estimating 24 Hour Protein Needs: 1g/kg BW  Total Fluid Estimated Needs in 24 Hours (mL): 1800 mL  Method for Estimating 24 Hour Fluid Needs: 1800 ml fluid restriction        Nutrition Diagnosis        Nutrition Diagnosis  Patient has Nutrition Diagnosis: Yes  Diagnosis Status (1): New  Nutrition Diagnosis 1: Inadequate oral intake  Related to (1): flu  As Evidenced by (1): decreased appetite, below 75% of  nutrient needs       Nutrition Interventions/Recommendations   Nutrition prescription for oral nutrition    Nutrition Recommendations:  Individualized Nutrition Prescription Provided for : Individualized nutrition prescription of 2075 kcals and 83g of protein to be provided with diet order. Continue with 1800 ml fluid restriction and 2-3g Na diet.    Nutrition Interventions/Goals:   Interventions: Meals and snacks  Meals and Snacks: Mineral-modified diet, Fluid-modified diet  Goal: Consume 3 meals daily.      Education Documentation  No documentation found.       Nutrition Monitoring and Evaluation   Food/Nutrient Related History Monitoring  Monitoring and Evaluation Plan: Intake / amount of food, Estimated Energy Intake  Estimated Energy Intake: Energy intake 50 -75% of estimated energy needs  Intake / Amount of food: Consumes at least 50% or more of meals/snacks/supplements    Anthropometric Measurements  Monitoring and Evaluation Plan: Body weight  Body Weight: Body weight - Maintain stable weight    Biochemical Data, Medical Tests and Procedures  Monitoring and Evaluation Plan: Electrolyte/renal panel  Electrolyte and Renal Panel: Electrolytes within normal limits    Time Spent (min): 60 minutes

## 2025-02-05 LAB
BACTERIA BLD CULT: NORMAL
BACTERIA BLD CULT: NORMAL

## 2025-02-06 ENCOUNTER — TELEPHONE (OUTPATIENT)
Dept: PRIMARY CARE | Facility: CLINIC | Age: 85
End: 2025-02-06
Payer: MEDICARE

## 2025-02-06 ENCOUNTER — HOME CARE VISIT (OUTPATIENT)
Dept: HOME HEALTH SERVICES | Facility: HOME HEALTH | Age: 85
End: 2025-02-06
Payer: MEDICARE

## 2025-02-06 VITALS
HEART RATE: 72 BPM | SYSTOLIC BLOOD PRESSURE: 138 MMHG | DIASTOLIC BLOOD PRESSURE: 80 MMHG | RESPIRATION RATE: 20 BRPM | OXYGEN SATURATION: 93 %

## 2025-02-06 PROCEDURE — G0151 HHCP-SERV OF PT,EA 15 MIN: HCPCS | Mod: HHH

## 2025-02-06 PROCEDURE — 169592 NO-PAY CLAIM PROCEDURE

## 2025-02-06 SDOH — HEALTH STABILITY: MENTAL HEALTH: SMOKING IN HOME: 0

## 2025-02-06 SDOH — ECONOMIC STABILITY: HOUSING INSECURITY: EVIDENCE OF SMOKING MATERIAL: 0

## 2025-02-06 ASSESSMENT — ACTIVITIES OF DAILY LIVING (ADL)
ENTERING_EXITING_HOME: MODERATE ASSIST
AMBULATION_DISTANCE/DURATION_TOLERATED: 15FT
AMBULATION ASSISTANCE ON FLAT SURFACES: 1
OASIS_M1830: 06

## 2025-02-06 ASSESSMENT — ENCOUNTER SYMPTOMS
DENIES PAIN: 1
PERSON REPORTING PAIN: FAMILY

## 2025-02-06 NOTE — TELEPHONE ENCOUNTER
Transition of Care    Inpatient facility: [facility]  Discharge diagnosis: Influenza A  Discharged to: home  Discharge date: 2/4/25  Initial Call date: 2/5/25  Spoke with patient/caregiver: caregiver                                                                     Do you need assistance  visits prior to your PCP visit: No  Home health care ordered: Yes  Have you been contacted by home care and have a start of care date: Yes  Are you taking medications as prescribed at discharge: YES    Referral to [facility] Pharmacist: No  Patient advised to bring all medications to PCP follow-up appointment.  Patient advised to follow discharge instructions until provider follow-up.  TCM visit date: 2/10/25  TCM provider visit with: NICK Dickey MD

## 2025-02-07 ENCOUNTER — PATIENT OUTREACH (OUTPATIENT)
Dept: CARE COORDINATION | Facility: CLINIC | Age: 85
End: 2025-02-07
Payer: MEDICARE

## 2025-02-07 SDOH — ECONOMIC STABILITY: GENERAL: WOULD YOU LIKE HELP WITH ANY OF THE FOLLOWING NEEDS?: I DONT NEED HELP WITH ANY OF THESE

## 2025-02-07 NOTE — PROGRESS NOTES
Outreach call to patient to support a smooth transition of care from recent admission.  Spoke with patient's daughter, she states patient is doing fine, active with [facility], has all discharge medications and DME. Follow up with PCP on 2/10.  Will continue to monitor through transition period.

## 2025-02-08 DIAGNOSIS — F03.90 UNSPECIFIED DEMENTIA, UNSPECIFIED SEVERITY, WITHOUT BEHAVIORAL DISTURBANCE, PSYCHOTIC DISTURBANCE, MOOD DISTURBANCE, AND ANXIETY: ICD-10-CM

## 2025-02-08 DIAGNOSIS — I10 ESSENTIAL (PRIMARY) HYPERTENSION: ICD-10-CM

## 2025-02-08 DIAGNOSIS — F03.C0 SEVERE DEMENTIA, UNSPECIFIED DEMENTIA TYPE, UNSPECIFIED WHETHER BEHAVIORAL, PSYCHOTIC, OR MOOD DISTURBANCE OR ANXIETY: ICD-10-CM

## 2025-02-08 NOTE — DISCHARGE SUMMARY
Discharge Diagnosis  Influenza A    Discharge Meds     Medication List      START taking these medications     amiodarone 200 mg tablet; Commonly known as: Pacerone; Take 1 tablet   (200 mg) by mouth once daily.   cefuroxime 500 mg tablet; Commonly known as: Ceftin; Take 1 tablet (500   mg) by mouth 2 times a day for 7 doses.   doxycycline 100 mg capsule; Commonly known as: Vibramycin; Take 1   capsule (100 mg) by mouth every 12 hours for 8 doses. Take with a full   glass of water and do not lie down for at least 30 minutes after.   Eliquis 5 mg tablet; Generic drug: apixaban; Take 1 tablet (5 mg) by   mouth 2 times a day.   ipratropium-albuteroL 0.5-2.5 mg/3 mL nebulizer solution; Commonly known   as: Duo-Neb; Take 3 mL by nebulization 4 times a day as needed for   wheezing or shortness of breath for up to 7 days.   oxygen gas therapy; Commonly known as: O2; Inhale 1 each continuously.   predniSONE 5 mg tablet; Commonly known as: Deltasone; Take 4 tablets (20   mg) by mouth once daily for 2 days, THEN 3 tablets (15 mg) once daily for   2 days, THEN 2 tablets (10 mg) once daily for 2 days, THEN 1 tablet (5 mg)   once daily for 2 days.; Start taking on: February 4, 2025     CHANGE how you take these medications     lisinopril 20 mg tablet; TAKE 1 TABLET BY MOUTH TWICE A DAY; What   changed: additional instructions     CONTINUE taking these medications     aspirin 81 mg chewable tablet   atorvastatin 20 mg tablet; Commonly known as: Lipitor; Take 1 tablet (20   mg) by mouth once daily.   carvedilol 3.125 mg tablet; Commonly known as: Coreg; Take 1 tablet   (3.125 mg) by mouth 2 times a day.   divalproex 125 mg EC tablet; Commonly known as: Depakote   donepezil 10 mg tablet; Commonly known as: Aricept; TAKE 1 TABLET (10   MG) BY MOUTH ONCE DAILY IN THE EVENING.   furosemide 40 mg tablet; Commonly known as: Lasix; TAKE 1 TABLET (40 MG)   BY MOUTH 2 TIMES DAILY (MORNING AND LATE AFTERNOON).   memantine 10 mg tablet;  "Commonly known as: Namenda; TAKE 1 TABLET BY   MOUTH TWICE A DAY   mirtazapine 15 mg tablet; Commonly known as: Remeron; TAKE 1 TABLET (15   MG) BY MOUTH ONCE DAILY AT BEDTIME.   One Daily Multivitamin tablet; Generic drug: multivitamin   PRESERVISION AREDS ORAL       Test Results Pending At Discharge  Pending Labs       No current pending labs.            Hospital Course   HPI:\"Blu Grigsby is a 84 y.o. male with PMH of heart failure, dementia, HLD, HTN who presented to the ED yesterday with cough, congestion and fatigue x 1 day. He was hypoxic on arrival requiring 2 liters of oxygen. He was unable to give history due to his dementia. His workup showed Flu A with probably bilateral lower lobe pneumonia. His BNP and troponins were elevated as well with imaging showing congestion. He was started on vancomycin and zosyn, tamiflu and bronchodilators and admitted to Medicine for further treatment and evaluation and close monitoring of hemodynamic status.\"    Blu was admitted to Medicine and treated for influenza A, pneumonia, hypoxic respiratory failure. He was monitored in ICU, given IV antibiotics, bronchodilators, IV steroids and tamiflu. He was also treated for tachycardia with IV amiodarone, transitioned to oral amiodarone and he was followed by Cardiology closely. Other chronic medical conditions were also addressed and monitored. PT/OT evals were done and RT, Cardiology consulted during their inpatient stay. Labs were closely monitored. Blu was discharged in stable condition with the above medication changes and/or additions. Recommendations were made to follow up with pt's PCP in 1-2 weeks.   See full inpatient plan below.     Problem List:  Influenza A  Hypoxemia  Community acquired bacterial pneumonia  Sleep apnea  Acute hypoxic respiratory failure (Multi)  - Flu A+  - CXR: Cardiomegaly with mild pulmonary congestion. Probable small bilateral pleural effusion with left linear atelectasis.  - CT c/a/p: " Bilateral lower lobe clusters of inflammatory parenchymal nodules, suggestive of bacterial infection in the appropriate clinical setting. Small right pleural effusion. Cardiomegaly. No findings of an acute process in the abdomen or pelvis. Cholelithiasis. Stent-graft repair of abdominal aortic aneurysm.  The aneurysm shows maximum diameter of 5.2 cm.  - hypoxic on arrival  - IV zosyn, vancomycin started, continue  - transitioned to ceftriaxone, doxycycline, day 2  - procal 3.67  - blood cultures NGTD  - sputum culture if able  - O2 4 liters required, baseline room air/2 liters PRN?  - RT consult,  bronchial hygiene  - wean O2 as able  - pulse ox monitoring  - continue oseltamivir, duonebs, IV steroids  - follow WBC, blood cultures  ---DC home with family on meds listed above  ---oxygen for home use, wean as tolerated  ---Follow up as needed with PCP     Elevated troponin  CAD (coronary artery disease)  Pure hypercholesterolemia  Heart failure with mid-range ejection fraction (HFmEF)  Hypertension, essential  Ischemic dilated cardiomyopathy (Multi)  LBBB (left bundle branch block)  - Echo: 2021: 1. The left ventricular systolic function is low normal with a 45-50% estimated ejection fraction.  2. Spectral Doppler shows an abnormal pattern of left ventricular diastolic filling.  3. There is moderate mitral annular calcification.  4. There is posterior mitral leaflet prolapse with anteriorly directed mitral regurgitation jet.  5. Moderate mitral valve regurgitation.  - EKG: SR with PAC, repeat afib RVR, wide complex  - Troponin 54 > 67 > 48  - BNP 1524  - Mag 1.77, K 4.1  - wide complex tachycardia overnight, moved to ICU  - IV amio gtt started > transitioning to oral amiodarone today  - telemetry  - DNR CCA, DNI  - Cardiology consulted, appreciate recs  - continue asa, atorvastatin, carvedilol, furosemide, lisinopril  - follow BMP  ---Continue all chronic meds  ---Follow up as needed with PCP, Cardiology     Severe  dementia, unspecified dementia type, unspecified whether behavioral, psychotic, or mood disturbance or anxiety  - CTH: 1. No acute intracranial abnormality 2. Advanced cerebral atrophy with extensive chronic microvascular changes of the white matter 3. Suspected remote right occipital lobe and right MCA territory infarcts with asymmetric volume loss in these areas similar to the prior exam  - sitter (private pay) at bedside   - safety precautions  ---continue donepezil, divalproex, memantine, mirtazapine       Pertinent Physical Exam At Time of Discharge  Physical Exam  Constitutional:       General: He is not in acute distress.     Appearance: Normal appearance. He is not toxic-appearing.   HENT:      Head: Normocephalic and atraumatic.      Mouth/Throat:      Mouth: Mucous membranes are moist.   Eyes:      Extraocular Movements: Extraocular movements intact.      Pupils: Pupils are equal, round, and reactive to light.   Cardiovascular:      Rate and Rhythm: Normal rate and regular rhythm.      Pulses: Normal pulses.      Heart sounds: Normal heart sounds. No murmur heard.     No gallop.   Pulmonary:      Effort: Pulmonary effort is normal. No respiratory distress.      Breath sounds: Wheezing and rhonchi present. No rales.   Abdominal:      General: Bowel sounds are normal. There is no distension.      Palpations: Abdomen is soft.      Tenderness: There is no abdominal tenderness. There is no guarding or rebound.   Musculoskeletal:         General: No swelling, tenderness, deformity or signs of injury. Normal range of motion.      Cervical back: Normal range of motion and neck supple.   Skin:     General: Skin is warm and dry.      Capillary Refill: Capillary refill takes less than 2 seconds.      Coloration: Skin is not jaundiced.      Findings: No bruising or rash.   Neurological:      General: No focal deficit present.      Mental Status: He is alert. Mental status is at baseline. He is disoriented.       Cranial Nerves: No cranial nerve deficit.      Sensory: No sensory deficit.   Psychiatric:         Mood and Affect: Mood normal.         Behavior: Behavior normal.      Patient seen by Dr Rueda on day of discharge.  Stable for discharge to home.  Total cumulative time spent in preparation of this discharge including documentation review, coordination of care with the medical team including PT/SW/care coordinators and treating consultants, discussion with patient and pertinent family members and finalization of prescriptions, follow-up appointments, and this discharge summary was approximately 45 minutes.    Outpatient Follow-Up  Future Appointments   Date Time Provider Department Center   2/10/2025 12:30 PM Kim Trevizo RN [facility]   2/10/2025  1:30 PM Toya Dickey MD [id number]   2/12/2025 To Be Determined Lauryn Nina, PTA [facility]   2/12/2025 To Be Determined Saman Frederick, OT [facility]   2/25/2025 To Be Determined Faiza Espitia, PT [facility]         Ambreen Morgan, APRN-CNP

## 2025-02-10 ENCOUNTER — HOME CARE VISIT (OUTPATIENT)
Dept: HOME HEALTH SERVICES | Facility: HOME HEALTH | Age: 85
End: 2025-02-10
Payer: MEDICARE

## 2025-02-10 ENCOUNTER — APPOINTMENT (OUTPATIENT)
Dept: PRIMARY CARE | Facility: CLINIC | Age: 85
End: 2025-02-10
Payer: MEDICARE

## 2025-02-10 VITALS
DIASTOLIC BLOOD PRESSURE: 78 MMHG | WEIGHT: 171.25 LBS | RESPIRATION RATE: 16 BRPM | OXYGEN SATURATION: 96 % | BODY MASS INDEX: 33.44 KG/M2 | HEART RATE: 78 BPM | TEMPERATURE: 97.4 F | SYSTOLIC BLOOD PRESSURE: 130 MMHG

## 2025-02-10 VITALS
HEART RATE: 66 BPM | DIASTOLIC BLOOD PRESSURE: 52 MMHG | SYSTOLIC BLOOD PRESSURE: 88 MMHG | OXYGEN SATURATION: 96 % | TEMPERATURE: 97.5 F

## 2025-02-10 DIAGNOSIS — H35.3221 EXUDATIVE AGE-RELATED MACULAR DEGENERATION, LEFT EYE, WITH ACTIVE CHOROIDAL NEOVASCULARIZATION: ICD-10-CM

## 2025-02-10 DIAGNOSIS — I48.91 ATRIAL FIBRILLATION WITH RAPID VENTRICULAR RESPONSE (MULTI): ICD-10-CM

## 2025-02-10 DIAGNOSIS — F99 INSOMNIA DUE TO OTHER MENTAL DISORDER: ICD-10-CM

## 2025-02-10 DIAGNOSIS — I10 ESSENTIAL (PRIMARY) HYPERTENSION: ICD-10-CM

## 2025-02-10 DIAGNOSIS — J18.9 PNEUMONIA DUE TO INFECTIOUS ORGANISM, UNSPECIFIED LATERALITY, UNSPECIFIED PART OF LUNG: Primary | ICD-10-CM

## 2025-02-10 DIAGNOSIS — E66.01 OBESITY, MORBID (MULTI): ICD-10-CM

## 2025-02-10 DIAGNOSIS — F51.05 INSOMNIA DUE TO OTHER MENTAL DISORDER: ICD-10-CM

## 2025-02-10 DIAGNOSIS — I10 HYPERTENSION, ESSENTIAL: ICD-10-CM

## 2025-02-10 DIAGNOSIS — E78.00 HYPERCHOLESTEREMIA: ICD-10-CM

## 2025-02-10 DIAGNOSIS — J44.1 CHRONIC OBSTRUCTIVE PULMONARY DISEASE WITH (ACUTE) EXACERBATION (MULTI): ICD-10-CM

## 2025-02-10 DIAGNOSIS — I50.9 CONGESTIVE HEART FAILURE, UNSPECIFIED HF CHRONICITY, UNSPECIFIED HEART FAILURE TYPE: ICD-10-CM

## 2025-02-10 PROCEDURE — G0299 HHS/HOSPICE OF RN EA 15 MIN: HCPCS | Mod: HHH

## 2025-02-10 PROCEDURE — 1159F MED LIST DOCD IN RCRD: CPT | Performed by: INTERNAL MEDICINE

## 2025-02-10 PROCEDURE — 1157F ADVNC CARE PLAN IN RCRD: CPT | Performed by: INTERNAL MEDICINE

## 2025-02-10 PROCEDURE — 99496 TRANSJ CARE MGMT HIGH F2F 7D: CPT | Performed by: INTERNAL MEDICINE

## 2025-02-10 PROCEDURE — 3078F DIAST BP <80 MM HG: CPT | Performed by: INTERNAL MEDICINE

## 2025-02-10 PROCEDURE — 3074F SYST BP LT 130 MM HG: CPT | Performed by: INTERNAL MEDICINE

## 2025-02-10 PROCEDURE — 1036F TOBACCO NON-USER: CPT | Performed by: INTERNAL MEDICINE

## 2025-02-10 PROCEDURE — 1123F ACP DISCUSS/DSCN MKR DOCD: CPT | Performed by: INTERNAL MEDICINE

## 2025-02-10 PROCEDURE — 1160F RVW MEDS BY RX/DR IN RCRD: CPT | Performed by: INTERNAL MEDICINE

## 2025-02-10 PROCEDURE — 1111F DSCHRG MED/CURRENT MED MERGE: CPT | Performed by: INTERNAL MEDICINE

## 2025-02-10 RX ORDER — DONEPEZIL HYDROCHLORIDE 10 MG/1
10 TABLET, FILM COATED ORAL EVERY EVENING
Qty: 90 TABLET | Refills: 1 | Status: SHIPPED | OUTPATIENT
Start: 2025-02-10

## 2025-02-10 RX ORDER — FUROSEMIDE 40 MG/1
40 TABLET ORAL
Qty: 180 TABLET | Refills: 1 | Status: SHIPPED | OUTPATIENT
Start: 2025-02-10

## 2025-02-10 RX ORDER — MEMANTINE HYDROCHLORIDE 10 MG/1
10 TABLET ORAL 2 TIMES DAILY
Qty: 180 TABLET | Refills: 1 | Status: SHIPPED | OUTPATIENT
Start: 2025-02-10

## 2025-02-10 SDOH — HEALTH STABILITY: MENTAL HEALTH: SMOKING IN HOME: 0

## 2025-02-10 SDOH — ECONOMIC STABILITY: HOUSING INSECURITY: EVIDENCE OF SMOKING MATERIAL: 0

## 2025-02-10 ASSESSMENT — ENCOUNTER SYMPTOMS
BOWEL INCONTINENCE: 1
COUGH: 1
FEVER: 0
DIZZINESS: 0
WHEEZING: 0
PERSON REPORTING PAIN: PATIENT
HEADACHES: 0
DENIES PAIN: 1
DRY SKIN: 1
BRUISES/BLEEDS EASILY: 0
COUGH CHARACTERISTICS: PRODUCTIVE
CHANGE IN APPETITE: UNCHANGED
COUGH: 0
SPUTUM AMOUNT: COPIOUS
DIFFICULTY URINATING: 0
UNEXPECTED WEIGHT CHANGE: 0
DIARRHEA: 0
APPETITE LEVEL: FAIR
PALPITATIONS: 0
SPUTUM CONSISTENCY: THICK
SPUTUM COLOR: CLEAR
SINUS PAIN: 0
ARTHRALGIAS: 0
ABDOMINAL PAIN: 0
SPUTUM PRODUCTION: 1
SORE THROAT: 0
BLOOD IN STOOL: 0
MUSCLE WEAKNESS: 1
FATIGUE: 1

## 2025-02-10 ASSESSMENT — PAIN SCALES - PAIN ASSESSMENT IN ADVANCED DEMENTIA (PAINAD)
TOTALSCORE: 0
NEGVOCALIZATION: 0 - NONE.
BODYLANGUAGE: 0
BREATHING: 0
NEGVOCALIZATION: 0
FACIALEXPRESSION: 0 - SMILING OR INEXPRESSIVE.
BODYLANGUAGE: 0 - RELAXED.
FACIALEXPRESSION: 0
CONSOLABILITY: 0
CONSOLABILITY: 0 - NO NEED TO CONSOLE.

## 2025-02-10 ASSESSMENT — ACTIVITIES OF DAILY LIVING (ADL)
CURRENT_FUNCTION: STAND BY ASSIST
AMBULATION ASSISTANCE: STAND BY ASSIST

## 2025-02-10 NOTE — PROGRESS NOTES
Subjective   Patient ID: Blu Grigsby is a 84 y.o. male who presents for Hospital Follow-up (TCM, hospital follow up for influenza A, questions regarding if he should continue aerosol treatments) and Weakness, Gen (tired).    Transition of care  Patient admission history and physical, hospital course, medications, verified and reviewed  Patient contacted after discharge, medications verified comes today for follow-up    Inpatient facility: [facility]  Discharge diagnosis: Influenza A  Discharged to: home  Discharge date: 2/4/25  Initial Call date: 2/5/25  Spoke with patient/caregiver: caregiver                                                                      Do you need assistance  visits prior to your PCP visit: No  Home health care ordered: Yes  Have you been contacted by home care and have a start of care date: Yes  Are you taking medications as prescribed at discharge: YES     Referral to [facility] Pharmacist: No  Patient advised to bring all medications to PCP follow-up appointment.  Patient advised to follow discharge instructions until provider follow-up.  TCM visit date: 2/10/25  TCM provider visit with: NICK Dickey MD        Patient admitted for acute on chronic respiratory failure found to have the flu bilateral pneumonia treated with IV antibiotic discharged with home comes today with his son patient      - Status post pneumonia bilaterally finished antibiotic repeat x-ray in 4 weeks  -Hypotension low blood pressure and fatigue and tiredness patient needs to discontinue lisinopril 20 mg at this time reevaluate patient in 4 weeks  - Advanced dementia continue with Aricept and Namenda  - Confusion and hallucination continue with Remeron 15 mg at bedtime  -COPD respiratory failure continue DuoNeb treatment continues 4 L oxygen continuous  - Hypercholesteremia continues atorvastatin  -Paroxysmal atrial fibrillation patient on amiodarone apixaban and aspirin  -Anxiety and  "depression continue controlled continue with current medication  - CHF compensated follow-up CBC BMP in 4 weeks    Weakness, Gen  Associated symptoms include fatigue. Pertinent negatives include no abdominal pain, arthralgias, chest pain, congestion, coughing, fever, headaches, rash or sore throat.          Review of Systems   Constitutional:  Positive for fatigue. Negative for fever and unexpected weight change.   HENT:  Negative for congestion, ear discharge, ear pain, mouth sores, sinus pain and sore throat.    Eyes:  Negative for visual disturbance.   Respiratory:  Negative for cough and wheezing.    Cardiovascular:  Negative for chest pain, palpitations and leg swelling.   Gastrointestinal:  Negative for abdominal pain, blood in stool and diarrhea.   Genitourinary:  Negative for difficulty urinating.   Musculoskeletal:  Negative for arthralgias.   Skin:  Negative for rash.   Neurological:  Negative for dizziness and headaches.   Hematological:  Does not bruise/bleed easily.   Psychiatric/Behavioral:  Negative for behavioral problems.    All other systems reviewed and are negative.      Objective   No results found for: \"HGBA1C\"   BP 88/52   Pulse 66   Temp 36.4 °C (97.5 °F)   SpO2 96%   === 02/01/25 ===    CT CHEST ABDOMEN PELVIS WO CONTRAST    - Impression -  Bilateral lower lobe clusters of inflammatory parenchymal nodules,  suggestive of bacterial infection in the appropriate clinical setting.  Small right pleural effusion.  Cardiomegaly.  No findings of an acute process in the abdomen or pelvis.  Cholelithiasis.  Stent-graft repair of abdominal aortic aneurysm.  The aneurysm shows  maximum diameter of 5.2 cm.  Signed by Shaw Javier MD   Physical Exam  Vitals and nursing note reviewed.   Constitutional:       Appearance: Normal appearance. He is ill-appearing.   HENT:      Head: Normocephalic.      Nose: Nose normal.   Eyes:      Conjunctiva/sclera: Conjunctivae normal.      Pupils: Pupils are equal, " round, and reactive to light.   Cardiovascular:      Rate and Rhythm: Regular rhythm.   Pulmonary:      Effort: Pulmonary effort is normal.      Breath sounds: Normal breath sounds.   Abdominal:      General: Abdomen is flat.      Palpations: Abdomen is soft.   Musculoskeletal:      Cervical back: Neck supple.   Skin:     General: Skin is warm.   Neurological:      General: No focal deficit present.      Mental Status: He is oriented to person, place, and time.   Psychiatric:         Mood and Affect: Mood normal.         Assessment/Plan   Blu was seen today for hospital follow-up and weakness, gen.  Diagnoses and all orders for this visit:  Pneumonia due to infectious organism, unspecified laterality, unspecified part of lung (Primary)  -     Basic metabolic panel; Future  -     Basic metabolic panel  Essential (primary) hypertension  Atrial fibrillation with rapid ventricular response (Multi)  -     apixaban (Eliquis) 5 mg tablet; Take 1 tablet (5 mg) by mouth 2 times a day. Reports taking 1 tablet, QD  Chronic obstructive pulmonary disease with (acute) exacerbation (Multi)  Obesity, morbid (Multi)  Exudative age-related macular degeneration, left eye, with active choroidal neovascularization  Hypercholesteremia  Hypertension, essential  Congestive heart failure, unspecified HF chronicity, unspecified heart failure type  Insomnia due to other mental disorder  Other orders  -     Follow Up In Primary Care - Established; Future   Transition of care  Patient admission history and physical, hospital course, medications, verified and reviewed  Patient contacted after discharge, medications verified comes today for follow-up    Inpatient facility: [facility]  Discharge diagnosis: Influenza A  Discharged to: home  Discharge date: 2/4/25  Initial Call date: 2/5/25  Spoke with patient/caregiver: caregiver                                                                      Do you need assistance scheduling  specialist visits prior to your PCP visit: No  Home health care ordered: Yes  Have you been contacted by home care and have a start of care date: Yes  Are you taking medications as prescribed at discharge: YES     Referral to [facility] Pharmacist: No  Patient advised to bring all medications to PCP follow-up appointment.  Patient advised to follow discharge instructions until provider follow-up.  TCM visit date: 2/10/25  TCM provider visit with: NICK Dickey MD        Patient admitted for acute on chronic respiratory failure found to have the flu bilateral pneumonia treated with IV antibiotic discharged with home comes today with his son patient      - Status post pneumonia bilaterally finished antibiotic repeat x-ray in 4 weeks  -Hypotension low blood pressure and fatigue and tiredness patient needs to discontinue lisinopril 20 mg at this time reevaluate patient in 4 weeks  - Advanced dementia continue with Aricept and Namenda  - Confusion and hallucination continue with Remeron 15 mg at bedtime  -COPD respiratory failure continue DuoNeb treatment continues 4 L oxygen continuous  - Hypercholesteremia continues atorvastatin  -Paroxysmal atrial fibrillation patient on amiodarone apixaban and aspirin  -Anxiety and depression continue controlled continue with current medication  - CHF compensated follow-up CBC BMP in 4 weeks

## 2025-02-11 ENCOUNTER — APPOINTMENT (OUTPATIENT)
Dept: PRIMARY CARE | Facility: CLINIC | Age: 85
End: 2025-02-11
Payer: MEDICARE

## 2025-02-12 ENCOUNTER — HOME CARE VISIT (OUTPATIENT)
Dept: HOME HEALTH SERVICES | Facility: HOME HEALTH | Age: 85
End: 2025-02-12
Payer: MEDICARE

## 2025-02-12 VITALS — DIASTOLIC BLOOD PRESSURE: 50 MMHG | TEMPERATURE: 98.6 F | SYSTOLIC BLOOD PRESSURE: 100 MMHG

## 2025-02-12 VITALS
OXYGEN SATURATION: 100 % | HEART RATE: 59 BPM | TEMPERATURE: 99.1 F | SYSTOLIC BLOOD PRESSURE: 90 MMHG | DIASTOLIC BLOOD PRESSURE: 50 MMHG

## 2025-02-12 DIAGNOSIS — J44.1 CHRONIC OBSTRUCTIVE PULMONARY DISEASE WITH (ACUTE) EXACERBATION (MULTI): Primary | ICD-10-CM

## 2025-02-12 DIAGNOSIS — I50.22 HEART FAILURE WITH MID-RANGE EJECTION FRACTION (HFMEF): ICD-10-CM

## 2025-02-12 LAB
Q ONSET: 213 MS
QRS COUNT: 22 BEATS
QRS DURATION: 148 MS
QT INTERVAL: 372 MS
QTC CALCULATION(BAZETT): 549 MS
QTC FREDERICIA: 482 MS
R AXIS: -50 DEGREES
T AXIS: 138 DEGREES
T OFFSET: 399 MS
VENTRICULAR RATE: 131 BPM

## 2025-02-12 PROCEDURE — G0157 HHC PT ASSISTANT EA 15: HCPCS | Mod: CQ,HHH

## 2025-02-12 PROCEDURE — G0152 HHCP-SERV OF OT,EA 15 MIN: HCPCS | Mod: HHH

## 2025-02-12 SDOH — ECONOMIC STABILITY: HOUSING INSECURITY: EVIDENCE OF SMOKING MATERIAL: 0

## 2025-02-12 SDOH — HEALTH STABILITY: MENTAL HEALTH: SMOKING IN HOME: 0

## 2025-02-12 ASSESSMENT — ENCOUNTER SYMPTOMS
PERSON REPORTING PAIN: PATIENT
FATIGUE: 1
DOUBLE VISION: 0
NYSTAGMUS: 0
DENIES PAIN: 1
PERSON REPORTING PAIN: PATIENT
DENIES PAIN: 1
DESCRIPTION OF MEMORY LOSS: LONG TERM
FORGETFULNESS: 1
DESCRIPTION OF MEMORY LOSS: SHORT TERM

## 2025-02-12 ASSESSMENT — ACTIVITIES OF DAILY LIVING (ADL)
AMBULATION ASSISTANCE: MODERATE ASSIST
BATHING_CURRENT_FUNCTION: MAXIMUM ASSIST
TOILETING: ONE PERSON
GROOMING ASSESSED: 1
CURRENT_FUNCTION: ONE PERSON
DRESSING_LB_CURRENT_FUNCTION: MAXIMUM ASSIST
TOILETING: 1
DRESSING_UB_CURRENT_FUNCTION: MAXIMUM ASSIST
BATHING EQUIPMENT USED: SHOWER CHAIR, GRAB BAR
AMBULATION ASSISTANCE: 1
AMBULATION ASSISTANCE: MINIMUM ASSIST
BATHING ASSESSED: 1
GROOMING_CURRENT_FUNCTION: MAXIMUM ASSIST
TOILETING EQUIPMENT USED: TOILET SAFETY FRAME
CURRENT_FUNCTION: MODERATE ASSIST
PHYSICAL TRANSFERS ASSESSED: 1

## 2025-02-12 NOTE — Clinical Note
The daughter appears unrealistic, I saw pt in the morning    ----- Message -----  From: Kim Trevizo RN  Sent: 2/12/2025   6:08 PM EST  To: Lauryn Nina PTA; Macarena Hoang LPN; *  Subject: RE:                                                His daughter states that he gets more lethargic in the afternoons.  He was this way for my initial evaluation also.          ----- Message -----  From: Lauryn Nina PTA  Sent: 2/12/2025   4:28 PM EST  To: Macarena Hoang LPN; Faiza Espitia, PT; *      Patient presents lethargic, minimal participation with PTA. Vitals BP 90/50 and temp 99.1; other vitals WNL.

## 2025-02-12 NOTE — CASE COMMUNICATION
Patient presents lethargic, minimal participation with PTA. Vitals BP 90/50 and temp 99.1; other vitals WNL.

## 2025-02-12 NOTE — Clinical Note
The daughter in law is an RN.  She came during my evaluation.  I believe this man is at his full potential.        ----- Message -----  From: Saman Frederick OT  Sent: 2/13/2025   7:07 AM EST  To: Kim Trevizo RN  Subject: RE:                                                The daughter appears unrealistic, I saw pt in the morning    ----- Message -----  From: Kim Trevizo RN  Sent: 2/12/2025   6:08 PM EST  To: Lauryn Nina PTA; Macarena Hoang LPN; *  Subject: RE:                                                His daughter states that he gets more lethargic in the afternoons.  He was this way for my initial evaluation also.          ----- Message -----  From: Lauryn Nnia PTA  Sent: 2/12/2025   4:28 PM EST  To: Macarena Hoang LPN; Faiza Espitia, PT; *      Patient presents lethargic, minimal participation with PTA. Vitals BP 90/50 and temp 99.1; other vitals WNL.

## 2025-02-12 NOTE — Clinical Note
His daughter states that he gets more lethargic in the afternoons.  He was this way for my initial evaluation also.          ----- Message -----  From: Lauryn Nina, PTA  Sent: 2/12/2025   4:28 PM EST  To: Macarena Hoang LPN; Faiza Espitia, PT; *      Patient presents lethargic, minimal participation with PTA. Vitals BP 90/50 and temp 99.1; other vitals WNL.

## 2025-02-12 NOTE — PROGRESS NOTES
I reviewed the progress note and agree with the resident’s findings and plans as written. Case discussed with resident.    Rica Jensen, PharmD

## 2025-02-14 ENCOUNTER — HOME CARE VISIT (OUTPATIENT)
Dept: HOME HEALTH SERVICES | Facility: HOME HEALTH | Age: 85
End: 2025-02-14
Payer: MEDICARE

## 2025-02-14 VITALS
OXYGEN SATURATION: 95 % | SYSTOLIC BLOOD PRESSURE: 140 MMHG | RESPIRATION RATE: 18 BRPM | HEART RATE: 70 BPM | DIASTOLIC BLOOD PRESSURE: 78 MMHG | TEMPERATURE: 97.6 F

## 2025-02-14 PROCEDURE — G0158 HHC OT ASSISTANT EA 15: HCPCS | Mod: CO,HHH

## 2025-02-14 SDOH — ECONOMIC STABILITY: HOUSING INSECURITY: EVIDENCE OF SMOKING MATERIAL: 0

## 2025-02-14 SDOH — HEALTH STABILITY: MENTAL HEALTH: SMOKING IN HOME: 0

## 2025-02-14 ASSESSMENT — ENCOUNTER SYMPTOMS
PAIN: 1
PAIN LOCATION: RIGHT WRIST
HIGHEST PAIN SEVERITY IN PAST 24 HOURS: 4/10
PAIN SEVERITY GOAL: 0/10
LOWEST PAIN SEVERITY IN PAST 24 HOURS: 0/10
SUBJECTIVE PAIN PROGRESSION: UNCHANGED
PERSON REPORTING PAIN: PATIENT
PAIN LOCATION - PAIN SEVERITY: 4/10
PAIN LOCATION - PAIN FREQUENCY: FREQUENT
PAIN LOCATION - PAIN QUALITY: SORE

## 2025-02-17 ENCOUNTER — HOME CARE VISIT (OUTPATIENT)
Dept: HOME HEALTH SERVICES | Facility: HOME HEALTH | Age: 85
End: 2025-02-17
Payer: MEDICARE

## 2025-02-17 VITALS
SYSTOLIC BLOOD PRESSURE: 152 MMHG | TEMPERATURE: 97.6 F | DIASTOLIC BLOOD PRESSURE: 80 MMHG | HEART RATE: 79 BPM | OXYGEN SATURATION: 94 % | RESPIRATION RATE: 18 BRPM

## 2025-02-17 PROCEDURE — G0300 HHS/HOSPICE OF LPN EA 15 MIN: HCPCS | Mod: HHH

## 2025-02-18 ENCOUNTER — PATIENT OUTREACH (OUTPATIENT)
Dept: CARE COORDINATION | Facility: CLINIC | Age: 85
End: 2025-02-18
Payer: MEDICARE

## 2025-02-18 ENCOUNTER — HOME CARE VISIT (OUTPATIENT)
Dept: HOME HEALTH SERVICES | Facility: HOME HEALTH | Age: 85
End: 2025-02-18
Payer: MEDICARE

## 2025-02-18 ENCOUNTER — TELEPHONE (OUTPATIENT)
Dept: CARDIOLOGY | Facility: HOSPITAL | Age: 85
End: 2025-02-18
Payer: MEDICARE

## 2025-02-18 VITALS
TEMPERATURE: 97.5 F | HEART RATE: 57 BPM | DIASTOLIC BLOOD PRESSURE: 62 MMHG | OXYGEN SATURATION: 99 % | SYSTOLIC BLOOD PRESSURE: 100 MMHG

## 2025-02-18 VITALS
SYSTOLIC BLOOD PRESSURE: 100 MMHG | OXYGEN SATURATION: 99 % | DIASTOLIC BLOOD PRESSURE: 62 MMHG | RESPIRATION RATE: 18 BRPM | TEMPERATURE: 97.5 F | HEART RATE: 57 BPM

## 2025-02-18 LAB
ANION GAP SERPL CALCULATED.4IONS-SCNC: 10 MMOL/L (CALC) (ref 7–17)
BUN SERPL-MCNC: 21 MG/DL (ref 7–25)
BUN/CREAT SERPL: ABNORMAL (CALC) (ref 6–22)
CALCIUM SERPL-MCNC: 8.4 MG/DL (ref 8.6–10.3)
CHLORIDE SERPL-SCNC: 93 MMOL/L (ref 98–110)
CO2 SERPL-SCNC: 34 MMOL/L (ref 20–32)
CREAT SERPL-MCNC: 0.86 MG/DL (ref 0.7–1.22)
EGFRCR SERPLBLD CKD-EPI 2021: 85 ML/MIN/1.73M2
GLUCOSE SERPL-MCNC: 93 MG/DL (ref 65–139)
POTASSIUM SERPL-SCNC: 3.8 MMOL/L (ref 3.5–5.3)
SODIUM SERPL-SCNC: 137 MMOL/L (ref 135–146)

## 2025-02-18 PROCEDURE — G0157 HHC PT ASSISTANT EA 15: HCPCS | Mod: CQ,HHH

## 2025-02-18 PROCEDURE — G0158 HHC OT ASSISTANT EA 15: HCPCS | Mod: CO,HHH

## 2025-02-18 SDOH — ECONOMIC STABILITY: HOUSING INSECURITY: EVIDENCE OF SMOKING MATERIAL: 0

## 2025-02-18 SDOH — HEALTH STABILITY: MENTAL HEALTH: SMOKING IN HOME: 0

## 2025-02-18 NOTE — TELEPHONE ENCOUNTER
Patient daughter Romana called because Blu was in the E.D for pneumonia and Influenza A, he was discharged 2 weeks ago and had an episode of  A-FIB which they started him on eliquis and amiodarone, she said he has been very lethargic since being out of the hospital, he is on 2.5l of o2. He can barely walk to the bathroom, and she says its very unlike for him. Daughter wants to know if its possible to set up a virtual call. ([phone]), patient was last seen 12/26/24

## 2025-02-19 SDOH — ECONOMIC STABILITY: HOUSING INSECURITY: EVIDENCE OF SMOKING MATERIAL: 0

## 2025-02-19 SDOH — HEALTH STABILITY: MENTAL HEALTH: SMOKING IN HOME: 0

## 2025-02-19 ASSESSMENT — ENCOUNTER SYMPTOMS
DENIES PAIN: 1
DESCRIPTION OF MEMORY LOSS: SHORT TERM
LAST BOWEL MOVEMENT: 67253
APPETITE LEVEL: GOOD
PERSON REPORTING PAIN: PATIENT
CHANGE IN APPETITE: UNCHANGED
STOOL FREQUENCY: DAILY

## 2025-02-19 ASSESSMENT — PAIN SCALES - PAIN ASSESSMENT IN ADVANCED DEMENTIA (PAINAD)
BODYLANGUAGE: 0
CONSOLABILITY: 0 - NO NEED TO CONSOLE.
BREATHING: 0
TOTALSCORE: 0
CONSOLABILITY: 0
NEGVOCALIZATION: 0
BODYLANGUAGE: 0 - RELAXED.
NEGVOCALIZATION: 0 - NONE.
FACIALEXPRESSION: 0 - SMILING OR INEXPRESSIVE.
FACIALEXPRESSION: 0

## 2025-02-20 ENCOUNTER — HOME CARE VISIT (OUTPATIENT)
Dept: HOME HEALTH SERVICES | Facility: HOME HEALTH | Age: 85
End: 2025-02-20
Payer: MEDICARE

## 2025-02-20 VITALS
TEMPERATURE: 98.9 F | DIASTOLIC BLOOD PRESSURE: 80 MMHG | RESPIRATION RATE: 18 BRPM | HEART RATE: 71 BPM | SYSTOLIC BLOOD PRESSURE: 138 MMHG | OXYGEN SATURATION: 95 %

## 2025-02-20 PROCEDURE — G0158 HHC OT ASSISTANT EA 15: HCPCS | Mod: CO,HHH

## 2025-02-20 ASSESSMENT — ENCOUNTER SYMPTOMS
PERSON REPORTING PAIN: CAREGIVER
PAIN: 1
PAIN LOCATION - EXACERBATING FACTORS: POST FALL
PAIN LOCATION - PAIN FREQUENCY: WITH ACTIVITY
PAIN LOCATION - PAIN QUALITY: SORE
PAIN LOCATION: RIGHT WRIST

## 2025-02-21 SDOH — ECONOMIC STABILITY: HOUSING INSECURITY: EVIDENCE OF SMOKING MATERIAL: 0

## 2025-02-21 SDOH — HEALTH STABILITY: MENTAL HEALTH: SMOKING IN HOME: 0

## 2025-02-25 ENCOUNTER — HOME CARE VISIT (OUTPATIENT)
Dept: HOME HEALTH SERVICES | Facility: HOME HEALTH | Age: 85
End: 2025-02-25
Payer: MEDICARE

## 2025-02-25 VITALS
RESPIRATION RATE: 16 BRPM | HEART RATE: 74 BPM | TEMPERATURE: 97.3 F | WEIGHT: 176.38 LBS | SYSTOLIC BLOOD PRESSURE: 124 MMHG | DIASTOLIC BLOOD PRESSURE: 70 MMHG | BODY MASS INDEX: 34.45 KG/M2 | OXYGEN SATURATION: 95 %

## 2025-02-25 LAB
ATRIAL RATE: 81 BPM
ATRIAL RATE: 97 BPM
P AXIS: 45 DEGREES
P AXIS: 73 DEGREES
P OFFSET: 185 MS
P OFFSET: 189 MS
P ONSET: 128 MS
P ONSET: 130 MS
PR INTERVAL: 160 MS
PR INTERVAL: 162 MS
Q ONSET: 209 MS
Q ONSET: 210 MS
QRS COUNT: 13 BEATS
QRS COUNT: 16 BEATS
QRS DURATION: 148 MS
QRS DURATION: 148 MS
QT INTERVAL: 444 MS
QT INTERVAL: 470 MS
QTC CALCULATION(BAZETT): 545 MS
QTC CALCULATION(BAZETT): 563 MS
QTC FREDERICIA: 519 MS
QTC FREDERICIA: 520 MS
R AXIS: -50 DEGREES
R AXIS: -55 DEGREES
T AXIS: 113 DEGREES
T AXIS: 141 DEGREES
T OFFSET: 432 MS
T OFFSET: 444 MS
VENTRICULAR RATE: 81 BPM
VENTRICULAR RATE: 97 BPM

## 2025-02-25 PROCEDURE — G0151 HHCP-SERV OF PT,EA 15 MIN: HCPCS | Mod: HHH

## 2025-02-25 PROCEDURE — G0299 HHS/HOSPICE OF RN EA 15 MIN: HCPCS | Mod: HHH

## 2025-02-25 ASSESSMENT — ACTIVITIES OF DAILY LIVING (ADL)
AMBULATION ASSISTANCE: STAND BY ASSIST
CURRENT_FUNCTION: ONE PERSON
AMBULATION ASSISTANCE: ONE PERSON
CURRENT_FUNCTION: STAND BY ASSIST

## 2025-02-25 ASSESSMENT — ENCOUNTER SYMPTOMS
LAST BOWEL MOVEMENT: 67260
PERSON REPORTING PAIN: PATIENT
DENIES PAIN: 1
DRY SKIN: 1
DENIES PAIN: 1
STOOL FREQUENCY: DAILY
APPETITE LEVEL: FAIR
MUSCLE WEAKNESS: 1
PERSON REPORTING PAIN: PATIENT

## 2025-02-25 ASSESSMENT — PAIN SCALES - PAIN ASSESSMENT IN ADVANCED DEMENTIA (PAINAD)
CONSOLABILITY: 0 - NO NEED TO CONSOLE.
BREATHING: 0
CONSOLABILITY: 0
FACIALEXPRESSION: 0
BODYLANGUAGE: 0 - RELAXED.
FACIALEXPRESSION: 0 - SMILING OR INEXPRESSIVE.
NEGVOCALIZATION: 0 - NONE.
BODYLANGUAGE: 0
TOTALSCORE: 0
NEGVOCALIZATION: 0

## 2025-02-26 ENCOUNTER — HOME CARE VISIT (OUTPATIENT)
Dept: HOME HEALTH SERVICES | Facility: HOME HEALTH | Age: 85
End: 2025-02-26
Payer: MEDICARE

## 2025-02-26 VITALS
OXYGEN SATURATION: 94 % | HEART RATE: 69 BPM | DIASTOLIC BLOOD PRESSURE: 64 MMHG | RESPIRATION RATE: 18 BRPM | SYSTOLIC BLOOD PRESSURE: 105 MMHG | TEMPERATURE: 98 F

## 2025-02-26 DIAGNOSIS — I10 ESSENTIAL (PRIMARY) HYPERTENSION: Primary | ICD-10-CM

## 2025-02-26 PROCEDURE — G0158 HHC OT ASSISTANT EA 15: HCPCS | Mod: CO,HHH

## 2025-02-26 RX ORDER — CARVEDILOL 3.12 MG/1
3.12 TABLET ORAL 2 TIMES DAILY
Qty: 180 TABLET | Refills: 3 | Status: CANCELLED | OUTPATIENT
Start: 2025-02-26 | End: 2026-02-26

## 2025-02-26 RX ORDER — CARVEDILOL 3.12 MG/1
3.12 TABLET ORAL 2 TIMES DAILY
Qty: 180 TABLET | Refills: 3 | Status: SHIPPED | OUTPATIENT
Start: 2025-02-26 | End: 2026-02-26

## 2025-02-26 SDOH — ECONOMIC STABILITY: HOUSING INSECURITY: EVIDENCE OF SMOKING MATERIAL: 0

## 2025-02-26 SDOH — HEALTH STABILITY: MENTAL HEALTH: SMOKING IN HOME: 0

## 2025-02-26 ASSESSMENT — ENCOUNTER SYMPTOMS
DENIES PAIN: 1
PERSON REPORTING PAIN: PATIENT

## 2025-02-28 ENCOUNTER — HOME CARE VISIT (OUTPATIENT)
Dept: HOME HEALTH SERVICES | Facility: HOME HEALTH | Age: 85
End: 2025-02-28
Payer: MEDICARE

## 2025-03-03 ENCOUNTER — HOME CARE VISIT (OUTPATIENT)
Dept: HOME HEALTH SERVICES | Facility: HOME HEALTH | Age: 85
End: 2025-03-03
Payer: MEDICARE

## 2025-03-03 VITALS
TEMPERATURE: 98 F | SYSTOLIC BLOOD PRESSURE: 110 MMHG | OXYGEN SATURATION: 97 % | HEART RATE: 70 BPM | DIASTOLIC BLOOD PRESSURE: 70 MMHG | RESPIRATION RATE: 18 BRPM

## 2025-03-03 PROCEDURE — G0158 HHC OT ASSISTANT EA 15: HCPCS | Mod: CO,HHH

## 2025-03-03 SDOH — ECONOMIC STABILITY: HOUSING INSECURITY: EVIDENCE OF SMOKING MATERIAL: 0

## 2025-03-03 SDOH — HEALTH STABILITY: MENTAL HEALTH: SMOKING IN HOME: 0

## 2025-03-03 ASSESSMENT — ENCOUNTER SYMPTOMS
PERSON REPORTING PAIN: CAREGIVER
DENIES PAIN: 1

## 2025-03-05 ENCOUNTER — HOME CARE VISIT (OUTPATIENT)
Dept: HOME HEALTH SERVICES | Facility: HOME HEALTH | Age: 85
End: 2025-03-05
Payer: MEDICARE

## 2025-03-05 PROCEDURE — G0152 HHCP-SERV OF OT,EA 15 MIN: HCPCS | Mod: HHH

## 2025-03-05 ASSESSMENT — ENCOUNTER SYMPTOMS
HIGHEST PAIN SEVERITY IN PAST 24 HOURS: 6/10
PAIN SEVERITY GOAL: 0/10
LOWEST PAIN SEVERITY IN PAST 24 HOURS: 4/10
SUBJECTIVE PAIN PROGRESSION: UNCHANGED
PERSON REPORTING PAIN: PATIENT
PAIN LOCATION - EXACERBATING FACTORS: ACTIVITY
PAIN: 1
PAIN LOCATION - PAIN QUALITY: ACHING
PAIN LOCATION: ABDOMEN
PAIN LOCATION - RELIEVING FACTORS: REST
PAIN LOCATION - PAIN SEVERITY: 4/10

## 2025-03-05 ASSESSMENT — ACTIVITIES OF DAILY LIVING (ADL)
HOME_HEALTH_OASIS: 01
OASIS_M1830: 03

## 2025-03-10 ENCOUNTER — APPOINTMENT (OUTPATIENT)
Dept: PRIMARY CARE | Facility: CLINIC | Age: 85
End: 2025-03-10
Payer: MEDICARE

## 2025-03-10 VITALS
WEIGHT: 179.2 LBS | TEMPERATURE: 97.8 F | BODY MASS INDEX: 35.18 KG/M2 | HEART RATE: 82 BPM | OXYGEN SATURATION: 93 % | SYSTOLIC BLOOD PRESSURE: 134 MMHG | HEIGHT: 60 IN | DIASTOLIC BLOOD PRESSURE: 80 MMHG

## 2025-03-10 DIAGNOSIS — I50.9 CONGESTIVE HEART FAILURE, UNSPECIFIED HF CHRONICITY, UNSPECIFIED HEART FAILURE TYPE: ICD-10-CM

## 2025-03-10 DIAGNOSIS — E78.00 HYPERCHOLESTEREMIA: ICD-10-CM

## 2025-03-10 DIAGNOSIS — F03.C0 SEVERE DEMENTIA, UNSPECIFIED DEMENTIA TYPE, UNSPECIFIED WHETHER BEHAVIORAL, PSYCHOTIC, OR MOOD DISTURBANCE OR ANXIETY: ICD-10-CM

## 2025-03-10 DIAGNOSIS — I10 HYPERTENSION, UNSPECIFIED TYPE: ICD-10-CM

## 2025-03-10 DIAGNOSIS — J18.9 PNEUMONIA DUE TO INFECTIOUS ORGANISM, UNSPECIFIED LATERALITY, UNSPECIFIED PART OF LUNG: ICD-10-CM

## 2025-03-10 DIAGNOSIS — I10 ESSENTIAL (PRIMARY) HYPERTENSION: ICD-10-CM

## 2025-03-10 DIAGNOSIS — Z00.00 ROUTINE GENERAL MEDICAL EXAMINATION AT HEALTH CARE FACILITY: Primary | ICD-10-CM

## 2025-03-10 DIAGNOSIS — I10 HYPERTENSION, ESSENTIAL: ICD-10-CM

## 2025-03-10 PROCEDURE — 99214 OFFICE O/P EST MOD 30 MIN: CPT | Performed by: INTERNAL MEDICINE

## 2025-03-10 PROCEDURE — 1123F ACP DISCUSS/DSCN MKR DOCD: CPT | Performed by: INTERNAL MEDICINE

## 2025-03-10 PROCEDURE — 1158F ADVNC CARE PLAN TLK DOCD: CPT | Performed by: INTERNAL MEDICINE

## 2025-03-10 PROCEDURE — 3079F DIAST BP 80-89 MM HG: CPT | Performed by: INTERNAL MEDICINE

## 2025-03-10 PROCEDURE — 3075F SYST BP GE 130 - 139MM HG: CPT | Performed by: INTERNAL MEDICINE

## 2025-03-10 PROCEDURE — 1036F TOBACCO NON-USER: CPT | Performed by: INTERNAL MEDICINE

## 2025-03-10 PROCEDURE — 1170F FXNL STATUS ASSESSED: CPT | Performed by: INTERNAL MEDICINE

## 2025-03-10 PROCEDURE — 1159F MED LIST DOCD IN RCRD: CPT | Performed by: INTERNAL MEDICINE

## 2025-03-10 PROCEDURE — 1157F ADVNC CARE PLAN IN RCRD: CPT | Performed by: INTERNAL MEDICINE

## 2025-03-10 PROCEDURE — G0439 PPPS, SUBSEQ VISIT: HCPCS | Performed by: INTERNAL MEDICINE

## 2025-03-10 PROCEDURE — 1160F RVW MEDS BY RX/DR IN RCRD: CPT | Performed by: INTERNAL MEDICINE

## 2025-03-10 RX ORDER — LISINOPRIL 5 MG/1
5 TABLET ORAL DAILY
Qty: 100 TABLET | Refills: 3 | Status: SHIPPED | OUTPATIENT
Start: 2025-03-10 | End: 2026-04-14

## 2025-03-10 RX ORDER — MEMANTINE HYDROCHLORIDE 10 MG/1
10 TABLET ORAL 2 TIMES DAILY
COMMUNITY
Start: 2025-03-10

## 2025-03-10 ASSESSMENT — ENCOUNTER SYMPTOMS
BRUISES/BLEEDS EASILY: 0
HEADACHES: 0
PALPITATIONS: 0
DIARRHEA: 0
COUGH: 0
DIZZINESS: 0
DIFFICULTY URINATING: 0
FEVER: 0
UNEXPECTED WEIGHT CHANGE: 0
ARTHRALGIAS: 0
BLOOD IN STOOL: 0
SORE THROAT: 0
HYPERTENSION: 1
ABDOMINAL PAIN: 0
SINUS PAIN: 0
FATIGUE: 0
WHEEZING: 0

## 2025-03-10 ASSESSMENT — ACTIVITIES OF DAILY LIVING (ADL)
GROCERY_SHOPPING: TOTAL CARE
TAKING_MEDICATION: TOTAL CARE
BATHING: NEEDS ASSISTANCE
MANAGING_FINANCES: TOTAL CARE
DRESSING: NEEDS ASSISTANCE
DOING_HOUSEWORK: TOTAL CARE

## 2025-03-10 ASSESSMENT — PATIENT HEALTH QUESTIONNAIRE - PHQ9
10. IF YOU CHECKED OFF ANY PROBLEMS, HOW DIFFICULT HAVE THESE PROBLEMS MADE IT FOR YOU TO DO YOUR WORK, TAKE CARE OF THINGS AT HOME, OR GET ALONG WITH OTHER PEOPLE: NOT DIFFICULT AT ALL
1. LITTLE INTEREST OR PLEASURE IN DOING THINGS: SEVERAL DAYS
SUM OF ALL RESPONSES TO PHQ9 QUESTIONS 1 AND 2: 2
2. FEELING DOWN, DEPRESSED OR HOPELESS: SEVERAL DAYS

## 2025-03-10 NOTE — PROGRESS NOTES
"Subjective   Patient ID: Blu Grigsby is a 84 y.o. male who presents for Medicare Annual Wellness Visit Subsequent, Pneumonia (recheck), and Hypertension (Bp check).    HPI       Review of Systems    Objective   No results found for: \"HGBA1C\"   /80   Pulse 82   Temp 36.6 °C (97.8 °F)   Ht 1.524 m (5')   Wt 81.3 kg (179 lb 3.2 oz)   SpO2 93%   BMI 35.00 kg/m²     Physical Exam    Assessment/Plan   There are no diagnoses linked to this encounter.   "

## 2025-03-10 NOTE — PROGRESS NOTES
02/04/25 1:03 PM     Chart reviewed for Immunization(s) Inflluenza   was/were submitted to the patient's insurance.     Kathie Dumont MA   [facility]       Subjective   Reason for Visit: Blu Grigsby is an 84 y.o. male here for a Medicare Wellness visit.     Past Medical, Surgical, and Family History reviewed and updated in chart.    Reviewed all medications by prescribing practitioner or clinical pharmacist (such as prescriptions, OTCs, herbal therapies and supplements) and documented in the medical record.    Annual preventive visit and Medicare physical  - Vaccinations reviewed and up-to-date  - Screen for colon cancer no need to repeat due to patient age  - Advancing dementia continue with Aricept and Namenda  - Screen for depression negative  - Advance of directive reviewed most of his care under his family patient with very poor advanced dementia status  Tolerating oral meds and food without any complaints    Follow-up  -- Status post pneumonia bilaterally finished antibiotic continue monitor conservatively  - Hypotension resolved with need to resume lisinopril at a smaller dose 5 mg daily  -- Advanced dementia continue with Aricept and Namenda  - Confusion and hallucination continue with Remeron 15 mg at bedtime  -COPD respiratory failure continue DuoNeb treatment continues 4 L oxygen continuous  - Hypercholesteremia continues atorvastatin  -Paroxysmal atrial fibrillation patient on amiodarone apixaban and aspirin  -Anxiety and depression continue controlled continue with current medication  -CHF compensated continue with Lasix 40 mg daily follow-up electrolytes closely  Follow-up 4 weeks           Pneumonia  There is no cough or wheezing. Pertinent negatives include no chest pain, ear pain, fever, headaches or sore throat.   Hypertension  Pertinent negatives include no chest pain, headaches or palpitations.       Patient Care Team:  Toya Dickey MD as PCP - General  Toya Dickey [facility]  Chinyere Pham RN as Care Manager (Case Management)     Review of Systems   Constitutional:  Negative for fatigue, fever and  unexpected weight change.   HENT:  Negative for congestion, ear discharge, ear pain, mouth sores, sinus pain and sore throat.    Eyes:  Negative for visual disturbance.   Respiratory:  Negative for cough and wheezing.    Cardiovascular:  Negative for chest pain, palpitations and leg swelling.   Gastrointestinal:  Negative for abdominal pain, blood in stool and diarrhea.   Genitourinary:  Negative for difficulty urinating.   Musculoskeletal:  Negative for arthralgias.   Skin:  Negative for rash.   Neurological:  Negative for dizziness and headaches.   Hematological:  Does not bruise/bleed easily.   Psychiatric/Behavioral:  Negative for behavioral problems.    All other systems reviewed and are negative.      Objective   Vitals:  /80   Pulse 82   Temp 36.6 °C (97.8 °F)   Ht 1.524 m (5')   Wt 81.3 kg (179 lb 3.2 oz)   SpO2 93%   BMI 35.00 kg/m²     Lab Results   Component Value Date    WBC 11.0 02/04/2025    HGB 13.4 (L) 02/04/2025    HCT 44.6 02/04/2025     02/04/2025    CHOL 120 02/12/2024    TRIG 65 02/12/2024    HDL 42.8 02/12/2024    ALT 39 02/01/2025    AST 56 (H) 02/01/2025     02/17/2025    K 3.8 02/17/2025    CL 93 (L) 02/17/2025    CREATININE 0.86 02/17/2025    BUN 21 02/17/2025    CO2 34 (H) 02/17/2025    TSH 2.33 02/12/2024    INR 1.4 (H) 02/01/2025     par   Physical Exam  Vitals and nursing note reviewed.   Constitutional:       Appearance: Normal appearance.   HENT:      Head: Normocephalic.      Nose: Nose normal.   Eyes:      Conjunctiva/sclera: Conjunctivae normal.      Pupils: Pupils are equal, round, and reactive to light.   Cardiovascular:      Rate and Rhythm: Regular rhythm.   Pulmonary:      Effort: Pulmonary effort is normal.      Breath sounds: Normal breath sounds.   Abdominal:      General: Abdomen is flat.      Palpations: Abdomen is soft.   Musculoskeletal:      Cervical back: Neck supple.   Skin:     General: Skin is warm.   Neurological:      General: No focal  deficit present.      Mental Status: He is oriented to person, place, and time.   Psychiatric:         Mood and Affect: Mood normal.         Assessment & Plan  Routine general medical examination at health care facility    Orders:    1 Year Follow Up In Primary Care - Wellness Exam; Future    Severe dementia, unspecified dementia type, unspecified whether behavioral, psychotic, or mood disturbance or anxiety         Hypertension, unspecified type    Orders:    lisinopril 5 mg tablet; Take 1 tablet (5 mg) by mouth once daily.    Pneumonia due to infectious organism, unspecified laterality, unspecified part of lung         Essential (primary) hypertension         Hypercholesteremia         Congestive heart failure, unspecified HF chronicity, unspecified heart failure type         Hypertension, essential          Annual preventive visit and Medicare physical  - Vaccinations reviewed and up-to-date  - Screen for colon cancer no need to repeat due to patient age  - Advancing dementia continue with Aricept and Namenda  - Screen for depression negative  - Advance of directive reviewed most of his care under his family patient with very poor advanced dementia status  Tolerating oral meds and food without any complaints    Follow-up  -- Status post pneumonia bilaterally finished antibiotic continue monitor conservatively  - Hypotension resolved with need to resume lisinopril at a smaller dose 5 mg daily  -- Advanced dementia continue with Aricept and Namenda  - Confusion and hallucination continue with Remeron 15 mg at bedtime  -COPD respiratory failure continue DuoNeb treatment continues 4 L oxygen continuous  - Hypercholesteremia continues atorvastatin  -Paroxysmal atrial fibrillation patient on amiodarone apixaban and aspirin  -Anxiety and depression continue controlled continue with current medication  -CHF compensated continue with Lasix 40 mg daily follow-up electrolytes closely  Follow-up 4 weeks

## 2025-03-11 ENCOUNTER — APPOINTMENT (OUTPATIENT)
Dept: PHARMACY | Facility: HOSPITAL | Age: 85
End: 2025-03-11
Payer: MEDICARE

## 2025-03-11 DIAGNOSIS — I50.22 HEART FAILURE WITH MID-RANGE EJECTION FRACTION (HFMEF): ICD-10-CM

## 2025-03-11 DIAGNOSIS — I48.0 PAROXYSMAL ATRIAL FIBRILLATION (MULTI): ICD-10-CM

## 2025-03-11 DIAGNOSIS — J44.1 CHRONIC OBSTRUCTIVE PULMONARY DISEASE WITH (ACUTE) EXACERBATION (MULTI): ICD-10-CM

## 2025-03-11 DIAGNOSIS — I48.91 ATRIAL FIBRILLATION WITH RAPID VENTRICULAR RESPONSE (MULTI): ICD-10-CM

## 2025-03-11 NOTE — PROGRESS NOTES
Pharmacy Post-Discharge Visit  Blu Grigsby is a 84 y.o. male who was referred to the Clinical Pharmacy Team to complete a post-discharge medication optimization and monitoring visit.  The patient was referred for their Medication Cost Assistance and COPD.    Recent Hospitalization  Admission Date: 25  Discharge Date: 25  Discharge Diagnosis: Influenza A    Referring Provider: Dr. Toya Dickey AND Dr. Bryan Reyes    Medication Access  Cost barriers: Yes- Cost of Eliquis  Enrolled in  PAP: No  Manages own medication: No, daughter-Romana and sister in-law care  Transportation to the pharmacy: N/A  Frequency of missed doses: Rare    Allergies/Intolerances  No Known Allergies    Notable Medication changes following discharge:  START:   Amiodarone 200 mg daily  Cefuroxime 500 mg BID x7 days  Doxycycline 100 mg BID x8 doses  Eliquis 5 mg BID  Duonebs  Prednisone taper  CHANGE:   Lisinopril  HOLD:  N/A  STOP:   N/A    Today, I spoke with chanel Avendano for the duration of the visit. Although this Clinical Pharmacy visit is a post-discharge visit, it quickly became apparent that the cost of Eliquis was more important at this time.     COPD ASSESSMENT  Not assessed due to time.    Eliquis Assessment  Has been on Eliquis for ~1 month. Daughter notes they 'eased him into it' and had been giving him 1 tab daily for a few weeks.     Denies any substantial new bruising. No bleeding with teeth. No black or tarry stools.     Indication: treatment of nonvalvular atrial fibrilliation stroke and systemic embolism  Patient is projected to be on anticoagulation: Indefinitely  Dosing  Patient reported current dosin mg QD  Age: 84  Weight: 81.3 kg  Scr: 0.86 as of 25    [id number] Score: [7] (only included if diagnosis is atrial fibrillation)    Age: [<65 (0)] [65-74 (+1)] [> 75 (+2)]: 2   Sex: [Male/Female (+1)]: 0   CHF history: [No/Yes(+1)]: 1   Hypertension history: [No/Yes(+1)]: 1  "  Stroke/TIA/thromboembolism history: [No/Yes(+2)]: 2, history of two remote \"old\" strokes sometime between 5820-0958   Vascular disease history (prior MI, peripheral artery disease, aortic plaque): [No/Yes(+1)]: 1   Diabetes history: [No/Yes(+1)]: 0       Eligibility Requirements  [x] Patient has pre-existing prescription coverage  [x] Medication(s) above are listed on  PAP formulary  [x] Income: Less than or equal to 400% Federal Poverty Limit  Household Persons: 1  [x] Patient's prescription insurance is contracted with either [facility] or  Specialty Pharmacy           2025* Poverty Guidelines for the [facility] States   Persons in Family/Household Poverty Guideline Annual 400% Monthly 400%   1 $15,650 $62,600 $5,216   2 $21,150 $84,600 $7,050   3 $26,650 $106,600 $8,883   4 $32,150 $128,600 $10,716   5 $37,650 $150,600 $12,550   6 $43,150 $172,600 $14,383   7 $48,650 $194,600 $16,216   8 $54,150 $216,600 $18,050   *[facility] updates FPL guidelines in late January of each calendar year.       Initial Eligibility Determination  Note: Clinical Pharmacy is not responsible for finalizing eligibility approval. The  PAP team determines final eligibility.  This patient will likely qualify for the  PAP program.     Financial Documents Required for Application Submission  [] 1040 Tax Form   [] Statement of Non-filing  [] Social Security Annual Benefits Letter    Laboratory Results  Lab Results   Component Value Date    BILITOT 1.2 02/01/2025    CALCIUM 8.4 (L) 02/17/2025    CO2 34 (H) 02/17/2025    CL 93 (L) 02/17/2025    CREATININE 0.86 02/17/2025    GLUCOSE 93 02/17/2025    ALKPHOS 137 (H) 02/01/2025    K 3.8 02/17/2025    PROT 7.3 02/01/2025     02/17/2025    AST 56 (H) 02/01/2025    ALT 39 02/01/2025    BUN 21 02/17/2025    ANIONGAP 10 02/17/2025    MG 1.77 02/02/2025    ALBUMIN 4.2 02/01/2025    GFRMALE 74 01/27/2023    EGFR 85 02/17/2025     Lab Results   Component Value Date    TRIG 65 02/12/2024    " "CHOL 120 02/12/2024    HDL 42.8 02/12/2024     No results found for: \"HGBA1C\"      Assessment/Plan   Problem List Items Addressed This Visit       Heart failure with mid-range ejection fraction (HFmEF)    Relevant Orders    Referral to Clinical Pharmacy    Chronic obstructive pulmonary disease with (acute) exacerbation (Multi)    Relevant Orders    Referral to Clinical Pharmacy    Paroxysmal atrial fibrillation (Multi)     Other Visit Diagnoses       Atrial fibrillation with rapid ventricular response (Multi)        Relevant Medications    apixaban (Eliquis) 5 mg tablet          Assessment  Prescribing of Eliquis appears appropriate given the patient's diagnosis, age, weight, and renal function. No signs of bleeding at this time.     Plan/Changes   Continue all meds under the continuation of care with the referring provider and clinical pharmacy team  Start Eliquis 5 mg BID, send to [facility] for medication cost assistance and mail-order pharmacy.  Previously received written approval from cardiologist Dr. Bryan Reyes via [employer] In[employer]asket to prescribe Eliquis under his name specifically- thus, he has been added to this referral.     Next PCP Follow-Up  6/10/25      Cheryl Gandhi PharmD     Verbal consent to manage patient's drug therapy was obtained from the patient's daughter, Romana. They were informed they may decline to participate or withdraw from participation in pharmacy services at any time.  "

## 2025-03-11 NOTE — Clinical Note
Previously started application process for [facility] for [employer] cost assistance.

## 2025-03-13 ENCOUNTER — TELEMEDICINE (OUTPATIENT)
Dept: PHARMACY | Facility: HOSPITAL | Age: 85
End: 2025-03-13
Payer: MEDICARE

## 2025-03-13 DIAGNOSIS — I48.0 PAROXYSMAL ATRIAL FIBRILLATION (MULTI): Primary | ICD-10-CM

## 2025-03-13 DIAGNOSIS — J44.1 CHRONIC OBSTRUCTIVE PULMONARY DISEASE WITH (ACUTE) EXACERBATION (MULTI): ICD-10-CM

## 2025-03-13 DIAGNOSIS — I50.22 HEART FAILURE WITH MID-RANGE EJECTION FRACTION (HFMEF): ICD-10-CM

## 2025-03-13 DIAGNOSIS — I48.0 PAROXYSMAL ATRIAL FIBRILLATION (MULTI): ICD-10-CM

## 2025-03-13 NOTE — PROGRESS NOTES
"Pharmacy Post-Discharge Visit  Blu Grigsby is a 84 y.o. male who was referred to the Clinical Pharmacy Team to complete a post-discharge medication optimization and monitoring visit.  The patient was referred for their No chief complaint on file..    Recent Hospitalization  Admission Date: 25  Discharge Date: 25  Discharge Diagnosis: Influenza A    Referring Provider: Dr. Toya Dickey    Medication Access  Cost barriers: Yes- Cost of Eliquis  Enrolled in [facility]: No  Manages own medication: No, family (daugher, daughter-in-law)  Transportation to the pharmacy: N/A  Frequency of missed doses: Typically rare, however, has been taking Eliquis once daily (see below)    Last Visit/Interim    Interim, lisinopril increased.     Eliquis Assessment  Has been on Eliquis for ~1 month. Daughter notes they 'esased him into it' and had been giving him 1 tab daily for a few weeks.     Denies any substantial new bruising. No bleeding with teeth. No black or tarry stools.     Indication: treatment of nonvalvular atrial fibrilliation stroke and systemic embolism  Patient is projected to be on anticoagulation: Indefinitely  Dosing  Patient reported current dosin mg QD  Age: 84  Weight: 81.3 kg  Scr: 0.86 as of 25    [id number] Score: [7] (only included if diagnosis is atrial fibrillation)    Age: [<65 (0)] [65-74 (+1)] [> 75 (+2)]: 2   Sex: [Male/Female (+1)]: 0   CHF history: [No/Yes(+1)]: 1   Hypertension history: [No/Yes(+1)]: 1   Stroke/TIA/thromboembolism history: [No/Yes(+2)]: 2, history of two remote \"old\" strokes sometime between 7971-5009   Vascular disease history (prior MI, peripheral artery disease, aortic plaque): [No/Yes(+1)]: 1   Diabetes history: [No/Yes(+1)]: 0       Eligibility Requirements  [x] Patient has pre-existing prescription coverage  [x] Medication(s) above are listed on [facility] PAP formulary  [x] Income: Less than or equal to 400% Federal Poverty Limit  Household Persons: 1  [x] Patient's " "prescription insurance is contracted with either [facility] or  Specialty Pharmacy           2025* Poverty Guidelines for the [facility] States   Persons in Family/Household Poverty Guideline Annual 400% Monthly 400%   1 $15,650 $62,600 $5,216   2 $21,150 $84,600 $7,050   3 $26,650 $106,600 $8,883   4 $32,150 $128,600 $10,716   5 $37,650 $150,600 $12,550   6 $43,150 $172,600 $14,383   7 $48,650 $194,600 $16,216   8 $54,150 $216,600 $18,050   *[facility] updates FPL guidelines in late January of each calendar year.       Initial Eligibility Determination  Note: Clinical Pharmacy is not responsible for finalizing eligibility approval. The  PAP team determines final eligibility.  This patient *** qualify for the  PAP program.     Financial Documents Required for Application Submission  [] 1040 Tax Form   [] Statement of Non-filing  [] Social Security Annual Benefits Letter    Laboratory Results  Lab Results   Component Value Date    BILITOT 1.2 02/01/2025    CALCIUM 8.4 (L) 02/17/2025    CO2 34 (H) 02/17/2025    CL 93 (L) 02/17/2025    CREATININE 0.86 02/17/2025    GLUCOSE 93 02/17/2025    ALKPHOS 137 (H) 02/01/2025    K 3.8 02/17/2025    PROT 7.3 02/01/2025     02/17/2025    AST 56 (H) 02/01/2025    ALT 39 02/01/2025    BUN 21 02/17/2025    ANIONGAP 10 02/17/2025    MG 1.77 02/02/2025    ALBUMIN 4.2 02/01/2025    GFRMALE 74 01/27/2023    EGFR 85 02/17/2025     Lab Results   Component Value Date    TRIG 65 02/12/2024    CHOL 120 02/12/2024    HDL 42.8 02/12/2024     No results found for: \"HGBA1C\"      Assessment/Plan   Problem List Items Addressed This Visit    None    Assessment      Plan/Changes   Continue all meds under the continuation of care with the referring provider and clinical pharmacy team  Specialists: Patient currently sees outpatient ***.  Thursday 27th 4:20    Next PCP Follow-Up  ***    Next Clinical Pharmacy Follow-Up  ***  ***      Cheryl Gandhi, Steve     Verbal consent to manage patient's " drug therapy was obtained from ***. They were informed they may decline to participate or withdraw from participation in pharmacy services at any time.

## 2025-03-17 ENCOUNTER — PATIENT OUTREACH (OUTPATIENT)
Dept: CARE COORDINATION | Facility: CLINIC | Age: 85
End: 2025-03-17
Payer: MEDICARE

## 2025-03-27 ENCOUNTER — APPOINTMENT (OUTPATIENT)
Dept: PHARMACY | Facility: HOSPITAL | Age: 85
End: 2025-03-27
Payer: MEDICARE

## 2025-03-27 ENCOUNTER — OFFICE VISIT (OUTPATIENT)
Dept: CARDIOLOGY | Facility: HOSPITAL | Age: 85
End: 2025-03-27
Payer: MEDICARE

## 2025-03-27 VITALS
BODY MASS INDEX: 35.54 KG/M2 | WEIGHT: 182 LBS | HEART RATE: 76 BPM | DIASTOLIC BLOOD PRESSURE: 79 MMHG | OXYGEN SATURATION: 90 % | SYSTOLIC BLOOD PRESSURE: 123 MMHG

## 2025-03-27 DIAGNOSIS — I50.9: ICD-10-CM

## 2025-03-27 DIAGNOSIS — J44.1 CHRONIC OBSTRUCTIVE PULMONARY DISEASE WITH (ACUTE) EXACERBATION (MULTI): ICD-10-CM

## 2025-03-27 DIAGNOSIS — I48.0 PAROXYSMAL ATRIAL FIBRILLATION (MULTI): ICD-10-CM

## 2025-03-27 DIAGNOSIS — I48.0 PAROXYSMAL ATRIAL FIBRILLATION (MULTI): Primary | ICD-10-CM

## 2025-03-27 DIAGNOSIS — I34.0 MODERATE MITRAL REGURGITATION: ICD-10-CM

## 2025-03-27 DIAGNOSIS — I25.83 CORONARY ARTERY DISEASE DUE TO LIPID RICH PLAQUE: ICD-10-CM

## 2025-03-27 DIAGNOSIS — I50.22 HEART FAILURE WITH MID-RANGE EJECTION FRACTION (HFMEF): ICD-10-CM

## 2025-03-27 DIAGNOSIS — I10 HYPERTENSION, ESSENTIAL: ICD-10-CM

## 2025-03-27 DIAGNOSIS — Z86.79 HISTORY OF ISCHEMIC CARDIOMYOPATHY: ICD-10-CM

## 2025-03-27 DIAGNOSIS — I25.10 CORONARY ARTERY DISEASE DUE TO LIPID RICH PLAQUE: ICD-10-CM

## 2025-03-27 PROCEDURE — 3074F SYST BP LT 130 MM HG: CPT | Performed by: INTERNAL MEDICINE

## 2025-03-27 PROCEDURE — 1157F ADVNC CARE PLAN IN RCRD: CPT | Performed by: INTERNAL MEDICINE

## 2025-03-27 PROCEDURE — G2211 COMPLEX E/M VISIT ADD ON: HCPCS | Performed by: INTERNAL MEDICINE

## 2025-03-27 PROCEDURE — 1159F MED LIST DOCD IN RCRD: CPT | Performed by: INTERNAL MEDICINE

## 2025-03-27 PROCEDURE — 99215 OFFICE O/P EST HI 40 MIN: CPT | Performed by: INTERNAL MEDICINE

## 2025-03-27 PROCEDURE — 1123F ACP DISCUSS/DSCN MKR DOCD: CPT | Performed by: INTERNAL MEDICINE

## 2025-03-27 PROCEDURE — 3078F DIAST BP <80 MM HG: CPT | Performed by: INTERNAL MEDICINE

## 2025-03-27 RX ORDER — CARVEDILOL 3.12 MG/1
3.12 TABLET ORAL
Qty: 180 TABLET | Refills: 3 | Status: SHIPPED | OUTPATIENT
Start: 2025-03-27 | End: 2026-03-27

## 2025-03-27 NOTE — PROGRESS NOTES
Chief Complaint:   No chief complaint on file.     History Of Present Illness:    Blu Grigsby is a 84 y.o. male here for followup. He was hospitalized 6/17 with apparent flash pulmonary edema and type II MI. Has known CAD. Angiography revealed RCA  with R-R / L-R distal collaterals and ISR (90%) of a stent of the OM. He was medically managed. Echo 6/17 with EF 35-40% improved to 45-50% by TTE '2021 though noted moderate MR at that time. Known AAA s/p endovascular repair and follows with vascular.     Reports doing generally well presently. He is a poor historian given progressive dementia. He was hospitalized 2/2025 with influenza and atrial fibrillation with RVR. He was started on anticoagulation and amiodarone. His aspirin was stopped as was his Coreg.           Last Recorded Vitals:  Vitals:    03/27/25 1643   BP: 123/79   BP Location: Left arm   Patient Position: Sitting   BP Cuff Size: Adult   Pulse: 76   SpO2: 90%   Weight: 82.6 kg (182 lb)               Allergies:  Patient has no known allergies.    Outpatient Medications:  Current Outpatient Medications   Medication Instructions    amiodarone (PACERONE) 200 mg, oral, Daily    apixaban (ELIQUIS) 5 mg, oral, 2 times daily, Reports taking 1 tablet, QD    aspirin 81 mg chewable tablet 1 tablet, Daily    atorvastatin (LIPITOR) 20 mg, oral, Daily    donepezil (ARICEPT) 10 mg, oral, Every evening    furosemide (LASIX) 40 mg, oral, 2 times daily (morning and late afternoon)    ipratropium-albuteroL (Duo-Neb) 0.5-2.5 mg/3 mL nebulizer solution 3 mL, nebulization, 4 times daily PRN    lisinopril 5 mg, oral, Daily    memantine (NAMENDA) 10 mg, 2 times daily    mirtazapine (REMERON) 15 mg, oral, Nightly    multivitamin with folic acid (One Daily Multivitamin) 400 mcg tablet 1 tablet, Daily    oxygen (O2) gas therapy 1 each, inhalation, Continuous         Physical Exam:  Gen Well appearing elderly male sitting up in NAD. Body mass index is 35.54 kg/m².   CV rrr.  4/6 SILVINA. No JVD or leg edema.  Pulm Lungs clear with normal respiratory effort.  Neuro Alert and conversant. Grossly nonfocal.     I reviewed the patient's ECG from 2/2025 - AF with RVR. LBBB. Left axis deviation.      I reviewed most recent imaging / labs / and office notes as well as details of any recent hospitalizations or ED visits.      Assessment/Plan   Atrial fibrillation   Noted in the setting of influenza. May represent a secondary arrhythmia rather than primary / chronic paroxysmal atrial fibrillation. His family is interested in minimizing therapies if possible and safe. Plan on stopping amiodarone and Eliquis and reassessing for atrial fibrillation with a 2 week event monitor at that time. He will go back to aspirin and Coreg for now.     2. Chronic combined diastolic HF  Euvolemic. His present regimen is to continue.      3. Cardiomyopathy, ischemic  History of with EF 35-40%-->45-50%-->60% last check. His present regimen is to continue.      4. CAD  OM stent ISR. RCA  w/ left-right collaterals. Indefinite statin. Restarting aspirin as he goes off of Eliquis.     5. Mitral regurgitation  Moderate 2/22025. We previously elected not to follow this further given that no invasive procedures will be pursued if / when the leakage becomes severe.       6. Hypertension  BP well controlled. Adding back Coreg.           Follow-up 3 months         Bryan Reyes MD

## 2025-04-06 DIAGNOSIS — F03.C0 SEVERE DEMENTIA, UNSPECIFIED DEMENTIA TYPE, UNSPECIFIED WHETHER BEHAVIORAL, PSYCHOTIC, OR MOOD DISTURBANCE OR ANXIETY: ICD-10-CM

## 2025-04-07 RX ORDER — MIRTAZAPINE 15 MG/1
15 TABLET, FILM COATED ORAL NIGHTLY
Qty: 90 TABLET | Refills: 0 | Status: SHIPPED | OUTPATIENT
Start: 2025-04-07

## 2025-04-08 NOTE — PROGRESS NOTES
Outpatient Clinical Pharmacy Visit  Blu Grigsby is a 84 y.o. male who was referred to the ambulatory Clinical Pharmacy Team for their Medication Cost Assistance (Eliquis).    Referring Provider: Toya Dickey MD AND Dr. Bryan Reyes (Cardiology)    Medication Access  Cost barriers: Yes- cost of Eliquis  Enrolled in  PAP: Pending  Manages own medication: Daughters assist  Transportation to the pharmacy: N/A  Frequency of missed doses: Previously taking Eliquis every day, now taking BID    Discussion  Brief discussion with daughter Romana (currently in cardiology appointment at the time of this call). Still have not received determination from [facility] financial team.     As Romana expressed concern that they have started to run low on Eliquis, encouraged her to check with Dr. Reyes's office to determine if samples were available. Romana also relayed to me from nurse in cardiology office that  will be unable to supply samples of Eliquis moving forward, so they will be using up their supply/reserve for now.     Discussed with Romana that if samples were not available, she should call me back and we could discuss starting a charge account with [facility] while waiting for approval/denial to ensure no gaps in Eliquis pharmacotherapy.     Assessment/Plan   Problem List Items Addressed This Visit       Heart failure with mid-range ejection fraction (HFmEF)    Chronic obstructive pulmonary disease with (acute) exacerbation (Multi)    Paroxysmal atrial fibrillation (Multi)     Plan/Changes   Continue all meds under the continuation of care with the referring provider and clinical pharmacy team   PAP determination still pending at this time.     Next PCP Follow-Up  6/10/25    Next Clinical Pharmacy Follow-Up  TBD, pending determination      Cheryl Gandhi, PharmD     Verbal consent to manage patient's drug therapy was obtained from the patient. They were informed they may decline to participate or withdraw  from participation in pharmacy services at any time.

## 2025-04-18 ENCOUNTER — TELEPHONE (OUTPATIENT)
Dept: PRIMARY CARE | Facility: CLINIC | Age: 85
End: 2025-04-18
Payer: MEDICARE

## 2025-04-18 DIAGNOSIS — I25.83 CORONARY ARTERY DISEASE DUE TO LIPID RICH PLAQUE: ICD-10-CM

## 2025-04-18 DIAGNOSIS — I25.10 CORONARY ARTERY DISEASE DUE TO LIPID RICH PLAQUE: ICD-10-CM

## 2025-04-18 DIAGNOSIS — I50.9 CONGESTIVE HEART FAILURE, UNSPECIFIED HF CHRONICITY, UNSPECIFIED HEART FAILURE TYPE: ICD-10-CM

## 2025-04-18 DIAGNOSIS — I77.9: ICD-10-CM

## 2025-04-18 DIAGNOSIS — F03.C0 SEVERE DEMENTIA, UNSPECIFIED DEMENTIA TYPE, UNSPECIFIED WHETHER BEHAVIORAL, PSYCHOTIC, OR MOOD DISTURBANCE OR ANXIETY: ICD-10-CM

## 2025-04-18 NOTE — TELEPHONE ENCOUNTER
Patients son (Ronnie) would like a hospice referral for his father, Blu.   Patient is not doing so well, he's not able to get out of bed. Son says he thinks he might pass in a few days.       Please advise.

## 2025-04-24 ENCOUNTER — APPOINTMENT (OUTPATIENT)
Dept: CARDIOLOGY | Facility: HOSPITAL | Age: 85
End: 2025-04-24
Payer: MEDICARE

## 2025-06-10 ENCOUNTER — APPOINTMENT (OUTPATIENT)
Dept: PRIMARY CARE | Facility: CLINIC | Age: 85
End: 2025-06-10
Payer: MEDICARE

## 2025-07-03 ENCOUNTER — APPOINTMENT (OUTPATIENT)
Dept: CARDIOLOGY | Facility: HOSPITAL | Age: 85
End: 2025-07-03
Payer: MEDICARE